# Patient Record
Sex: MALE | Race: BLACK OR AFRICAN AMERICAN | Employment: OTHER | URBAN - METROPOLITAN AREA
[De-identification: names, ages, dates, MRNs, and addresses within clinical notes are randomized per-mention and may not be internally consistent; named-entity substitution may affect disease eponyms.]

---

## 2018-08-02 ENCOUNTER — HOSPITAL ENCOUNTER (INPATIENT)
Age: 83
LOS: 12 days | Discharge: HOSPICE | DRG: 853 | End: 2018-08-14
Attending: EMERGENCY MEDICINE | Admitting: FAMILY MEDICINE
Payer: COMMERCIAL

## 2018-08-02 ENCOUNTER — APPOINTMENT (OUTPATIENT)
Dept: CT IMAGING | Age: 83
DRG: 853 | End: 2018-08-02
Payer: COMMERCIAL

## 2018-08-02 ENCOUNTER — APPOINTMENT (OUTPATIENT)
Dept: GENERAL RADIOLOGY | Age: 83
DRG: 853 | End: 2018-08-02
Payer: COMMERCIAL

## 2018-08-02 DIAGNOSIS — R77.8 ELEVATED TROPONIN: ICD-10-CM

## 2018-08-02 DIAGNOSIS — N17.9 ACUTE RENAL FAILURE, UNSPECIFIED ACUTE RENAL FAILURE TYPE (HCC): ICD-10-CM

## 2018-08-02 DIAGNOSIS — R41.82 ALTERED MENTAL STATUS, UNSPECIFIED ALTERED MENTAL STATUS TYPE: ICD-10-CM

## 2018-08-02 DIAGNOSIS — N39.0 COMPLICATED UTI (URINARY TRACT INFECTION): ICD-10-CM

## 2018-08-02 DIAGNOSIS — A41.9 SEPSIS, DUE TO UNSPECIFIED ORGANISM: Primary | ICD-10-CM

## 2018-08-02 PROBLEM — R82.81 PYURIA: Status: ACTIVE | Noted: 2018-08-02

## 2018-08-02 PROBLEM — E87.20 LACTIC ACIDOSIS: Status: ACTIVE | Noted: 2018-08-02

## 2018-08-02 PROBLEM — R65.21 SEPTIC SHOCK (HCC): Status: ACTIVE | Noted: 2018-08-02

## 2018-08-02 PROBLEM — H40.10X0 OPEN-ANGLE GLAUCOMA: Status: ACTIVE | Noted: 2018-08-02

## 2018-08-02 LAB
A/G RATIO: 0.7 (ref 1.1–2.2)
ALBUMIN SERPL-MCNC: 3.1 G/DL (ref 3.4–5)
ALP BLD-CCNC: 192 U/L (ref 40–129)
ALT SERPL-CCNC: 30 U/L (ref 10–40)
AMORPHOUS: ABNORMAL /HPF
ANION GAP SERPL CALCULATED.3IONS-SCNC: 15 MMOL/L (ref 3–16)
AST SERPL-CCNC: 49 U/L (ref 15–37)
BACTERIA: ABNORMAL /HPF
BANDED NEUTROPHILS RELATIVE PERCENT: 22 % (ref 0–7)
BASOPHILS ABSOLUTE: 0 K/UL (ref 0–0.2)
BASOPHILS RELATIVE PERCENT: 0 %
BILIRUB SERPL-MCNC: 0.8 MG/DL (ref 0–1)
BILIRUBIN URINE: NEGATIVE
BLOOD, URINE: ABNORMAL
BUN BLDV-MCNC: 22 MG/DL (ref 7–20)
CALCIUM SERPL-MCNC: 8.6 MG/DL (ref 8.3–10.6)
CHLORIDE BLD-SCNC: 98 MMOL/L (ref 99–110)
CLARITY: CLEAR
CO2: 23 MMOL/L (ref 21–32)
COLOR: YELLOW
CREAT SERPL-MCNC: 2.4 MG/DL (ref 0.8–1.3)
EKG ATRIAL RATE: 94 BPM
EKG DIAGNOSIS: NORMAL
EKG Q-T INTERVAL: 344 MS
EKG QRS DURATION: 84 MS
EKG QTC CALCULATION (BAZETT): 425 MS
EKG R AXIS: -53 DEGREES
EKG T AXIS: 132 DEGREES
EKG VENTRICULAR RATE: 92 BPM
EOSINOPHILS ABSOLUTE: 0 K/UL (ref 0–0.6)
EOSINOPHILS RELATIVE PERCENT: 0 %
EPITHELIAL CELLS, UA: ABNORMAL /HPF
GFR AFRICAN AMERICAN: 31
GFR NON-AFRICAN AMERICAN: 26
GLOBULIN: 4.3 G/DL
GLUCOSE BLD-MCNC: 80 MG/DL (ref 70–99)
GLUCOSE URINE: NEGATIVE MG/DL
HCT VFR BLD CALC: 42.7 % (ref 40.5–52.5)
HEMOGLOBIN: 14.1 G/DL (ref 13.5–17.5)
KETONES, URINE: NEGATIVE MG/DL
LACTIC ACID, SEPSIS: 3.3 MMOL/L (ref 0.4–1.9)
LACTIC ACID, SEPSIS: 5 MMOL/L (ref 0.4–1.9)
LEUKOCYTE ESTERASE, URINE: ABNORMAL
LYMPHOCYTES ABSOLUTE: 0.3 K/UL (ref 1–5.1)
LYMPHOCYTES RELATIVE PERCENT: 10 %
MCH RBC QN AUTO: 30.9 PG (ref 26–34)
MCHC RBC AUTO-ENTMCNC: 33.1 G/DL (ref 31–36)
MCV RBC AUTO: 93.3 FL (ref 80–100)
MICROSCOPIC EXAMINATION: YES
MONOCYTES ABSOLUTE: 0.1 K/UL (ref 0–1.3)
MONOCYTES RELATIVE PERCENT: 4 %
NEUTROPHILS ABSOLUTE: 2.8 K/UL (ref 1.7–7.7)
NEUTROPHILS RELATIVE PERCENT: 64 %
NITRITE, URINE: POSITIVE
PDW BLD-RTO: 13.7 % (ref 12.4–15.4)
PH UA: 6
PLATELET # BLD: 224 K/UL (ref 135–450)
PLATELET SLIDE REVIEW: ADEQUATE
PMV BLD AUTO: 7.7 FL (ref 5–10.5)
POTASSIUM SERPL-SCNC: 3.3 MMOL/L (ref 3.5–5.1)
PROTEIN UA: 100 MG/DL
RBC # BLD: 4.57 M/UL (ref 4.2–5.9)
RBC UA: ABNORMAL /HPF (ref 0–2)
SLIDE REVIEW: ABNORMAL
SODIUM BLD-SCNC: 136 MMOL/L (ref 136–145)
SPECIFIC GRAVITY UA: 1.01
TOTAL PROTEIN: 7.4 G/DL (ref 6.4–8.2)
TROPONIN: 0.08 NG/ML
URINE TYPE: ABNORMAL
UROBILINOGEN, URINE: 4 E.U./DL
WBC # BLD: 3.3 K/UL (ref 4–11)
WBC UA: >100 /HPF (ref 0–5)

## 2018-08-02 PROCEDURE — 80053 COMPREHEN METABOLIC PANEL: CPT

## 2018-08-02 PROCEDURE — 2500000003 HC RX 250 WO HCPCS: Performed by: EMERGENCY MEDICINE

## 2018-08-02 PROCEDURE — 87086 URINE CULTURE/COLONY COUNT: CPT

## 2018-08-02 PROCEDURE — 2580000003 HC RX 258: Performed by: EMERGENCY MEDICINE

## 2018-08-02 PROCEDURE — 96375 TX/PRO/DX INJ NEW DRUG ADDON: CPT

## 2018-08-02 PROCEDURE — 96366 THER/PROPH/DIAG IV INF ADDON: CPT

## 2018-08-02 PROCEDURE — 71045 X-RAY EXAM CHEST 1 VIEW: CPT

## 2018-08-02 PROCEDURE — 83605 ASSAY OF LACTIC ACID: CPT

## 2018-08-02 PROCEDURE — 84484 ASSAY OF TROPONIN QUANT: CPT

## 2018-08-02 PROCEDURE — 87077 CULTURE AEROBIC IDENTIFY: CPT

## 2018-08-02 PROCEDURE — 2580000003 HC RX 258: Performed by: FAMILY MEDICINE

## 2018-08-02 PROCEDURE — 96365 THER/PROPH/DIAG IV INF INIT: CPT

## 2018-08-02 PROCEDURE — 81001 URINALYSIS AUTO W/SCOPE: CPT

## 2018-08-02 PROCEDURE — 93005 ELECTROCARDIOGRAM TRACING: CPT | Performed by: EMERGENCY MEDICINE

## 2018-08-02 PROCEDURE — 87186 SC STD MICRODIL/AGAR DIL: CPT

## 2018-08-02 PROCEDURE — 2580000003 HC RX 258: Performed by: INTERNAL MEDICINE

## 2018-08-02 PROCEDURE — 6370000000 HC RX 637 (ALT 250 FOR IP): Performed by: FAMILY MEDICINE

## 2018-08-02 PROCEDURE — 85025 COMPLETE CBC W/AUTO DIFF WBC: CPT

## 2018-08-02 PROCEDURE — 2500000003 HC RX 250 WO HCPCS: Performed by: INTERNAL MEDICINE

## 2018-08-02 PROCEDURE — 6370000000 HC RX 637 (ALT 250 FOR IP): Performed by: EMERGENCY MEDICINE

## 2018-08-02 PROCEDURE — 4500000025 HC ED LEVEL 5 PROCEDURE

## 2018-08-02 PROCEDURE — 87184 SC STD DISK METHOD PER PLATE: CPT

## 2018-08-02 PROCEDURE — 87801 DETECT AGNT MULT DNA AMPLI: CPT

## 2018-08-02 PROCEDURE — 93010 ELECTROCARDIOGRAM REPORT: CPT | Performed by: INTERNAL MEDICINE

## 2018-08-02 PROCEDURE — 6360000002 HC RX W HCPCS: Performed by: EMERGENCY MEDICINE

## 2018-08-02 PROCEDURE — 99285 EMERGENCY DEPT VISIT HI MDM: CPT

## 2018-08-02 PROCEDURE — 87040 BLOOD CULTURE FOR BACTERIA: CPT

## 2018-08-02 PROCEDURE — 2000000000 HC ICU R&B

## 2018-08-02 PROCEDURE — 74176 CT ABD & PELVIS W/O CONTRAST: CPT

## 2018-08-02 PROCEDURE — 96361 HYDRATE IV INFUSION ADD-ON: CPT

## 2018-08-02 PROCEDURE — 99291 CRITICAL CARE FIRST HOUR: CPT | Performed by: INTERNAL MEDICINE

## 2018-08-02 PROCEDURE — 70450 CT HEAD/BRAIN W/O DYE: CPT

## 2018-08-02 PROCEDURE — 96367 TX/PROPH/DG ADDL SEQ IV INF: CPT

## 2018-08-02 PROCEDURE — 87449 NOS EACH ORGANISM AG IA: CPT

## 2018-08-02 RX ORDER — SODIUM CHLORIDE 9 MG/ML
INJECTION, SOLUTION INTRAVENOUS CONTINUOUS
Status: DISCONTINUED | OUTPATIENT
Start: 2018-08-02 | End: 2018-08-03

## 2018-08-02 RX ORDER — AMLODIPINE BESYLATE 5 MG/1
5 TABLET ORAL DAILY
Status: CANCELLED | OUTPATIENT
Start: 2018-08-02

## 2018-08-02 RX ORDER — DOCUSATE SODIUM 100 MG/1
100 CAPSULE, LIQUID FILLED ORAL 2 TIMES DAILY
Status: DISCONTINUED | OUTPATIENT
Start: 2018-08-02 | End: 2018-08-07

## 2018-08-02 RX ORDER — TAMSULOSIN HYDROCHLORIDE 0.4 MG/1
0.4 CAPSULE ORAL NIGHTLY
Status: DISCONTINUED | OUTPATIENT
Start: 2018-08-02 | End: 2018-08-02

## 2018-08-02 RX ORDER — 0.9 % SODIUM CHLORIDE 0.9 %
1151 INTRAVENOUS SOLUTION INTRAVENOUS ONCE
Status: COMPLETED | OUTPATIENT
Start: 2018-08-02 | End: 2018-08-02

## 2018-08-02 RX ORDER — ASCORBIC ACID 500 MG
500 TABLET ORAL DAILY
Status: DISCONTINUED | OUTPATIENT
Start: 2018-08-02 | End: 2018-08-07

## 2018-08-02 RX ORDER — SODIUM CHLORIDE 9 MG/ML
1000 INJECTION, SOLUTION INTRAVENOUS CONTINUOUS
Status: DISCONTINUED | OUTPATIENT
Start: 2018-08-02 | End: 2018-08-02 | Stop reason: SDUPTHER

## 2018-08-02 RX ORDER — DORZOLAMIDE HYDROCHLORIDE AND TIMOLOL MALEATE 20; 5 MG/ML; MG/ML
1 SOLUTION/ DROPS OPHTHALMIC 2 TIMES DAILY
Status: DISCONTINUED | OUTPATIENT
Start: 2018-08-02 | End: 2018-08-14 | Stop reason: HOSPADM

## 2018-08-02 RX ORDER — SODIUM CHLORIDE 0.9 % (FLUSH) 0.9 %
10 SYRINGE (ML) INJECTION PRN
Status: DISCONTINUED | OUTPATIENT
Start: 2018-08-02 | End: 2018-08-14 | Stop reason: HOSPADM

## 2018-08-02 RX ORDER — ONDANSETRON 2 MG/ML
4 INJECTION INTRAMUSCULAR; INTRAVENOUS EVERY 30 MIN PRN
Status: DISCONTINUED | OUTPATIENT
Start: 2018-08-02 | End: 2018-08-02 | Stop reason: SDUPTHER

## 2018-08-02 RX ORDER — BRIMONIDINE TARTRATE 2 MG/ML
1 SOLUTION/ DROPS OPHTHALMIC 2 TIMES DAILY
Status: DISCONTINUED | OUTPATIENT
Start: 2018-08-02 | End: 2018-08-14 | Stop reason: HOSPADM

## 2018-08-02 RX ORDER — 0.9 % SODIUM CHLORIDE 0.9 %
1000 INTRAVENOUS SOLUTION INTRAVENOUS ONCE
Status: COMPLETED | OUTPATIENT
Start: 2018-08-02 | End: 2018-08-02

## 2018-08-02 RX ORDER — HYDRALAZINE HYDROCHLORIDE 25 MG/1
25 TABLET, FILM COATED ORAL 3 TIMES DAILY
Status: CANCELLED | OUTPATIENT
Start: 2018-08-02

## 2018-08-02 RX ORDER — SODIUM CHLORIDE 0.9 % (FLUSH) 0.9 %
10 SYRINGE (ML) INJECTION EVERY 12 HOURS SCHEDULED
Status: DISCONTINUED | OUTPATIENT
Start: 2018-08-02 | End: 2018-08-14 | Stop reason: HOSPADM

## 2018-08-02 RX ORDER — ONDANSETRON 2 MG/ML
4 INJECTION INTRAMUSCULAR; INTRAVENOUS EVERY 6 HOURS PRN
Status: DISCONTINUED | OUTPATIENT
Start: 2018-08-02 | End: 2018-08-14 | Stop reason: HOSPADM

## 2018-08-02 RX ORDER — SENNA PLUS 8.6 MG/1
1 TABLET ORAL 2 TIMES DAILY
Status: DISCONTINUED | OUTPATIENT
Start: 2018-08-02 | End: 2018-08-07

## 2018-08-02 RX ORDER — SODIUM CHLORIDE 0.9 % (FLUSH) 0.9 %
10 SYRINGE (ML) INJECTION PRN
Status: DISCONTINUED | OUTPATIENT
Start: 2018-08-02 | End: 2018-08-02 | Stop reason: SDUPTHER

## 2018-08-02 RX ORDER — SELENIUM 50 MCG
1 TABLET ORAL 2 TIMES DAILY
Status: DISCONTINUED | OUTPATIENT
Start: 2018-08-02 | End: 2018-08-02 | Stop reason: CLARIF

## 2018-08-02 RX ORDER — PANTOPRAZOLE SODIUM 40 MG/1
40 TABLET, DELAYED RELEASE ORAL
Status: DISCONTINUED | OUTPATIENT
Start: 2018-08-03 | End: 2018-08-06

## 2018-08-02 RX ORDER — ACETAMINOPHEN 650 MG/1
650 SUPPOSITORY RECTAL ONCE
Status: COMPLETED | OUTPATIENT
Start: 2018-08-02 | End: 2018-08-02

## 2018-08-02 RX ORDER — LACTOBACILLUS RHAMNOSUS GG 10B CELL
1 CAPSULE ORAL 2 TIMES DAILY WITH MEALS
Status: DISCONTINUED | OUTPATIENT
Start: 2018-08-03 | End: 2018-08-14 | Stop reason: HOSPADM

## 2018-08-02 RX ORDER — LATANOPROST 50 UG/ML
1 SOLUTION/ DROPS OPHTHALMIC NIGHTLY
Status: DISCONTINUED | OUTPATIENT
Start: 2018-08-02 | End: 2018-08-14 | Stop reason: HOSPADM

## 2018-08-02 RX ORDER — FERROUS SULFATE 325(65) MG
325 TABLET ORAL
Status: DISCONTINUED | OUTPATIENT
Start: 2018-08-03 | End: 2018-08-06

## 2018-08-02 RX ADMIN — SODIUM CHLORIDE 1000 ML: 9 INJECTION, SOLUTION INTRAVENOUS at 15:06

## 2018-08-02 RX ADMIN — SODIUM CHLORIDE: 9 INJECTION, SOLUTION INTRAVENOUS at 18:29

## 2018-08-02 RX ADMIN — BRIMONIDINE TARTRATE 1 DROP: 2 SOLUTION OPHTHALMIC at 22:55

## 2018-08-02 RX ADMIN — CEFTRIAXONE SODIUM 1 G: 1 INJECTION, POWDER, FOR SOLUTION INTRAMUSCULAR; INTRAVENOUS at 13:35

## 2018-08-02 RX ADMIN — SODIUM CHLORIDE: 9 INJECTION, SOLUTION INTRAVENOUS at 20:02

## 2018-08-02 RX ADMIN — VASOPRESSIN 0.04 UNITS/MIN: 20 INJECTION INTRAVENOUS at 22:45

## 2018-08-02 RX ADMIN — SODIUM CHLORIDE, PRESERVATIVE FREE 10 ML: 5 INJECTION INTRAVENOUS at 20:14

## 2018-08-02 RX ADMIN — APIXABAN 5 MG: 5 TABLET, FILM COATED ORAL at 20:14

## 2018-08-02 RX ADMIN — AZITHROMYCIN MONOHYDRATE 500 MG: 500 INJECTION, POWDER, LYOPHILIZED, FOR SOLUTION INTRAVENOUS at 14:18

## 2018-08-02 RX ADMIN — DORZOLAMIDE HYDROCHLORIDE AND TIMOLOL MALEATE 1 DROP: 20; 5 SOLUTION/ DROPS OPHTHALMIC at 22:55

## 2018-08-02 RX ADMIN — DOCUSATE SODIUM 100 MG: 100 CAPSULE, LIQUID FILLED ORAL at 20:17

## 2018-08-02 RX ADMIN — NOREPINEPHRINE BITARTRATE 2 MCG/MIN: 1 INJECTION INTRAVENOUS at 17:41

## 2018-08-02 RX ADMIN — TAMSULOSIN HYDROCHLORIDE 0.4 MG: 0.4 CAPSULE ORAL at 20:14

## 2018-08-02 RX ADMIN — ACETAMINOPHEN 650 MG: 650 SUPPOSITORY RECTAL at 11:50

## 2018-08-02 RX ADMIN — SODIUM CHLORIDE 1151 ML: 9 INJECTION, SOLUTION INTRAVENOUS at 13:34

## 2018-08-02 RX ADMIN — LATANOPROST 1 DROP: 50 SOLUTION OPHTHALMIC at 23:51

## 2018-08-02 RX ADMIN — ONDANSETRON HYDROCHLORIDE 4 MG: 2 INJECTION, SOLUTION INTRAMUSCULAR; INTRAVENOUS at 11:49

## 2018-08-02 RX ADMIN — SODIUM CHLORIDE 1000 ML: 9 INJECTION, SOLUTION INTRAVENOUS at 11:49

## 2018-08-02 ASSESSMENT — PAIN SCALES - GENERAL: PAINLEVEL_OUTOF10: 0

## 2018-08-02 NOTE — H&P
Hospital Medicine History & Physical      PCP: Tatyana Mcintyre    Date of Admission: 8/2/2018    Date of Service: Pt seen/examined on 8/2/18 and Admitted to Inpatient with expected LOS greater than two midnights due to medical therapy. Chief Complaint:  Confused , lethargic       History Of Present Illness:      80 y.o. male with PMH of DVt, chr diastolic chf , chronic supra pubic catheter for urethral stricture presents from home with c/o confusion ,  Lethargy , nausea with non bloody emesis since this am , pt had his suprapubic catheter changed yesterday   Pt is confused, could not contribute much to history     Past Medical History:          Diagnosis Date    Urinary retention     UTI (urinary tract infection)        Past Surgical History:      History reviewed. No pertinent surgical history. Medications Prior to Admission:      Prior to Admission medications    Medication Sig Start Date End Date Taking?  Authorizing Provider   apixaban (ELIQUIS) 5 MG TABS tablet Take 2 tablets by mouth 2 times daily 6/18/15   Pratima Castillo MD   apixaban [facility]) 5 MG TABS tablet Take 1 tablet by mouth 2 times daily 6/24/15   Pratima Castillo MD   ferrous sulfate 325 (65 FE) MG EC tablet Take 1 tablet by mouth daily (with breakfast) 6/18/15   Pratima Castillo MD   latanoprost (XALATAN) 0.005 % ophthalmic solution   Place 1 drop into both eyes nightly     Historical Provider, MD   brimonidine (ALPHAGAN) 0.2 % ophthalmic solution Place 1 drop into both eyes 2 times daily    Historical Provider, MD   omeprazole (PRILOSEC) 20 MG capsule Take 20 mg by mouth daily    Historical Provider, MD   furosemide (LASIX) 20 MG tablet Take 20 mg by mouth daily    Historical Provider, MD   tamsulosin (FLOMAX) 0.4 MG capsule Take 0.4 mg by mouth nightly    Historical Provider, MD   potassium chloride SA (K-DUR;KLOR-CON M) 20 MEQ tablet Take 20 mEq by mouth 2 times daily    Historical Provider, MD   docusate sodium (COLACE) 100 MG 23.33 kg/m²     General appearance:  Lethargic   HEENT:  Normal cephalic, atraumatic without obvious deformity. Pupils equal, round, and reactive to light. Extra ocular muscles intact. Conjunctivae/corneas clear. Neck: Supple, with full range of motion. No jugular venous distention. Trachea midline. Respiratory:  Normal respiratory effort. Clear to auscultation, bilaterally without Rales/Wheezes/Rhonchi. Cardiovascular:  Regular rate and rhythm with normal S1/S2 without murmurs, rubs or gallops. Abdomen: Soft, non-tender, non-distended with normal bowel sounds. Musculoskeletal:  No clubbing, cyanosis or edema bilaterally. Full range of motion without deformity. Skin: Skin color, texture, turgor normal.  No rashes or lesions. Neurologic:   grossly non-focal.  Psychiatric:  Somnolent , opens eyes to voice   Capillary Refill: Brisk,< 3 seconds   Peripheral Pulses: +2 palpable, equal bilaterally       Labs:     Recent Labs      08/02/18   1137   WBC  3.3*   HGB  14.1   HCT  42.7   PLT  224     Recent Labs      08/02/18   1137   NA  136   K  3.3*   CL  98*   CO2  23   BUN  22*   CREATININE  2.4*   CALCIUM  8.6     Recent Labs      08/02/18   1137   AST  49*   ALT  30   BILITOT  0.8   ALKPHOS  192*     No results for input(s): INR in the last 72 hours. Recent Labs      08/02/18   1137   TROPONINI  0.08*       Urinalysis:      Lab Results   Component Value Date    NITRU POSITIVE 08/02/2018    WBCUA >100 08/02/2018    BACTERIA 1+ 08/02/2018    RBCUA  08/02/2018    BLOODU MODERATE 08/02/2018    SPECGRAV 1.010 08/02/2018    GLUCOSEU Negative 08/02/2018       Radiology:     CXR: I have reviewed the CXR with the following interpretation: ? infiltrate  EKG:  I have reviewed the EKG with the following interpretation:no acute changes     CT HEAD WO CONTRAST   Final Result   No acute intracranial abnormality.          XR CHEST PORTABLE   Preliminary Result   Curvilinear opacity within the medial right lung base, which could represent   atelectasis, pneumonia, or aspiration. ASSESSMENT:    There are no active hospital problems to display for this patient. PLAN:    Septic shock   likely due to UTI, complicated, related to chronic suprapubic catheter   Poss Pneumonia, poss gram pos, aspiration risk   ARF     Pt presents with fever, encephalopathy, hypotension , wbc 3.3, elevated lactic acid of 5 , pt given appropriate fluid bolus , still hypotensive  ,central line being placed in ED,   admit to ICU  Cont IVF, empiric abx rocephin , azithro , hold antihtn meds   Further w/u CT abd given recent suprapubic catheter exchange. SLP to eval for dysphagia     ARF  Creat up to 2.4 from a baseline of 1   Cont IVF, f/u with rpt BMP     H/o DVT   Cont eliquis     Chr diastolic CHF   Hold lasix     DVT Prophylaxis: eliquis   Diet:    Code Status:DNR CCA     PT/OT Eval Status:     Dispo - 3-4 days        Chace Salinas MD    Thank you [facility] for the opportunity to be involved in this patient's care. If you have any questions or concerns please feel free to contact me at [phone].

## 2018-08-02 NOTE — ED PROVIDER NOTES
Emergency Department Provider Note  Location: [address] [place] [address]  ED  8/2/2018     Patient Identification  Delta Brown is a 80 y.o. male    Chief Complaint  Altered Mental Status (Pt incontinent of stool. Pt has suprapubic catheter.  )      Mode of Arrival  EMS    HPI  (History provided by Wife)  This is a 80 y.o. male with a PMH significant for ureteral stricture s/p suprapubic catheter placement presented today for AMS. Wife stated that patient was fine yesterday getting a suprapubic catheter changed out. Today, patient started complaining of nausea and shortly after became altered. He vomited a couple times and then became more altered to the point that he was no longer responsive. Urine has appeared cloudy to wife but no antibiotics recently. Patient is altered and unable to provide any further history. ROS  Review of Systems   Unable to perform ROS: Mental status change        I have reviewed the following nursing documentation:  Allergies: No Known Allergies    Past medical history:  has no past medical history on file. Past surgical history:  has no past surgical history on file. Home medications:   Prior to Admission medications    Medication Sig Start Date End Date Taking?  Authorizing Provider   apixaban (ELIQUIS) 5 MG TABS tablet Take 2 tablets by mouth 2 times daily 6/18/15   Jocelyne Ham MD   apixaban [facility]) 5 MG TABS tablet Take 1 tablet by mouth 2 times daily 6/24/15   Jocelyne Ham MD   ferrous sulfate 325 (65 FE) MG EC tablet Take 1 tablet by mouth daily (with breakfast) 6/18/15   Jocelyne Ham MD   latanoprost (XALATAN) 0.005 % ophthalmic solution   Place 1 drop into both eyes nightly     Historical Provider, MD   brimonidine (ALPHAGAN) 0.2 % ophthalmic solution Place 1 drop into both eyes 2 times daily    Historical Provider, MD   omeprazole (PRILOSEC) 20 MG capsule Take 20 mg by mouth daily    Historical Provider, MD   furosemide (LASIX) 20 MG tablet Take 20 mg by mouth daily    Historical Provider, MD   tamsulosin (FLOMAX) 0.4 MG capsule Take 0.4 mg by mouth nightly    Historical Provider, MD   potassium chloride SA (K-DUR;KLOR-CON M) 20 MEQ tablet Take 20 mEq by mouth 2 times daily    Historical Provider, MD   docusate sodium (COLACE) 100 MG capsule Take 100 mg by mouth 2 times daily    Historical Provider, MD   senna (SENOKOT) 8.6 MG tablet Take 1 tablet by mouth 2 times daily    Historical Provider, MD   hydrALAZINE (APRESOLINE) 25 MG tablet Take 25 mg by mouth 3 times daily    Historical Provider, MD   dorzolamide-timolol (COSOPT) 22.3-6.8 MG/ML ophthalmic solution 1 drop 2 times daily    Historical Provider, MD   amLODIPine (NORVASC) 5 MG tablet Take 5 mg by mouth daily Hold if SBP <110    Historical Provider, MD   bisacodyl (BISAC-EVAC) 10 MG suppository Place 10 mg rectally daily as needed for Constipation    Historical Provider, MD   Lactobacillus (ACIDOPHILUS) CAPS capsule Take 1 capsule by mouth 2 times daily    Historical Provider, MD   Multiple Vitamins-Minerals (STRESS FORMULA 500/ZINC) TABS Take 1 tablet by mouth daily    Historical Provider, MD   menthol-zinc oxide (RISAMINE) 0.44-20.625 % OINT ointment Apply topically daily Max 30 ml per day. Historical Provider, MD   silver sulfADIAZINE (SILVADENE) 1 % cream Apply topically daily    Historical Provider, MD   ascorbic acid (VITAMIN C) 500 MG tablet Take 500 mg by mouth daily    Historical Provider, MD   acetaminophen (TYLENOL) 500 MG tablet Take 500 mg by mouth every 6 hours as needed for Pain    Historical Provider, MD       Social history:  reports that he has never smoked. He has never used smokeless tobacco. He reports that he does not drink alcohol or use drugs. Family history:  History reviewed. No pertinent family history.     Exam  ED Triage Vitals [08/02/18 1126]   BP Temp Temp Source Pulse Resp SpO2 Height Weight   (!) 83/45 103.7 °F (39.8 °C) Rectal 90 23 (!) 89 % 5' 9\" (1.753 m) 158 lb (71.7 kg)   Physical Exam   Constitutional: He appears well-developed and well-nourished. He appears lethargic. He appears ill. HENT:   Head: Normocephalic and atraumatic. Eyes: Conjunctivae are normal. Right pupil is reactive. Left pupil is reactive. Neck: Neck rigidity present. Decreased range of motion present. No tracheal deviation present. Cardiovascular: Normal rate and regular rhythm. Pulmonary/Chest: No stridor. Tachypnea noted. No respiratory distress. He has decreased breath sounds. Abdominal: Soft. He exhibits no distension and no mass. There is no guarding. Musculoskeletal: He exhibits no edema or deformity. Lymphadenopathy:     He has no cervical adenopathy. Neurological: He appears lethargic. He is disoriented. He displays no tremor. He exhibits abnormal muscle tone (Increased tone throughout). He displays no seizure activity. Skin: Skin is warm and dry. No rash noted. He is not diaphoretic. Nursing note and vitals reviewed. MDM/ED Course  Delay in obtaining urine; patient had no urine in his catheter/bag for 3 hours    EKG  The Ekg interpreted by me in the absence of a cardiologist shows. Very wavy baseline, likely junctional rhythm with a rate of 92  Axis is   Left axis deviation  QTc is  within an acceptable range  No specific ST-T wave changes appreciated. No evidence of acute ischemia. Compare to prior EKG dated 6/9/15, junctional rhythm today and left axis deviation new      Radiology  Ct Head Wo Contrast    Result Date: 8/2/2018  EXAMINATION: CT OF THE HEAD WITHOUT CONTRAST  8/2/2018 12:51 pm TECHNIQUE: CT of the head was performed without the administration of intravenous contrast. Dose modulation, iterative reconstruction, and/or weight based adjustment of the mA/kV was utilized to reduce the radiation dose to as low as reasonably achievable.  COMPARISON: CT 06/09/2015 HISTORY: ORDERING SYSTEM PROVIDED HISTORY: [facility] TECHNOLOGIST PROVIDED HISTORY: Has a \"code stroke\" or alternatives were discussed. A written consent was obtained. The skin over the right internal jugular vein was cleansed with ChloraPrep and draped in the usual sterile fashion. After infiltration of 5mL 1% lidocaine with epinephrine for anesthesia, an 18-gauge needle was inserted under ultrasound guidance while aspirating with a 10mL syringe. After a flash of dark venous blood returned, the right internal jugular vein was accessed with a sterile guidewire through the needle. The needle was then removed over the guidwire. A small skin incision was made with a #11 scalpel over the guidewire. An 8.5 Pakistani subcutaneous dilator was then inserted and withdrawn. Using Seldinger technique, the normal saline pre-flushed 7-Pakistani triple lumen catheter was inserted over the guidewire and each port accessed for free aspiration and flushed with saline to confirm placement within the venous lumen. The catheter was then secured to the skin using sutures to prevent dislodgement. The area was then recleansed with ChloraPrep and dressed with an antibiotic-infused disc and a sterile occlusive dressing. Patient tolerated procedure well without immediate complications. Post-procedure x-ray ordered and interpreted by myself showed no PTX; TLC appears to be in good position. I spoke with Dr. Nishant Hill. hospitalist. We thoroughly discussed the history, physical exam, laboratory and imaging studies, as well as, emergency department course. Based upon that discussion, we've decided to admit Bree Smith for further observation and evaluation of Marita Leung's mental status change. As I have deemed necessary from their history, physical and studies, I have considered and evaluated Bree Smith for the following diagnoses:  DIABETES, INTRACRANIAL HEMORRHAGE, MENINGITIS, SEPSIS SUBARACHNOID HEMORRHAGE, SUBDURAL HEMATOMA, & STROKE. FINAL IMPRESSION  1. Sepsis, due to unspecified organism ([facility] [address]    2.  Altered mental status, unspecified altered mental status type    3. Acute renal failure, unspecified acute renal failure type (HCC)    4. Elevated troponin    5. Complicated UTI (urinary tract infection)          ED Medication Orders     Start Ordered     Status Ordering Provider    08/02/18 1600 08/02/18 1552  norepinephrine (LEVOPHED) 16 mg in dextrose 5 % 250 mL infusion  CONTINUOUS      Last MAR action:  New Bag - by Lisy Black on 08/02/18 at [facility] [address], [facility]    08/02/18 1515 08/02/18 1503  0.9 % sodium chloride infusion  CONTINUOUS      Last MAR action:  New Bag - by Olayinka Aragon on 08/02/18 at [place] [address], [facility]    08/02/18 1300 08/02/18 1258  cefTRIAXone (ROCEPHIN) 1 g IVPB in 50 mL D5W minibag  ONCE      Last MAR action:  Stopped - by Olayinka Aragon on 08/02/18 at [address] [facility], [facility]    08/02/18 1300 08/02/18 1258  azithromycin (ZITHROMAX) 500 mg in D5W 250ml addavial  ONCE      Last MAR action:  Stopped - by Olayinka Aragon on 08/02/18 at [address] [facility], [facility]    08/02/18 1230 08/02/18 1230  0.9 % sodium chloride bolus  ONCE      Last MAR action:  Stopped - by Olayinka Aragon on 08/02/18 at [place] [address], [facility]    08/02/18 1130 08/02/18 1117  acetaminophen (TYLENOL) suppository 650 mg  ONCE      Last MAR action:  Given - by Olayinka Aragon on 08/02/18 at [address] [facility]    08/02/18 1115 08/02/18 1112  0.9 % sodium chloride bolus  ONCE      Last MAR action:  Stopped - by Olayinka Aragon on 08/02/18 at [facility] [address], [facility]    08/02/18 1114 08/02/18 1114  sodium chloride flush 0.9 % injection 10 mL  PRN      Acknowledged Ney Conley, [facility]    08/02/18 1112 08/02/18 1112  ondansetron (ZOFRAN) injection 4 mg  EVERY 30 MIN PRN      Last MAR action:  Given - by Olayinka Aragon on 08/02/18 at [facility]          Disposition:  Admit to CCU/ICU in critical condition. The total Critical Care time is 50 minutes which excludes separately billable procedures.       This chart was generated in part by using [employer] and may contain errors related to that system including errors in grammar, punctuation, and spelling, as well as words and phrases that may be inappropriate. If there are any questions or concerns please feel free to contact the dictating provider for clarification.      Alejandro Avila MD  [address] [place] [address] Stewart Caal MD  08/02/18 9470

## 2018-08-02 NOTE — ED NOTES
Bedside report given to [facility] from A-2. [facility] confirmed tele box [phone].       Wesly Huerta RN  08/02/18 3275

## 2018-08-02 NOTE — ED NOTES
Portable chest xray in progress to check central line placement.        Sandra Yan RN  08/02/18 2439

## 2018-08-02 NOTE — ED NOTES
Pt incontinent of stool. Perineal care provided. Pt cristian well. Suprapubic catheter bag changed.        Matteo Palmer RN  08/02/18 6743

## 2018-08-02 NOTE — ED NOTES
Bedside report given to [facility] RN from ICU.        Ananth Carrillo RN  08/02/18 0479

## 2018-08-03 ENCOUNTER — ANESTHESIA EVENT (OUTPATIENT)
Dept: OPERATING ROOM | Age: 83
DRG: 853 | End: 2018-08-03
Payer: COMMERCIAL

## 2018-08-03 ENCOUNTER — APPOINTMENT (OUTPATIENT)
Dept: GENERAL RADIOLOGY | Age: 83
DRG: 853 | End: 2018-08-03
Payer: COMMERCIAL

## 2018-08-03 ENCOUNTER — ANESTHESIA (OUTPATIENT)
Dept: OPERATING ROOM | Age: 83
DRG: 853 | End: 2018-08-03
Payer: COMMERCIAL

## 2018-08-03 VITALS
TEMPERATURE: 97.5 F | OXYGEN SATURATION: 100 % | SYSTOLIC BLOOD PRESSURE: 119 MMHG | DIASTOLIC BLOOD PRESSURE: 69 MMHG | RESPIRATION RATE: 15 BRPM

## 2018-08-03 PROBLEM — K56.41 FECAL IMPACTION (HCC): Status: ACTIVE | Noted: 2018-08-03

## 2018-08-03 PROBLEM — R91.8 PULMONARY INFILTRATES: Status: ACTIVE | Noted: 2018-08-03

## 2018-08-03 PROBLEM — K86.2 PANCREATIC CYST: Status: ACTIVE | Noted: 2018-08-03

## 2018-08-03 PROBLEM — R78.81 BACTEREMIA DUE TO PSEUDOMONAS: Status: ACTIVE | Noted: 2018-08-03

## 2018-08-03 PROBLEM — J95.89 ACUTE RESPIRATORY INSUFFICIENCY, POSTOPERATIVE: Status: ACTIVE | Noted: 2018-08-03

## 2018-08-03 PROBLEM — B96.5 BACTEREMIA DUE TO PSEUDOMONAS: Status: ACTIVE | Noted: 2018-08-03

## 2018-08-03 PROBLEM — N20.0 NEPHROLITHIASIS: Status: ACTIVE | Noted: 2018-08-03

## 2018-08-03 PROBLEM — N13.2 HYDRONEPHROSIS WITH URINARY OBSTRUCTION DUE TO RENAL CALCULUS: Status: ACTIVE | Noted: 2018-08-03

## 2018-08-03 LAB
ALBUMIN SERPL-MCNC: 2.1 G/DL (ref 3.4–5)
ALBUMIN SERPL-MCNC: 2.1 G/DL (ref 3.4–5)
ANION GAP SERPL CALCULATED.3IONS-SCNC: 13 MMOL/L (ref 3–16)
ANION GAP SERPL CALCULATED.3IONS-SCNC: 13 MMOL/L (ref 3–16)
ANION GAP SERPL CALCULATED.3IONS-SCNC: 15 MMOL/L (ref 3–16)
BANDED NEUTROPHILS RELATIVE PERCENT: 56 % (ref 0–7)
BASE EXCESS ARTERIAL: -7 MMOL/L (ref -3–3)
BASOPHILS ABSOLUTE: 0 K/UL (ref 0–0.2)
BASOPHILS RELATIVE PERCENT: 0 %
BUN BLDV-MCNC: 24 MG/DL (ref 7–20)
BUN BLDV-MCNC: 25 MG/DL (ref 7–20)
BUN BLDV-MCNC: 28 MG/DL (ref 7–20)
CALCIUM SERPL-MCNC: 6.9 MG/DL (ref 8.3–10.6)
CALCIUM SERPL-MCNC: 7.2 MG/DL (ref 8.3–10.6)
CALCIUM SERPL-MCNC: 7.4 MG/DL (ref 8.3–10.6)
CARBOXYHEMOGLOBIN ARTERIAL: 0.3 % (ref 0–1.5)
CHLORIDE BLD-SCNC: 100 MMOL/L (ref 99–110)
CHLORIDE BLD-SCNC: 101 MMOL/L (ref 99–110)
CHLORIDE BLD-SCNC: 105 MMOL/L (ref 99–110)
CO2: 17 MMOL/L (ref 21–32)
CO2: 20 MMOL/L (ref 21–32)
CO2: 20 MMOL/L (ref 21–32)
CREAT SERPL-MCNC: 2.3 MG/DL (ref 0.8–1.3)
CREAT SERPL-MCNC: 2.7 MG/DL (ref 0.8–1.3)
CREAT SERPL-MCNC: 2.8 MG/DL (ref 0.8–1.3)
EOSINOPHILS ABSOLUTE: 0 K/UL (ref 0–0.6)
EOSINOPHILS RELATIVE PERCENT: 0 %
GFR AFRICAN AMERICAN: 26
GFR AFRICAN AMERICAN: 27
GFR AFRICAN AMERICAN: 32
GFR NON-AFRICAN AMERICAN: 21
GFR NON-AFRICAN AMERICAN: 22
GFR NON-AFRICAN AMERICAN: 27
GLUCOSE BLD-MCNC: 102 MG/DL (ref 70–99)
GLUCOSE BLD-MCNC: 117 MG/DL (ref 70–99)
GLUCOSE BLD-MCNC: 177 MG/DL (ref 70–99)
HCO3 ARTERIAL: 17.1 MMOL/L (ref 21–29)
HCT VFR BLD CALC: 30.9 % (ref 40.5–52.5)
HCT VFR BLD CALC: 32.9 % (ref 40.5–52.5)
HEMOGLOBIN, ART, EXTENDED: 10.1 G/DL (ref 13.5–17.5)
HEMOGLOBIN: 10.5 G/DL (ref 13.5–17.5)
HEMOGLOBIN: 10.7 G/DL (ref 13.5–17.5)
L. PNEUMOPHILA SEROGP 1 UR AG: NORMAL
LACTIC ACID: 4.7 MMOL/L (ref 0.4–2)
LACTIC ACID: 4.8 MMOL/L (ref 0.4–2)
LACTIC ACID: 5.1 MMOL/L (ref 0.4–2)
LYMPHOCYTES ABSOLUTE: 0.2 K/UL (ref 1–5.1)
LYMPHOCYTES RELATIVE PERCENT: 1 %
MAGNESIUM: 1.7 MG/DL (ref 1.8–2.4)
MCH RBC QN AUTO: 30.3 PG (ref 26–34)
MCH RBC QN AUTO: 31 PG (ref 26–34)
MCHC RBC AUTO-ENTMCNC: 32.4 G/DL (ref 31–36)
MCHC RBC AUTO-ENTMCNC: 33.9 G/DL (ref 31–36)
MCV RBC AUTO: 91.7 FL (ref 80–100)
MCV RBC AUTO: 93.4 FL (ref 80–100)
METAMYELOCYTES RELATIVE PERCENT: 2 %
METHEMOGLOBIN ARTERIAL: 0.6 %
MONOCYTES ABSOLUTE: 0.5 K/UL (ref 0–1.3)
MONOCYTES RELATIVE PERCENT: 2 %
NEUTROPHILS ABSOLUTE: 23.5 K/UL (ref 1.7–7.7)
NEUTROPHILS RELATIVE PERCENT: 39 %
O2 CONTENT ARTERIAL: 14 ML/DL
O2 SAT, ARTERIAL: 98.5 %
O2 THERAPY: ABNORMAL
PCO2 ARTERIAL: 29.5 MMHG (ref 35–45)
PDW BLD-RTO: 13.8 % (ref 12.4–15.4)
PDW BLD-RTO: 13.8 % (ref 12.4–15.4)
PH ARTERIAL: 7.38 (ref 7.35–7.45)
PHOSPHORUS: 3.7 MG/DL (ref 2.5–4.9)
PHOSPHORUS: 3.7 MG/DL (ref 2.5–4.9)
PLATELET # BLD: 130 K/UL (ref 135–450)
PLATELET # BLD: 155 K/UL (ref 135–450)
PMV BLD AUTO: 8.6 FL (ref 5–10.5)
PMV BLD AUTO: 9.1 FL (ref 5–10.5)
PO2 ARTERIAL: 147.5 MMHG (ref 75–108)
POTASSIUM REFLEX MAGNESIUM: 3.5 MMOL/L (ref 3.5–5.1)
POTASSIUM SERPL-SCNC: 3.4 MMOL/L (ref 3.5–5.1)
POTASSIUM SERPL-SCNC: 3.5 MMOL/L (ref 3.5–5.1)
RBC # BLD: 3.37 M/UL (ref 4.2–5.9)
RBC # BLD: 3.52 M/UL (ref 4.2–5.9)
REPORT: NORMAL
SODIUM BLD-SCNC: 133 MMOL/L (ref 136–145)
SODIUM BLD-SCNC: 135 MMOL/L (ref 136–145)
SODIUM BLD-SCNC: 136 MMOL/L (ref 136–145)
STREP PNEUMONIAE ANTIGEN, URINE: NORMAL
TCO2 ARTERIAL: 18 MMOL/L
TOXIC GRANULATION: PRESENT
VACUOLATED NEUTROPHILS: PRESENT
WBC # BLD: 24.2 K/UL (ref 4–11)
WBC # BLD: 31.8 K/UL (ref 4–11)

## 2018-08-03 PROCEDURE — 6370000000 HC RX 637 (ALT 250 FOR IP): Performed by: FAMILY MEDICINE

## 2018-08-03 PROCEDURE — 2580000003 HC RX 258: Performed by: NURSE ANESTHETIST, CERTIFIED REGISTERED

## 2018-08-03 PROCEDURE — 3600000005 HC SURGERY LEVEL 5 BASE: Performed by: UROLOGY

## 2018-08-03 PROCEDURE — 3700000001 HC ADD 15 MINUTES (ANESTHESIA): Performed by: UROLOGY

## 2018-08-03 PROCEDURE — 3209999900 FLUORO FOR SURGICAL PROCEDURES

## 2018-08-03 PROCEDURE — 85025 COMPLETE CBC W/AUTO DIFF WBC: CPT

## 2018-08-03 PROCEDURE — 94002 VENT MGMT INPAT INIT DAY: CPT

## 2018-08-03 PROCEDURE — 2580000003 HC RX 258: Performed by: INTERNAL MEDICINE

## 2018-08-03 PROCEDURE — 85027 COMPLETE CBC AUTOMATED: CPT

## 2018-08-03 PROCEDURE — 3600000015 HC SURGERY LEVEL 5 ADDTL 15MIN: Performed by: UROLOGY

## 2018-08-03 PROCEDURE — 6360000002 HC RX W HCPCS: Performed by: INTERNAL MEDICINE

## 2018-08-03 PROCEDURE — 80069 RENAL FUNCTION PANEL: CPT

## 2018-08-03 PROCEDURE — G8997 SWALLOW GOAL STATUS: HCPCS

## 2018-08-03 PROCEDURE — 2580000003 HC RX 258: Performed by: FAMILY MEDICINE

## 2018-08-03 PROCEDURE — 99223 1ST HOSP IP/OBS HIGH 75: CPT | Performed by: INTERNAL MEDICINE

## 2018-08-03 PROCEDURE — 2500000003 HC RX 250 WO HCPCS: Performed by: INTERNAL MEDICINE

## 2018-08-03 PROCEDURE — 83605 ASSAY OF LACTIC ACID: CPT

## 2018-08-03 PROCEDURE — 6360000002 HC RX W HCPCS

## 2018-08-03 PROCEDURE — 36592 COLLECT BLOOD FROM PICC: CPT

## 2018-08-03 PROCEDURE — C2617 STENT, NON-COR, TEM W/O DEL: HCPCS | Performed by: UROLOGY

## 2018-08-03 PROCEDURE — 2000000000 HC ICU R&B

## 2018-08-03 PROCEDURE — 3700000000 HC ANESTHESIA ATTENDED CARE: Performed by: UROLOGY

## 2018-08-03 PROCEDURE — 94770 HC ETCO2 MONITOR DAILY: CPT

## 2018-08-03 PROCEDURE — 94761 N-INVAS EAR/PLS OXIMETRY MLT: CPT

## 2018-08-03 PROCEDURE — 74018 RADEX ABDOMEN 1 VIEW: CPT

## 2018-08-03 PROCEDURE — 92610 EVALUATE SWALLOWING FUNCTION: CPT

## 2018-08-03 PROCEDURE — 2500000003 HC RX 250 WO HCPCS: Performed by: NURSE ANESTHETIST, CERTIFIED REGISTERED

## 2018-08-03 PROCEDURE — 99291 CRITICAL CARE FIRST HOUR: CPT | Performed by: INTERNAL MEDICINE

## 2018-08-03 PROCEDURE — 2580000003 HC RX 258: Performed by: UROLOGY

## 2018-08-03 PROCEDURE — 94750 HC PULMONARY COMPLIANCE STUDY: CPT

## 2018-08-03 PROCEDURE — 99292 CRITICAL CARE ADDL 30 MIN: CPT | Performed by: INTERNAL MEDICINE

## 2018-08-03 PROCEDURE — 6360000002 HC RX W HCPCS: Performed by: ANESTHESIOLOGY

## 2018-08-03 PROCEDURE — 2580000003 HC RX 258: Performed by: EMERGENCY MEDICINE

## 2018-08-03 PROCEDURE — 2500000003 HC RX 250 WO HCPCS: Performed by: EMERGENCY MEDICINE

## 2018-08-03 PROCEDURE — C1769 GUIDE WIRE: HCPCS | Performed by: UROLOGY

## 2018-08-03 PROCEDURE — 82803 BLOOD GASES ANY COMBINATION: CPT

## 2018-08-03 PROCEDURE — G8996 SWALLOW CURRENT STATUS: HCPCS

## 2018-08-03 PROCEDURE — 2700000000 HC OXYGEN THERAPY PER DAY

## 2018-08-03 PROCEDURE — 2709999900 HC NON-CHARGEABLE SUPPLY: Performed by: UROLOGY

## 2018-08-03 PROCEDURE — 6360000002 HC RX W HCPCS: Performed by: NURSE ANESTHETIST, CERTIFIED REGISTERED

## 2018-08-03 PROCEDURE — 83735 ASSAY OF MAGNESIUM: CPT

## 2018-08-03 PROCEDURE — 36591 DRAW BLOOD OFF VENOUS DEVICE: CPT

## 2018-08-03 DEVICE — URETERAL STENT
Type: IMPLANTABLE DEVICE | Status: FUNCTIONAL
Brand: CONTOUR™

## 2018-08-03 RX ORDER — POTASSIUM CHLORIDE 29.8 MG/ML
20 INJECTION INTRAVENOUS ONCE
Status: COMPLETED | OUTPATIENT
Start: 2018-08-03 | End: 2018-08-03

## 2018-08-03 RX ORDER — SODIUM CHLORIDE 9 MG/ML
INJECTION, SOLUTION INTRAVENOUS
Status: DISPENSED
Start: 2018-08-03 | End: 2018-08-04

## 2018-08-03 RX ORDER — SODIUM BICARBONATE 42 MG/ML
INJECTION, SOLUTION INTRAVENOUS PRN
Status: DISCONTINUED | OUTPATIENT
Start: 2018-08-03 | End: 2018-08-03 | Stop reason: SDUPTHER

## 2018-08-03 RX ORDER — CIPROFLOXACIN 2 MG/ML
400 INJECTION, SOLUTION INTRAVENOUS EVERY 24 HOURS
Status: DISCONTINUED | OUTPATIENT
Start: 2018-08-03 | End: 2018-08-03

## 2018-08-03 RX ORDER — ATROPINE SULFATE 0.1 MG/ML
INJECTION INTRAVENOUS PRN
Status: DISCONTINUED | OUTPATIENT
Start: 2018-08-03 | End: 2018-08-03 | Stop reason: SDUPTHER

## 2018-08-03 RX ORDER — SODIUM CHLORIDE 9 MG/ML
INJECTION, SOLUTION INTRAVENOUS CONTINUOUS PRN
Status: DISCONTINUED | OUTPATIENT
Start: 2018-08-03 | End: 2018-08-03 | Stop reason: SDUPTHER

## 2018-08-03 RX ORDER — 0.9 % SODIUM CHLORIDE 0.9 %
1000 INTRAVENOUS SOLUTION INTRAVENOUS ONCE
Status: COMPLETED | OUTPATIENT
Start: 2018-08-03 | End: 2018-08-03

## 2018-08-03 RX ORDER — ATROPINE SULFATE 0.1 MG/ML
INJECTION INTRAVENOUS
Status: DISPENSED
Start: 2018-08-03 | End: 2018-08-04

## 2018-08-03 RX ORDER — MIDAZOLAM HYDROCHLORIDE 5 MG/ML
INJECTION INTRAMUSCULAR; INTRAVENOUS PRN
Status: DISCONTINUED | OUTPATIENT
Start: 2018-08-03 | End: 2018-08-03 | Stop reason: SDUPTHER

## 2018-08-03 RX ORDER — MAGNESIUM HYDROXIDE 1200 MG/15ML
LIQUID ORAL CONTINUOUS PRN
Status: COMPLETED | OUTPATIENT
Start: 2018-08-03 | End: 2018-08-03

## 2018-08-03 RX ORDER — DOPAMINE HYDROCHLORIDE 160 MG/100ML
5 INJECTION, SOLUTION INTRAVENOUS CONTINUOUS
Status: DISCONTINUED | OUTPATIENT
Start: 2018-08-03 | End: 2018-08-03

## 2018-08-03 RX ORDER — SODIUM CHLORIDE, SODIUM GLUCONATE, SODIUM ACETATE, POTASSIUM CHLORIDE AND MAGNESIUM CHLORIDE 526; 502; 368; 37; 30 MG/100ML; MG/100ML; MG/100ML; MG/100ML; MG/100ML
1000 INJECTION, SOLUTION INTRAVENOUS CONTINUOUS
Status: DISCONTINUED | OUTPATIENT
Start: 2018-08-03 | End: 2018-08-05

## 2018-08-03 RX ORDER — PROPOFOL 10 MG/ML
10 INJECTION, EMULSION INTRAVENOUS
Status: DISCONTINUED | OUTPATIENT
Start: 2018-08-03 | End: 2018-08-07

## 2018-08-03 RX ORDER — ROCURONIUM BROMIDE 10 MG/ML
INJECTION, SOLUTION INTRAVENOUS PRN
Status: DISCONTINUED | OUTPATIENT
Start: 2018-08-03 | End: 2018-08-03 | Stop reason: SDUPTHER

## 2018-08-03 RX ORDER — ETOMIDATE 2 MG/ML
INJECTION INTRAVENOUS PRN
Status: DISCONTINUED | OUTPATIENT
Start: 2018-08-03 | End: 2018-08-03 | Stop reason: SDUPTHER

## 2018-08-03 RX ORDER — CALCIUM CHLORIDE 100 MG/ML
INJECTION INTRAVENOUS; INTRAVENTRICULAR PRN
Status: DISCONTINUED | OUTPATIENT
Start: 2018-08-03 | End: 2018-08-03 | Stop reason: SDUPTHER

## 2018-08-03 RX ORDER — PROPOFOL 10 MG/ML
INJECTION, EMULSION INTRAVENOUS CONTINUOUS PRN
Status: DISCONTINUED | OUTPATIENT
Start: 2018-08-03 | End: 2018-08-03 | Stop reason: SDUPTHER

## 2018-08-03 RX ADMIN — SODIUM CHLORIDE: 9 INJECTION, SOLUTION INTRAVENOUS at 12:46

## 2018-08-03 RX ADMIN — NOREPINEPHRINE BITARTRATE 30 MCG/MIN: 1 INJECTION INTRAVENOUS at 15:23

## 2018-08-03 RX ADMIN — VASOPRESSIN 0.04 UNITS/MIN: 20 INJECTION INTRAVENOUS at 13:53

## 2018-08-03 RX ADMIN — MEROPENEM 500 MG: 500 INJECTION, POWDER, FOR SOLUTION INTRAVENOUS at 18:49

## 2018-08-03 RX ADMIN — DORZOLAMIDE HYDROCHLORIDE AND TIMOLOL MALEATE 1 DROP: 20; 5 SOLUTION/ DROPS OPHTHALMIC at 20:11

## 2018-08-03 RX ADMIN — BRIMONIDINE TARTRATE 1 DROP: 2 SOLUTION OPHTHALMIC at 09:49

## 2018-08-03 RX ADMIN — PROPOFOL 25 MCG/KG/MIN: 10 INJECTION, EMULSION INTRAVENOUS at 20:14

## 2018-08-03 RX ADMIN — SODIUM CHLORIDE, SODIUM GLUCONATE, SODIUM ACETATE, POTASSIUM CHLORIDE AND MAGNESIUM CHLORIDE 1000 ML: 526; 502; 368; 37; 30 INJECTION, SOLUTION INTRAVENOUS at 17:57

## 2018-08-03 RX ADMIN — POTASSIUM CHLORIDE 20 MEQ: 29.8 INJECTION, SOLUTION INTRAVENOUS at 14:30

## 2018-08-03 RX ADMIN — MIDAZOLAM HYDROCHLORIDE 2 MG: 5 INJECTION INTRAMUSCULAR; INTRAVENOUS at 12:47

## 2018-08-03 RX ADMIN — SODIUM BICARBONATE: 84 INJECTION, SOLUTION INTRAVENOUS at 10:40

## 2018-08-03 RX ADMIN — SODIUM CHLORIDE, PRESERVATIVE FREE 10 ML: 5 INJECTION INTRAVENOUS at 09:49

## 2018-08-03 RX ADMIN — SODIUM BICARBONATE 100 MEQ: 84 INJECTION, SOLUTION INTRAVENOUS at 20:13

## 2018-08-03 RX ADMIN — SODIUM CHLORIDE, SODIUM GLUCONATE, SODIUM ACETATE, POTASSIUM CHLORIDE AND MAGNESIUM CHLORIDE 1000 ML: 526; 502; 368; 37; 30 INJECTION, SOLUTION INTRAVENOUS at 09:42

## 2018-08-03 RX ADMIN — BRIMONIDINE TARTRATE 1 DROP: 2 SOLUTION OPHTHALMIC at 20:11

## 2018-08-03 RX ADMIN — NOREPINEPHRINE BITARTRATE 30 MCG/MIN: 1 INJECTION INTRAVENOUS at 04:45

## 2018-08-03 RX ADMIN — NOREPINEPHRINE BITARTRATE 28 MCG/MIN: 1 INJECTION INTRAVENOUS at 22:38

## 2018-08-03 RX ADMIN — SODIUM CHLORIDE 1000 ML: 9 INJECTION, SOLUTION INTRAVENOUS at 01:50

## 2018-08-03 RX ADMIN — PROPOFOL 20 MCG/KG/MIN: 10 INJECTION, EMULSION INTRAVENOUS at 13:41

## 2018-08-03 RX ADMIN — ETOMIDATE 20 MG: 2 INJECTION INTRAVENOUS at 13:02

## 2018-08-03 RX ADMIN — SODIUM BICARBONATE 100 MEQ: 84 INJECTION INTRAVENOUS at 09:42

## 2018-08-03 RX ADMIN — VASOPRESSIN 0.04 UNITS/MIN: 20 INJECTION INTRAVENOUS at 04:52

## 2018-08-03 RX ADMIN — EPINEPHRINE 4 MCG/MIN: 1 INJECTION PARENTERAL at 01:52

## 2018-08-03 RX ADMIN — LATANOPROST 1 DROP: 50 SOLUTION OPHTHALMIC at 20:11

## 2018-08-03 RX ADMIN — EPINEPHRINE 30 MCG/MIN: 1 INJECTION PARENTERAL at 17:57

## 2018-08-03 RX ADMIN — ATROPINE SULFATE 0.3 MG: 0.1 INJECTION, SOLUTION INTRAVENOUS at 12:54

## 2018-08-03 RX ADMIN — DORZOLAMIDE HYDROCHLORIDE AND TIMOLOL MALEATE 1 DROP: 20; 5 SOLUTION/ DROPS OPHTHALMIC at 09:49

## 2018-08-03 RX ADMIN — ROCURONIUM BROMIDE 20 MG: 10 SOLUTION INTRAVENOUS at 13:02

## 2018-08-03 RX ADMIN — CIPROFLOXACIN 400 MG: 2 INJECTION, SOLUTION INTRAVENOUS at 02:08

## 2018-08-03 RX ADMIN — MIDAZOLAM HYDROCHLORIDE 2 MG: 5 INJECTION INTRAMUSCULAR; INTRAVENOUS at 13:16

## 2018-08-03 RX ADMIN — SODIUM CHLORIDE, PRESERVATIVE FREE 10 ML: 5 INJECTION INTRAVENOUS at 20:12

## 2018-08-03 RX ADMIN — SODIUM BICARBONATE: 84 INJECTION, SOLUTION INTRAVENOUS at 14:58

## 2018-08-03 RX ADMIN — Medication 50 MEQ: at 13:20

## 2018-08-03 RX ADMIN — PROPOFOL 20 MCG/KG/MIN: 10 INJECTION, EMULSION INTRAVENOUS at 13:30

## 2018-08-03 RX ADMIN — CEFEPIME HYDROCHLORIDE 2 G: 2 INJECTION, POWDER, FOR SOLUTION INTRAVENOUS at 03:17

## 2018-08-03 RX ADMIN — CALCIUM CHLORIDE 1 G: 100 INJECTION, SOLUTION INTRAVENOUS at 13:10

## 2018-08-03 RX ADMIN — VASOPRESSIN 0.04 UNITS/MIN: 20 INJECTION INTRAVENOUS at 22:37

## 2018-08-03 ASSESSMENT — PULMONARY FUNCTION TESTS
PIF_VALUE: 21
PIF_VALUE: 25
PIF_VALUE: 22
PIF_VALUE: 24
PIF_VALUE: 21
PIF_VALUE: 22
PIF_VALUE: 21
PIF_VALUE: 23
PIF_VALUE: 20
PIF_VALUE: 22
PIF_VALUE: 23
PIF_VALUE: 22
PIF_VALUE: 0
PIF_VALUE: 23
PIF_VALUE: 0
PIF_VALUE: 0
PIF_VALUE: 22
PIF_VALUE: 23
PIF_VALUE: 24
PIF_VALUE: 23
PIF_VALUE: 0
PIF_VALUE: 0
PIF_VALUE: 22
PIF_VALUE: 21
PIF_VALUE: 1
PIF_VALUE: 22
PIF_VALUE: 21
PIF_VALUE: 24
PIF_VALUE: 1
PIF_VALUE: 25
PIF_VALUE: 0
PIF_VALUE: 0
PIF_VALUE: 1
PIF_VALUE: 23
PIF_VALUE: 24
PIF_VALUE: 0
PIF_VALUE: 21
PIF_VALUE: 21
PIF_VALUE: 0
PIF_VALUE: 20
PIF_VALUE: 27
PIF_VALUE: 24
PIF_VALUE: 22
PIF_VALUE: 22
PIF_VALUE: 0
PIF_VALUE: 1
PIF_VALUE: 21
PIF_VALUE: 0
PIF_VALUE: 0

## 2018-08-03 NOTE — PROGRESS NOTES
Speech Language Pathology  Attempt Note    SLP acknowledged new order for evaluation. SLP reviewed pt's chart and attempted to speak with RN, however she was currently entering pt's room for pt care at this time. SLP will continue to follow and will re-attempt later this date as schedule allows and pt is appropriate. Thank you. Anjelica WILKS CCC-SLP  Speech-Language Pathologist  [phone]

## 2018-08-03 NOTE — ANESTHESIA PRE PROCEDURE
Syncope and collapse A36    Complicated UTI (urinary tract infection) N39.0    E coli bacteremia R78.81    Infection due to ESBL-producing Escherichia coli A49.8, Z16.12    Septic shock ([facility]) A41.9, R65.21    Pyuria N39.0    GONSALO (acute kidney injury) ([facility] [address] N17.9    Lactic acidosis E87.2    Elevated troponin R74.8    Open-angle glaucoma H40.10X0    Altered mental status R41.82    Bacteremia due to Pseudomonas R78.81    Pulmonary infiltrates R91.8    Nephrolithiasis N20.0    Hydronephrosis with urinary obstruction due to renal calculus N13.2    Fecal impaction ([facility]) K56.41    Pancreatic cyst K86.2       Past Medical History:        Diagnosis Date    Altered mental status     Anarthria     Anemia     Arthritis     Atrial fibrillation ([facility])     BPH (benign prostatic hyperplasia)     CHF (congestive heart failure) ([facility])     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 No pertinent surgical history.     Social History:    Social History   Substance Use Topics    Smoking status: Never Smoker    Smokeless tobacco: Never Used    Alcohol use No                                Counseling given: Not Answered      Vital Signs (Current):   Vitals:    08/03/18 0945 08/03/18 1000 08/03/18 1032 08/03/18 1102   BP: (!) 139/92 92/61 (!) 91/58 89/64   Pulse: 82 82 77 82   Resp: 21 13 23 30   Temp:       TempSrc:       SpO2: 97% 96% 98% 100%   Weight:       Height:                                                  BP Readings from Last 3 Encounters:   08/03/18 89/64   06/23/15 (!) 149/91       NPO Status: procedure and post op period. D/w patient and family high likelihood of post op mechanical ventilation and continued ICU stay. Patient and surgeon agreeable to anesthetic plan. )  Induction: intravenous. Anesthetic plan and risks discussed with patient and spouse. Plan discussed with CRNA.                 Unique Das MD   8/3/2018

## 2018-08-03 NOTE — PROGRESS NOTES
Speech Language Pathology  Facility/Department: [address] [place] [address]   BEDSIDE SWALLOW EVALUATION    NAME: Mark Morillo  : 1928  MRN: [id number]    ADMISSION DATE: 2018  ADMITTING DIAGNOSIS: has Sepsis ([facility] [address] Lower urinary tract infectious disease; Debility; Leukocytosis; Syncope and collapse; Complicated UTI (urinary tract infection); E coli bacteremia; Infection due to ESBL-producing Escherichia coli; Septic shock ([facility] [address] Pyuria; GONSALO (acute kidney injury) ([facility] [address] Lactic acidosis; Elevated troponin; Open-angle glaucoma; and Altered mental status on his problem list.  ONSET DATE: 18    Recent Chest Xray/CT of Chest: (18)  Impression   Curvilinear opacity within the medial right lung base, which could represent   atelectasis, pneumonia, or aspiration. Date of Eval: 8/3/2018  Evaluating Therapist: Radha Patten    Current Diet level:  Current Diet : Regular  Current Liquid Diet : Thin      Primary Complaint  Patient Complaint: No complaints from pt at this time. Pain:  Pain Assessment  Patient Currently in Pain: Denies  Pain Level: 0    Reason for Referral  Mark Morillo was referred for a bedside swallow evaluation to assess the efficiency of his swallow function, identify signs and symptoms of aspiration and make recommendations regarding safe dietary consistencies, effective compensatory strategies, and safe eating environment. Impression  Dysphagia Diagnosis: Mild to moderate oral stage dysphagia;Mild pharyngeal stage dysphagia  Dysphagia Outcome Severity Scale: Level 4: Mild moderate dysphagia- Intermittent supervision/cueing. One - two diet consistencies restricted    Pt alert and oriented to self, cooperative and agreeable to participate in evaluation. RN reported that pt's mentation may be his baseline but unsure, no family members present at bedside. Pt presented with overall reduced lingual strength and coordination. Pt with several missing teeth.   Pt intermittently able to follow simple one and two step commands. Pt observed with ice chips tolerating without difficulty. Pt observed with thin liquids via teaspoon suspecting premature bolus loss to pharynx, wet vocal quality, and O2 sats dropped from 96% to 92% and quickly recovered. Pt observed with nectar thick liquids via teaspoon and cup tolerating without any overt s/s of aspiration. No coughing, wet vocal quality, or throat clearing observed. Pt observed with 1/2 teaspoon and whole teaspoon of puree applesauce demonstrating with adequate mastication, decreased A-P transit, no s/s of aspiration. Pt observed with soft solid (crushed kari cracker with applesauce) demonstrating with prolonged mastication, decreased A-P transit, but no s/s of aspiration. Due to pt's current DERECK and cognitive status, SLP is recommending a Dysphagia I Pureed diet with nectar thick liquids; meds crushed in puree; total assistance with all meals. If cognitive status worsens or respiratory status is compromised, consider downgrading to NPO. RN informed. Treatment Plan  Requires SLP Intervention: Yes  Duration/Frequency of Treatment: 3-5x/wk during acute LOS  D/C Recommendations: To be determined       Recommended Diet and Intervention  Diet Solids Recommendation: Dysphagia I Pureed  Liquid Consistency Recommendation: Nectar  Recommended Form of Meds: Crushed in puree as able  Therapeutic Interventions: Diet tolerance monitoring;Patient/Family education; Therapeutic PO trials with SLP    Compensatory Swallowing Strategies  · Alternate solids and liquids  · Eat/Feed slowly  · No straws  · Upright as possible for all oral intake  · Remain upright for 30-45 minutes after meals  · Total feed    Treatment/Goals  Short-term Goals  Timeframe for Short-term Goals: 1 week  Long-term Goals  Timeframe for Long-term Goals: 1-2 weeks  Goal 1: Pt will tolerate safest and least restrictive diet without any clinical s/s of aspiration. Dysphagia Goals:   1. The patient will tolerate recommended diet without observed clinical signs of aspiration. 2. The patient/caregiver will demonstrate understanding of compensatory strategies for improved swallowing safety. 3. The patient will tolerate mechanical soft foods 10/10.  4. The patient will tolerate thin liquids without signs and symptoms of aspiration 10/10 via cup. General  Chart Reviewed: Yes  Subjective: Pt alert and oriented to self, cooperative and agreeable to participate in evaluation. RN ok'd SLP entry into room. Behavior/Cognition: Alert; Cooperative;Pleasant mood;Confused  Respiratory Status: O2 via nasual cannula  O2 Device: Nasal cannula  Communication Observation:  (primarily non-verbal during evaluation, able to answer yes/no )  Follows Directions: Simple  Dentition: Some missing teeth  Patient Positioning: Upright in bed  Baseline Vocal Quality: Weak  Volitional Cough: Weak  Prior Dysphagia History: Pt was evaluated and treated by speech therapy at [facility] for dysphagia, recommending a regular texture diet with thin liquids. Consistencies Administered: Soft solid;Puree; Ice Chips; Thin - teaspoon;Nectar - teaspoon;Nectar - cup    Vision/Hearing  Vision: Impaired  Hearing: Exceptions to [facility]  Hearing Exceptions: Hard of hearing/hearing concerns    Oral Motor Deficits  Oral Motor: Exceptions to [facility]  Lingual Strength: Reduced  Lingual Coordination: Reduced    Oral Phase Dysfunction  Oral Phase: Exceptions  Oral Phase Dysfunction  Decreased Anterior to Posterior Transit: All  Suspected Premature Bolus Loss: Thin - teaspoon     Indicators of Pharyngeal Phase Dysfunction  Pharyngeal Phase: Exceptions  Indicators of Pharyngeal Phase Dysfunction  Delayed Swallow: All  Decreased Laryngeal Elevation: All  Wet Vocal Quality: Thin - teaspoon  Change in Vital Signs:  Thin - teaspoon    Prognosis  Prognosis for safe diet advancement: fair  Barriers to reach goals: cognitive

## 2018-08-03 NOTE — CONSULTS
4 mm obstructing nephrolithiasis within the right proximal ureter causing   moderate hydronephrosis. 2. Bilateral lower lobe pneumonia.  Recommend chest radiograph in 8 weeks to   confirm resolution. 3. Mildly dilated appendix without periappendiceal inflammation can be seen   in the setting of early acute appendicitis. 4. Unchanged 2.9 cm pancreatic cystic lesion. 5. Fecal impaction. Impression/Plan: UTI, right ureteral obstruction due to stone, sepsis  1. Pt has sepsis likely due to obstucting stone and UTI. He has leukocytosis and will unlikely get better unless a ureteral stent is placed. I had a discussion with family and they would like to proceed with cystoscopy and right stent placement. They understand the gravity of the situation and the anesthesia risk. He is very ill. They understand that I will not be removing the stone, just placing a stent. They understand the stent will have to be removed/replaced in the future. 2. To OR for right stent  3.  On IV abx    Corinna Mckeon MD

## 2018-08-03 NOTE — PROGRESS NOTES
08/03/18 1519   Vent Information   Vent Type 840   Vent Mode AC/VC   Vt Ordered 500 mL   Rate Set 14 bmp   Peak Flow 55 L/min   Pressure Support 0 cmH20   FiO2  50 %   Sensitivity 3   PEEP/CPAP 5   Vent Patient Data   Vt Exhaled 450 mL   Rate Measured 14 br/min   Minute Volume 6.29 Liters   Peak Inspiratory Pressure 21 cmH2O   I:E Ratio 1:3.30   Mean Airway Pressure 10 cmH20   Spontaneous Breathing Trial (SBT) RT Doc   Pulse 79   Breath Sounds   Right Upper Lobe Diminished   Right Middle Lobe Diminished   Right Lower Lobe Diminished   Left Upper Lobe Diminished   Left Lower Lobe Diminished   Additional Respiratory  Assessments   Resp 15   End Tidal CO2 26 (%)   Oral Care Mouth suctioned   Alarm Settings   High Pressure Alarm 45 cmH2O   Low Minute Volume Alarm 2 L/min   High Respiratory Rate 40 br/min   Low Exhaled Vt  200 mL   Patient Observation   Observations AMBU BAG AND MASK AT BEDSIDE, ALARMS ON AND AUDIBLE, APNEA PARAMETERS ON   ETT (adult)   Placement Date/Time: 08/03/18 1305   Preoxygenation: Yes  Mask Ventilation: Mask ventilation not attempted (0)  Technique: Direct laryngoscopy;Video laryngoscopy  Type: Cuffed  Tube Size: 7 mm  Laryngoscope: Mac  Blade Size: 4  Location: Oral  Grade V...    Secured at 23 cm   Measured From Lips   ET Placement Left   Secured By Commercial tube boyd   Site Condition Dry

## 2018-08-03 NOTE — PROGRESS NOTES
Spoke to Dr. Sherri Mckeon regarding plan of care. Aware patient went to OR. Aware of HR issues before going to OR and RFP and lactic acid levels and results. Aware patient having frequent runs of PVCs and missed beats. Orders received for one time dose of potassium, states \"Patient is too unstable to start CRRT at this time. \"  Will continue to monitor and assess.

## 2018-08-03 NOTE — CONSULTS
CLARITYU  Clear   SPECGRAV  1.010   LEUKOCYTESUR  LARGE*   UROBILINOGEN  4.0*   BILIRUBINUR  Negative   BLOODU  MODERATE*   GLUCOSEU  Negative   AMORPHOUS  Rare*       Imaging:  I have reviewed radiology images personally. XR CHEST PORTABLE   Final Result   Curvilinear opacity within the medial right lung base, which could represent   atelectasis, pneumonia, or aspiration. XR CHEST PORTABLE   Final Result   1. Right internal jugular catheter terminating in the mid SVC. CT HEAD WO CONTRAST   Final Result   No acute intracranial abnormality. CT ABDOMEN PELVIS WO CONTRAST Additional Contrast? None    (Results Pending)     Ct Head Wo Contrast    Result Date: 8/2/2018  EXAMINATION: CT OF THE HEAD WITHOUT CONTRAST  8/2/2018 12:51 pm TECHNIQUE: CT of the head was performed without the administration of intravenous contrast. Dose modulation, iterative reconstruction, and/or weight based adjustment of the mA/kV was utilized to reduce the radiation dose to as low as reasonably achievable. COMPARISON: CT 06/09/2015 HISTORY: ORDERING SYSTEM PROVIDED HISTORY: AMS TECHNOLOGIST PROVIDED HISTORY: Has a \"code stroke\" or \"stroke alert\" been called? ->No Ordering Physician Provided Reason for Exam: AMS this AM after having an episode of vomiting,  pt is mostly back to normal as of now per family member. pt was unresopnsive when squad was called. Acuity: Acute Type of Exam: Initial Relevant Medical/Surgical History: HX of CVA FINDINGS: BRAIN/VENTRICLES: There is no acute intracranial hemorrhage, mass effect or midline shift. No abnormal extra-axial fluid collection. The gray-white differentiation is maintained without evidence of an acute infarct. There is no evidence of hydrocephalus. Mild atrophic changes. Mild periventricular white matter hypoattenuation, likely due to small vessel ischemic disease. ORBITS: The visualized portion of the orbits demonstrate no acute abnormality.  SINUSES: The visualized paranasal sinuses and mastoid air cells demonstrate no acute abnormality. SOFT TISSUES/SKULL:  No acute abnormality of the visualized skull or soft tissues. No acute intracranial abnormality. Xr Chest Portable    Result Date: 8/2/2018  EXAMINATION: SINGLE XRAY VIEW OF THE CHEST 8/2/2018 11:20 am COMPARISON: 06/09/2015 HISTORY: ORDERING SYSTEM PROVIDED HISTORY: vomit, altered mental status; r/o aspiration TECHNOLOGIST PROVIDED HISTORY: Reason for exam:->vomit, altered mental status; r/o aspiration Ordering Physician Provided Reason for Exam: vomit, altered mental status; r/o aspiration Acuity: Acute Type of Exam: Initial FINDINGS: A single frontal view of the chest demonstrates no acute osseous abnormality. The heart size and mediastinal contours are stable and within normal limits. Curvilinear opacity within mid right lung is felt to represent a vascular shadow. There is mild curvilinear opacity within the medial right lung base, which could represent atelectasis, pneumonia, or aspiration. The left lung is clear. Curvilinear opacity within the medial right lung base, which could represent atelectasis, pneumonia, or aspiration. Xr Chest Portable    Result Date: 8/2/2018  EXAMINATION: SINGLE XRAY VIEW OF THE CHEST 8/2/2018 4:59 pm COMPARISON: 08/02/2018 HISTORY: ORDERING SYSTEM PROVIDED HISTORY: line placement TECHNOLOGIST PROVIDED HISTORY: Reason for exam:->line placement Ordering Physician Provided Reason for Exam: line placement Acuity: Acute Type of Exam: Initial FINDINGS: The right internal jugular catheter terminates in the mid SVC. There is bibasilar scarring and/or atelectasis. The cardiac silhouette is within normal limits. There is no pneumothorax or pleural effusion. Degenerative changes involve the right shoulder. 1. Right internal jugular catheter terminating in the mid SVC.            Echocardiogram: In 2015-Summary   Ejection fraction is visually estimated to be 55-60 %.   The

## 2018-08-03 NOTE — PROGRESS NOTES
Hospitalist Progress Note      PCP: Damir Dennis    Date of Admission: 8/2/2018    Chief Complaint: confused and lethargic    Hospital Course: this is 80-year-old male admitted with a septic shock to ICU, acute encephalopathy now on 3 Pressor cultures growing pseudomonas, UTI, CT of the abdomen and pelvis shows right hydronephrosis urology consulted, acute kidney injury nephrology and poverty critical care consulted and following, prognosis is poor palliative care consulted, waiting for family decision. Subjective: patient is lying in the bed comfortable. Medications:  Reviewed    Infusion Medications    EPINEPHrine infusion 6 mcg/min (08/03/18 0743)    sodium chloride 100 mL/hr at 08/02/18 2002    norepinephrine 30 mcg/min (08/03/18 0445)    vasopressin infusion 0.04 Units/min (08/03/18 0452)     Scheduled Medications    ciprofloxacin  400 mg Intravenous Q24H    cefepime  2 g Intravenous Q24H    apixaban  5 mg Oral BID    ascorbic acid  500 mg Oral Daily    brimonidine  1 drop Both Eyes BID    docusate sodium  100 mg Oral BID    dorzolamide-timolol  1 drop Both Eyes BID    ferrous sulfate  325 mg Oral Daily with breakfast    latanoprost  1 drop Both Eyes Nightly    pantoprazole  40 mg Oral QAM AC    senna  1 tablet Oral BID    sodium chloride flush  10 mL Intravenous 2 times per day    lactobacillus  1 capsule Oral BID WC     PRN Meds: sodium chloride flush, magnesium hydroxide, ondansetron      Intake/Output Summary (Last 24 hours) at 08/03/18 0801  Last data filed at 08/03/18 0459   Gross per 24 hour   Intake             2559 ml   Output              145 ml   Net             2414 ml       Physical Exam Performed:    BP (!) 72/47   Pulse 83   Temp 98.9 °F (37.2 °C) (Axillary)   Resp 28   Ht 5' 9\" (1.753 m)   Wt 158 lb (71.7 kg)   SpO2 97%   BMI 23.33 kg/m²     General appearance: No apparent distress, appears stated age and cooperative.   HEENT: Pupils equal, round, and dilated appendix without periappendiceal inflammation can be seen   in the setting of early acute appendicitis. 4. Unchanged 2.9 cm pancreatic cystic lesion. 5. Fecal impaction. XR CHEST PORTABLE   Final Result   Curvilinear opacity within the medial right lung base, which could represent   atelectasis, pneumonia, or aspiration. XR CHEST PORTABLE   Final Result   1. Right internal jugular catheter terminating in the mid SVC. CT HEAD WO CONTRAST   Final Result   No acute intracranial abnormality. Assessment/Plan:    Active Hospital Problems    Diagnosis Date Noted    Septic shock ([facility] [address] [A41.9, R65.21] 08/02/2018    Pyuria [N39.0] 08/02/2018    GONSALO (acute kidney injury) ([address] [N17.9] 08/02/2018    Lactic acidosis [E87.2] 08/02/2018    Elevated troponin [R74.8] 08/02/2018    Open-angle glaucoma [H40.10X0] 08/02/2018    Altered mental status [R41.82]     Sepsis ([facility] [address] [A41.9] 06/13/2015     1. this is 51-year-old male admitted with a UTI, sepsis, right ureteral obstruction due to stone on IV antibiotic, nephrology, critical care, neurology consulted and following, urology consulted patient is going to OR for right stent, prognosis grim palliative care consulted. Hypotension on 3 pressors. 2.  Acute renal failure continue with IV fluid per nephrology check daily BMP. 3.  History for DVT, eliquis discontinued. 4.  Lactic acidosis due to sepsis. 5.  Hypotension on pressors.     DVT Prophylaxis: SCDs  Diet: DIET GENERAL;  Code Status: DNR-CCA    PT/OT Eval Status:     Ivelisse Gross MD

## 2018-08-03 NOTE — CONSULTS
Thank you to requesting provider: Dr. Negrito Keys, for asking us to see Michelle Gomez  Reason for consultation:   Acute Kidney Injury  Chief Complaint:  AMS    History of Presenting Illness      79 y/o male admitted to the ICU with septic shock. He presented with AMS, he remains non responsive. At this point he is on 3 pressors and his blood pressure is in the 70s. We have been asked to see him for acute kidney injury. Scr was 1.4 last year. Now Scr is 2.7 and he is oliguric. Lactic acid is worsening. CT showed there is a 4 mm stone in the right kidney which is obstructing and causing moderate hydronephrosis. WBC is worsening. Past Medical/Surgical History      Active Ambulatory Problems     Diagnosis Date Noted    Sepsis ([address] 06/13/2015    Lower urinary tract infectious disease 06/13/2015    Debility 06/13/2015    Leukocytosis 06/15/2015    Syncope and collapse     Complicated UTI (urinary tract infection)     E coli bacteremia 06/17/2015    Infection due to ESBL-producing Escherichia coli 06/19/2015     Resolved Ambulatory Problems     Diagnosis Date Noted    No Resolved Ambulatory Problems     Past Medical History:   Diagnosis Date    Altered mental status     Anarthria     Anemia     Arthritis     Atrial fibrillation (HCC)     BPH (benign prostatic hyperplasia)     CHF (congestive heart failure) (HCC)     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 Discussed with daughter       Medications      Reviewed in EMR     Allergies     Patient has no known allergies.       Family History       Negative for Kidney Disease    Social History      Social History     Social History    Marital status:      Spouse name: N/A    Number of children: N/A    Years of education: N/A     Social History Main Topics    Smoking status: Never Smoker    Smokeless tobacco: Never Used    Alcohol use No    Drug use: No    Sexual activity: Not Asked     Other Topics Concern    None     Social History Narrative    None       Physical Exam     Blood pressure (!) 72/47, pulse 83, temperature 98.9 °F (37.2 °C), temperature source Axillary, resp. rate 28, height 5' 9\" (1.753 m), weight 158 lb (71.7 kg), SpO2 97 %. General:  Critically ill, not responsive   HEENT:  PERRL, EOMI  Neck:  Supple, normal range of movement  Chest:  CTAB, good respiratory effort, good air movement  CV:  Regular, no rub   Abdomen:  NTND, soft, +BS, no hepatosplenomegaly  Extremities:  No peripheral edema  Neurological:  Moving all four extremities, CN II-XII grossly intact  Lymphatics:  No palpable lymph nodes  Skin:  No rash, no jaundice  Psychiatric:  Not responding    Data     Recent Labs      08/02/18   1137  08/03/18   0443   WBC  3.3*  24.2*   HGB  14.1  10.7*   HCT  42.7  32.9*   MCV  93.3  93.4   PLT  224  155     Recent Labs      08/02/18   1137  08/03/18   0443   NA  136  135*   K  3.3*  3.5   CL  98*  105   CO2  23  17*   GLUCOSE  80  102*   MG   --   1.70*   BUN  22*  24*   CREATININE  2.4*  2.7*   LABGLOM  26*  22*   GFRAA  31*  27*       Assessment:    Acute Kidney Injury:  KDIGO stage 3  - Etiology:  ATN / septic shock. Some contribution of the obstructed right kidney but clearly systemic intrinsic renal injury    Metabolic Acidosis:  Worsening. Due to chloride loading. Anion gap is 13 but lactic acid is climbing and he has a very bad trajectory     Septic Shock:  On 3 pressors and IV fluids, still hypotensive. We do not have source control with right obstructive nephrolithiasis and hydronephrosis. Source is likely to be pyelonephritis.

## 2018-08-03 NOTE — CONSULTS
Infectious Diseases   Consult Note      Reason for Consult: sepsis, bacteremia, obstructing stone   Requesting Physician: Tunde Velasco MD      Date of Admission: 8/2/2018  Subjective:   CHIEF COMPLAINT:  None given       HPI:    Alexander Carbajal is a 80yoM with history of urethral stricture, suprapubic tube in place. SPT last exchanged 8/1/18. He was brought into ER 8/2 with report of altered mental status, emesis. High grade fever was present. CCT and CXR were negative. UA from the SPT with large LE and positive nitrite and significant pyuria. The WBC was 3.3. Renal indices were elevated. Initial lactate was 5. He was admitted to the ICU for management of sepsis and has required 3 vasopressor agents. CT abd was obtained overnight revealing obstructing stone. He was seen by Urology today and taken to OR for cysto and ureteral stent placement             Today he is afebrile   WBC is up to 24  Renal function worsening with minimal UOP. Blood cultures are positive for E coli and Pseudomonas, UC with GNR     Prior history of UTI with bacteremia, E coli sensitive to cefepime        Current abx:  Cefepime 2g q24  cipro 400 IV q24       Past Surgical History:       Diagnosis Date    Altered mental status     Anarthria     Anemia     Arthritis     Atrial fibrillation (HCC)     BPH (benign prostatic hyperplasia)     CHF (congestive heart failure) (HCC)     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     Dementia     Dysarthria     Dysphagia     GERD (gastroesophageal reflux disease)     Glaucoma     Hyperlipidemia     Hypertension     Obstructive and reflux uropathy     Stricture of ureter     Suprapubic catheter ([address]     Urinary retention     UTI (urinary tract infection)        Social History:    TOBACCO:   reports that he has never smoked. He has never used smokeless tobacco.  ETOH:   reports that he does not drink alcohol.   There is no history of illicit drug use or other significant epidemiologic exposures. Family History:   History reviewed. No pertinent family history. There is no family history of autoimmune diseases or significant infectious diseases.       Current Medications:    Current Facility-Administered Medications: ciprofloxacin (CIPRO) IVPB 400 mg, 400 mg, Intravenous, Q24H  cefepime (MAXIPIME) 2 g IVPB minibag, 2 g, Intravenous, Q24H  EPINEPHrine (EPINEPHrine HCL) 5 mg in dextrose 5 % 250 mL infusion, 1 mcg/min, Intravenous, Continuous  sodium bicarbonate 150 mEq in dextrose 5 % 1,000 mL infusion, , Intravenous, Continuous  electrolyte-A (PLASMALYTE-A) solution 1,000 mL, 1,000 mL, Intravenous, Continuous  propofol 1000 MG/100ML injection, 10 mcg/kg/min, Intravenous, Titrated  apixaban (ELIQUIS) tablet 5 mg, 5 mg, Oral, BID  vitamin C (ASCORBIC ACID) tablet 500 mg, 500 mg, Oral, Daily  brimonidine (ALPHAGAN) 0.2 % ophthalmic solution 1 drop, 1 drop, Both Eyes, BID  docusate sodium (COLACE) capsule 100 mg, 100 mg, Oral, BID  dorzolamide-timolol (COSOPT) 22.3-6.8 MG/ML ophthalmic solution 1 drop, 1 drop, Both Eyes, BID  ferrous sulfate tablet 325 mg, 325 mg, Oral, Daily with breakfast  latanoprost (XALATAN) 0.005 % ophthalmic solution 1 drop, 1 drop, Both Eyes, Nightly  pantoprazole (PROTONIX) tablet 40 mg, 40 mg, Oral, QAM AC  senna (SENOKOT) tablet 8.6 mg, 1 tablet, Oral, BID  sodium chloride flush 0.9 % injection 10 mL, 10 mL, Intravenous, 2 times per day  sodium chloride flush 0.9 % injection 10 mL, 10 mL, Intravenous, PRN  magnesium hydroxide (MILK OF MAGNESIA) 400 MG/5ML suspension 30 mL, 30 mL, Oral, Daily PRN  ondansetron (ZOFRAN) injection 4 mg, 4 mg, Intravenous, Q6H PRN  norepinephrine (LEVOPHED) 16 mg in dextrose 5 % 250 mL infusion, 5 mcg/min, Intravenous, Continuous  lactobacillus (CULTURELLE) capsule 1 capsule, 1 capsule, Oral, BID WC  vasopressin 20 Units in dextrose 5 % 100 mL infusion, 0.04 Units/min, Intravenous, Continuous      No Known the likelihood of carbepenem resistant organisms is acceptably low. Dose reduced for GONSALO   Can de-escalate once susceptibilities are available   Repeat BC to document clearance   Will ask Surgery to comment on CT findings of appendicitis     Prognosis poor    Case d/w Dr. Danna Franco   We will follow        Vadim Osei M.D. Thank you for the opportunity to participate in the care of your patient.     Please do not hesitate to contact me:   [phone] office  [phone] mobile

## 2018-08-03 NOTE — BRIEF OP NOTE
Brief Postoperative Note  ______________________________________________________________    Patient: Steffi Pastrana  YOB: 1928  MRN: [id number]  Date of Procedure: 8/3/2018    Pre-Op Diagnosis: URETERAL STONE, sepsis    Post-Op Diagnosis: Same       Procedure(s):  CYSTOSCOPY URETERAL STENT INSERTION    Anesthesia: General    Surgeon(s):  Hellen Araiza MD    Staff:  Surgical Assistant: Lena Banks  Scrub Person First: Gume Ramires  Scrub Person Second:  Sonam Arnett     Estimated Blood Loss: * No values recorded between 8/3/2018 12:47 PM and 8/3/2018  1:33 PM * None    Complications: None    Specimens:   * No specimens in log *    Implants:  * No implants in log *      Drains:   Suprapubic Catheter (Active)   Site Assessment No urethral drainage 8/2/2018  6:00 PM   Urine Color Lluvia 8/3/2018 10:56 AM   Urine Appearance Cloudy 8/3/2018 10:56 AM   Output (mL) 100 mL 8/3/2018 10:56 AM       Findings: bladder stone, 18 Fr SP tube exchanged, 6Fr x 24cm JJ stent    Plan- supportive care, will need stent exchange/removal in 4 months    Hellen Araiza MD  Date: 8/3/2018  Time: 1:33 PM

## 2018-08-03 NOTE — PROGRESS NOTES
INPATIENT PULMONARY CRITICAL CARE PROGRESS NOTE      Reason for visit    Septic shock    SUBJECTIVE:  Patient decompensated further last night and the blood pressure was not holding inspite of IV fluids and Levophed and sequentially vasopressin and  Epinephrine infusions had to be added overnight to hold the blood pressure;patient when seen this morning continues to be on 3 pressors to hold the blood pressure which is still precarious ; Patient's Tmax was 101 F;patient still not very responsive;patient is on 3 lts/min of nasal cannula oxygen with Sao2 of 100%;patient has not had any significant urine o/p overnight;no other pertinent ROS could be obtained       Physical Exam:  Blood pressure 89/64, pulse 82, temperature 97.8 °F (36.6 °C), temperature source Oral, resp. rate 30, height 5' 9\" (1.753 m), weight 158 lb (71.7 kg), SpO2 100 %.'   Constitutional:  No acute distress. HENT:  Oropharynx is clear and moist. No thyromegaly. Eyes:  Conjunctivae are normal. Pupils equal, round, and reactive to light. No scleral icterus. Neck: . No tracheal deviation present. No obvious thyroid mass. Cardiovascular: Normal rate, regular rhythm, normal heart sounds. No right ventricular heave. No lower extremity edema. Pulmonary/Chest: No wheezes. No rales. Chest wall is not dull to percussion. No accessory muscle usage or stridor. Abdominal: Soft. Bowel sounds present. No distension or hernia. No tenderness. Musculoskeletal: No cyanosis. No clubbing. No obvious joint deformity. Lymphadenopathy: No cervical or supraclavicular adenopathy.    Skin: Flaky skin   Neurologic: Not very responsive when seen         Results:  CBC:   Recent Labs      08/02/18   1137  08/03/18   0443   WBC  3.3*  24.2*   HGB  14.1  10.7*   HCT  42.7  32.9*   MCV  93.3  93.4   PLT  224  155     BMP:   Recent Labs      08/02/18   1137  08/03/18   0443   NA  136  135*   K  3.3*  3.5   CL  98*  105   CO2  23  17*   BUN  22*  24*   CREATININE 2. 4*  2.7*     LIVER PROFILE:   Recent Labs      08/02/18   1137   AST  49*   ALT  30   BILITOT  0.8   ALKPHOS  192*     UA:  Recent Labs      08/02/18   1415   COLORU  Yellow   PHUR  6.0   WBCUA  >100*   RBCUA  *   BACTERIA  1+*   CLARITYU  Clear   SPECGRAV  1.010   LEUKOCYTESUR  LARGE*   UROBILINOGEN  4.0*   BILIRUBINUR  Negative   BLOODU  MODERATE*   GLUCOSEU  Negative   AMORPHOUS  Rare*       Imaging:  I have reviewed radiology images personally. CT ABDOMEN PELVIS WO CONTRAST Additional Contrast? None   Final Result   1. 4 mm obstructing nephrolithiasis within the right proximal ureter causing   moderate hydronephrosis. 2. Bilateral lower lobe pneumonia. Recommend chest radiograph in 8 weeks to   confirm resolution. 3. Mildly dilated appendix without periappendiceal inflammation can be seen   in the setting of early acute appendicitis. 4. Unchanged 2.9 cm pancreatic cystic lesion. 5. Fecal impaction. XR CHEST PORTABLE   Final Result   Curvilinear opacity within the medial right lung base, which could represent   atelectasis, pneumonia, or aspiration. XR CHEST PORTABLE   Final Result   1. Right internal jugular catheter terminating in the mid SVC. CT HEAD WO CONTRAST   Final Result   No acute intracranial abnormality. XR ABDOMEN (KUB) (SINGLE AP VIEW)    (Results Pending)     Ct Abdomen Pelvis Wo Contrast Additional Contrast? None    Result Date: 8/2/2018  EXAMINATION: CT OF THE ABDOMEN AND PELVIS WITHOUT CONTRAST 8/2/2018 9:13 pm TECHNIQUE: CT of the abdomen and pelvis was performed without the administration of intravenous contrast. Multiplanar reformatted images are provided for review. Dose modulation, iterative reconstruction, and/or weight based adjustment of the mA/kV was utilized to reduce the radiation dose to as low as reasonably achievable.  COMPARISON: 06/13/2015 HISTORY: ORDERING SYSTEM PROVIDED HISTORY: ABD PAIN, FEVER, NO RECENT SURGERY TECHNOLOGIST aspiration TECHNOLOGIST PROVIDED HISTORY: Reason for exam:->vomit, altered mental status; r/o aspiration Ordering Physician Provided Reason for Exam: vomit, altered mental status; r/o aspiration Acuity: Acute Type of Exam: Initial FINDINGS: A single frontal view of the chest demonstrates no acute osseous abnormality. The heart size and mediastinal contours are stable and within normal limits. Curvilinear opacity within mid right lung is felt to represent a vascular shadow. There is mild curvilinear opacity within the medial right lung base, which could represent atelectasis, pneumonia, or aspiration. The left lung is clear. Curvilinear opacity within the medial right lung base, which could represent atelectasis, pneumonia, or aspiration. Xr Chest Portable    Result Date: 8/2/2018  EXAMINATION: SINGLE XRAY VIEW OF THE CHEST 8/2/2018 4:59 pm COMPARISON: 08/02/2018 HISTORY: ORDERING SYSTEM PROVIDED HISTORY: line placement TECHNOLOGIST PROVIDED HISTORY: Reason for exam:->line placement Ordering Physician Provided Reason for Exam: line placement Acuity: Acute Type of Exam: Initial FINDINGS: The right internal jugular catheter terminates in the mid SVC. There is bibasilar scarring and/or atelectasis. The cardiac silhouette is within normal limits. There is no pneumothorax or pleural effusion. Degenerative changes involve the right shoulder. 1. Right internal jugular catheter terminating in the mid SVC. Results for Donnie Lambert (MRN [id number]) as of 8/3/2018 11:47   Ref.  Range 8/3/2018 04:43   Sodium Latest Ref Range: 136 - 145 mmol/L 135 (L)   Potassium Latest Ref Range: 3.5 - 5.1 mmol/L 3.5   Chloride Latest Ref Range: 99 - 110 mmol/L 105   CO2 Latest Ref Range: 21 - 32 mmol/L 17 (L)   BUN Latest Ref Range: 7 - 20 mg/dL 24 (H)   Creatinine Latest Ref Range: 0.8 - 1.3 mg/dL 2.7 (H)   Anion Gap Latest Ref Range: 3 - 16  13   GFR Non- Latest Ref Range: >60  22 (A)   GFR  23. 5 (H)     Results for Briseida Godwin (MRN [id number]) as of 8/3/2018 11:47   Ref. Range 8/2/2018 11:37 8/2/2018 11:40 8/2/2018 11:40 8/2/2018 11:40 8/2/2018 14:15   URINE CULTURE Unknown     Rpt (A)   Culture, Blood 2 Unknown  See additional re. .. See additional re. .. Gram stain Aerobi. .. (A)    Organism Unknown  Pseudomonas aerug. .. (A) Escherichia coli . .. (A)  Gram negative humphrey (A)   Blood Culture, Routine Unknown Gram stain Aerobi. .. (A)       LEGIONELLA ANTIGEN, URINE Unknown     Rpt     CT abdomen/pelvis-  1. 4 mm obstructing nephrolithiasis within the right proximal ureter causing   moderate hydronephrosis. 2. Bilateral lower lobe pneumonia.  Recommend chest radiograph in 8 weeks to   confirm resolution. 3. Mildly dilated appendix without periappendiceal inflammation can be seen   in the setting of early acute appendicitis. 4. Unchanged 2.9 cm pancreatic cystic lesion. 5. Fecal impaction. Assessment:  Active Problems:    Sepsis ([address]    E coli bacteremia    Septic shock ([facility])    Pyuria    GONSALO (acute kidney injury) ([facility] [address]    Lactic acidosis    Elevated troponin    Open-angle glaucoma    Altered mental status    Bacteremia due to Pseudomonas    Pulmonary infiltrates    Nephrolithiasis    Hydronephrosis with urinary obstruction due to renal calculus    Fecal impaction [facility])    Pancreatic cyst  Resolved Problems:    * No resolved hospital problems.  *          Plan:   · Oxygen supplementation to keep saturation more than 90%  · Pulmonary toilet  · Monitor for any hypoventilation  · Patient was given bicarb push and bicarb drip was started  · NS was changed to Plasmalyte  · Nephrology consult requested-may not be a candidate for CRRT as he is on 3 pressors  · IV pressors(On Levophed/Epinephrine and Vasopressin) to keep MAP >65 mm Hg   · Patient was started on Cefepime and Cipro last night which needs to be continued-to be titrated further depending on final c/s and clinical status   ·

## 2018-08-04 ENCOUNTER — APPOINTMENT (OUTPATIENT)
Dept: GENERAL RADIOLOGY | Age: 83
DRG: 853 | End: 2018-08-04
Payer: COMMERCIAL

## 2018-08-04 LAB
A/G RATIO: 0.5 (ref 1.1–2.2)
ALBUMIN SERPL-MCNC: 1.8 G/DL (ref 3.4–5)
ALBUMIN SERPL-MCNC: 1.9 G/DL (ref 3.4–5)
ALP BLD-CCNC: 110 U/L (ref 40–129)
ALT SERPL-CCNC: 335 U/L (ref 10–40)
ANION GAP SERPL CALCULATED.3IONS-SCNC: 10 MMOL/L (ref 3–16)
ANION GAP SERPL CALCULATED.3IONS-SCNC: 11 MMOL/L (ref 3–16)
ANION GAP SERPL CALCULATED.3IONS-SCNC: 13 MMOL/L (ref 3–16)
ANION GAP SERPL CALCULATED.3IONS-SCNC: 15 MMOL/L (ref 3–16)
AST SERPL-CCNC: 290 U/L (ref 15–37)
BANDED NEUTROPHILS RELATIVE PERCENT: 42 % (ref 0–7)
BASE EXCESS ARTERIAL: 2 MMOL/L (ref -3–3)
BASOPHILS ABSOLUTE: 0 K/UL (ref 0–0.2)
BASOPHILS RELATIVE PERCENT: 0 %
BILIRUB SERPL-MCNC: 0.4 MG/DL (ref 0–1)
BUN BLDV-MCNC: 17 MG/DL (ref 7–20)
BUN BLDV-MCNC: 19 MG/DL (ref 7–20)
BUN BLDV-MCNC: 21 MG/DL (ref 7–20)
BUN BLDV-MCNC: 24 MG/DL (ref 7–20)
CALCIUM SERPL-MCNC: 6.6 MG/DL (ref 8.3–10.6)
CALCIUM SERPL-MCNC: 6.7 MG/DL (ref 8.3–10.6)
CALCIUM SERPL-MCNC: 6.8 MG/DL (ref 8.3–10.6)
CALCIUM SERPL-MCNC: 7.2 MG/DL (ref 8.3–10.6)
CARBOXYHEMOGLOBIN ARTERIAL: 0.3 % (ref 0–1.5)
CHLORIDE BLD-SCNC: 94 MMOL/L (ref 99–110)
CHLORIDE BLD-SCNC: 95 MMOL/L (ref 99–110)
CHLORIDE BLD-SCNC: 95 MMOL/L (ref 99–110)
CHLORIDE BLD-SCNC: 96 MMOL/L (ref 99–110)
CO2: 23 MMOL/L (ref 21–32)
CO2: 25 MMOL/L (ref 21–32)
CO2: 26 MMOL/L (ref 21–32)
CO2: 27 MMOL/L (ref 21–32)
CREAT SERPL-MCNC: 1.4 MG/DL (ref 0.8–1.3)
CREAT SERPL-MCNC: 1.5 MG/DL (ref 0.8–1.3)
CREAT SERPL-MCNC: 1.8 MG/DL (ref 0.8–1.3)
CREAT SERPL-MCNC: 2 MG/DL (ref 0.8–1.3)
EOSINOPHILS ABSOLUTE: 0 K/UL (ref 0–0.6)
EOSINOPHILS RELATIVE PERCENT: 0 %
GFR AFRICAN AMERICAN: 38
GFR AFRICAN AMERICAN: 43
GFR AFRICAN AMERICAN: 53
GFR AFRICAN AMERICAN: 58
GFR NON-AFRICAN AMERICAN: 32
GFR NON-AFRICAN AMERICAN: 36
GFR NON-AFRICAN AMERICAN: 44
GFR NON-AFRICAN AMERICAN: 48
GLOBULIN: 3.3 G/DL
GLUCOSE BLD-MCNC: 129 MG/DL (ref 70–99)
GLUCOSE BLD-MCNC: 164 MG/DL (ref 70–99)
GLUCOSE BLD-MCNC: 203 MG/DL (ref 70–99)
GLUCOSE BLD-MCNC: 203 MG/DL (ref 70–99)
HCO3 ARTERIAL: 25.2 MMOL/L (ref 21–29)
HCT VFR BLD CALC: 29.9 % (ref 40.5–52.5)
HEMOGLOBIN, ART, EXTENDED: 10.7 G/DL (ref 13.5–17.5)
HEMOGLOBIN: 10.3 G/DL (ref 13.5–17.5)
LACTIC ACID: 3.5 MMOL/L (ref 0.4–2)
LACTIC ACID: 3.8 MMOL/L (ref 0.4–2)
LACTIC ACID: 4.6 MMOL/L (ref 0.4–2)
LACTIC ACID: 5 MMOL/L (ref 0.4–2)
LYMPHOCYTES ABSOLUTE: 2.4 K/UL (ref 1–5.1)
LYMPHOCYTES RELATIVE PERCENT: 9 %
MCH RBC QN AUTO: 31.2 PG (ref 26–34)
MCHC RBC AUTO-ENTMCNC: 34.4 G/DL (ref 31–36)
MCV RBC AUTO: 90.8 FL (ref 80–100)
METHEMOGLOBIN ARTERIAL: 0.5 %
MONOCYTES ABSOLUTE: 0 K/UL (ref 0–1.3)
MONOCYTES RELATIVE PERCENT: 0 %
NEUTROPHILS ABSOLUTE: 24.8 K/UL (ref 1.7–7.7)
NEUTROPHILS RELATIVE PERCENT: 49 %
O2 CONTENT ARTERIAL: 15 ML/DL
O2 SAT, ARTERIAL: 96.7 %
O2 THERAPY: ABNORMAL
PCO2 ARTERIAL: 34.6 MMHG (ref 35–45)
PDW BLD-RTO: 13.9 % (ref 12.4–15.4)
PH ARTERIAL: 7.48 (ref 7.35–7.45)
PHOSPHORUS: 1.3 MG/DL (ref 2.5–4.9)
PHOSPHORUS: 1.5 MG/DL (ref 2.5–4.9)
PHOSPHORUS: 1.9 MG/DL (ref 2.5–4.9)
PHOSPHORUS: 2.9 MG/DL (ref 2.5–4.9)
PLATELET # BLD: 103 K/UL (ref 135–450)
PLATELET SLIDE REVIEW: ABNORMAL
PMV BLD AUTO: 8.9 FL (ref 5–10.5)
PO2 ARTERIAL: 84.4 MMHG (ref 75–108)
POTASSIUM SERPL-SCNC: 3 MMOL/L (ref 3.5–5.1)
POTASSIUM SERPL-SCNC: 3.1 MMOL/L (ref 3.5–5.1)
POTASSIUM SERPL-SCNC: 3.2 MMOL/L (ref 3.5–5.1)
POTASSIUM SERPL-SCNC: 3.5 MMOL/L (ref 3.5–5.1)
RBC # BLD: 3.29 M/UL (ref 4.2–5.9)
SLIDE REVIEW: ABNORMAL
SODIUM BLD-SCNC: 131 MMOL/L (ref 136–145)
SODIUM BLD-SCNC: 132 MMOL/L (ref 136–145)
SODIUM BLD-SCNC: 133 MMOL/L (ref 136–145)
SODIUM BLD-SCNC: 134 MMOL/L (ref 136–145)
TCO2 ARTERIAL: 26.3 MMOL/L
TOTAL PROTEIN: 5.1 G/DL (ref 6.4–8.2)
TOXIC GRANULATION: PRESENT
WBC # BLD: 27.2 K/UL (ref 4–11)

## 2018-08-04 PROCEDURE — 94640 AIRWAY INHALATION TREATMENT: CPT

## 2018-08-04 PROCEDURE — 6370000000 HC RX 637 (ALT 250 FOR IP): Performed by: FAMILY MEDICINE

## 2018-08-04 PROCEDURE — 0B9F8ZX DRAINAGE OF RIGHT LOWER LUNG LOBE, VIA NATURAL OR ARTIFICIAL OPENING ENDOSCOPIC, DIAGNOSTIC: ICD-10-PCS | Performed by: INTERNAL MEDICINE

## 2018-08-04 PROCEDURE — 80053 COMPREHEN METABOLIC PANEL: CPT

## 2018-08-04 PROCEDURE — 94003 VENT MGMT INPAT SUBQ DAY: CPT

## 2018-08-04 PROCEDURE — 2580000003 HC RX 258: Performed by: INTERNAL MEDICINE

## 2018-08-04 PROCEDURE — 6360000002 HC RX W HCPCS: Performed by: ANESTHESIOLOGY

## 2018-08-04 PROCEDURE — 6360000002 HC RX W HCPCS: Performed by: INTERNAL MEDICINE

## 2018-08-04 PROCEDURE — 0BC68ZZ EXTIRPATION OF MATTER FROM RIGHT LOWER LOBE BRONCHUS, VIA NATURAL OR ARTIFICIAL OPENING ENDOSCOPIC: ICD-10-PCS | Performed by: INTERNAL MEDICINE

## 2018-08-04 PROCEDURE — 94770 HC ETCO2 MONITOR DAILY: CPT

## 2018-08-04 PROCEDURE — 87040 BLOOD CULTURE FOR BACTERIA: CPT

## 2018-08-04 PROCEDURE — 87070 CULTURE OTHR SPECIMN AEROBIC: CPT

## 2018-08-04 PROCEDURE — 2500000003 HC RX 250 WO HCPCS: Performed by: INTERNAL MEDICINE

## 2018-08-04 PROCEDURE — 31645 BRNCHSC W/THER ASPIR 1ST: CPT | Performed by: INTERNAL MEDICINE

## 2018-08-04 PROCEDURE — 83605 ASSAY OF LACTIC ACID: CPT

## 2018-08-04 PROCEDURE — 94750 HC PULMONARY COMPLIANCE STUDY: CPT

## 2018-08-04 PROCEDURE — 82803 BLOOD GASES ANY COMBINATION: CPT

## 2018-08-04 PROCEDURE — 2000000000 HC ICU R&B

## 2018-08-04 PROCEDURE — 0BC88ZZ EXTIRPATION OF MATTER FROM LEFT UPPER LOBE BRONCHUS, VIA NATURAL OR ARTIFICIAL OPENING ENDOSCOPIC: ICD-10-PCS | Performed by: INTERNAL MEDICINE

## 2018-08-04 PROCEDURE — 84100 ASSAY OF PHOSPHORUS: CPT

## 2018-08-04 PROCEDURE — 6370000000 HC RX 637 (ALT 250 FOR IP): Performed by: INTERNAL MEDICINE

## 2018-08-04 PROCEDURE — 0BCB8ZZ EXTIRPATION OF MATTER FROM LEFT LOWER LOBE BRONCHUS, VIA NATURAL OR ARTIFICIAL OPENING ENDOSCOPIC: ICD-10-PCS | Performed by: INTERNAL MEDICINE

## 2018-08-04 PROCEDURE — 36415 COLL VENOUS BLD VENIPUNCTURE: CPT

## 2018-08-04 PROCEDURE — 85025 COMPLETE CBC W/AUTO DIFF WBC: CPT

## 2018-08-04 PROCEDURE — 99291 CRITICAL CARE FIRST HOUR: CPT | Performed by: INTERNAL MEDICINE

## 2018-08-04 PROCEDURE — 2700000000 HC OXYGEN THERAPY PER DAY

## 2018-08-04 PROCEDURE — 3609010800 HC BRONCHOSCOPY ALVEOLAR LAVAGE: Performed by: INTERNAL MEDICINE

## 2018-08-04 PROCEDURE — 31624 DX BRONCHOSCOPE/LAVAGE: CPT | Performed by: INTERNAL MEDICINE

## 2018-08-04 PROCEDURE — 99223 1ST HOSP IP/OBS HIGH 75: CPT | Performed by: SURGERY

## 2018-08-04 PROCEDURE — 5A1945Z RESPIRATORY VENTILATION, 24-96 CONSECUTIVE HOURS: ICD-10-PCS | Performed by: INTERNAL MEDICINE

## 2018-08-04 PROCEDURE — 2709999900 HC NON-CHARGEABLE SUPPLY: Performed by: INTERNAL MEDICINE

## 2018-08-04 PROCEDURE — 87205 SMEAR GRAM STAIN: CPT

## 2018-08-04 PROCEDURE — 94761 N-INVAS EAR/PLS OXIMETRY MLT: CPT

## 2018-08-04 PROCEDURE — 71045 X-RAY EXAM CHEST 1 VIEW: CPT

## 2018-08-04 PROCEDURE — 2580000003 HC RX 258: Performed by: FAMILY MEDICINE

## 2018-08-04 PROCEDURE — 6370000000 HC RX 637 (ALT 250 FOR IP)

## 2018-08-04 PROCEDURE — 0BC58ZZ EXTIRPATION OF MATTER FROM RIGHT MIDDLE LOBE BRONCHUS, VIA NATURAL OR ARTIFICIAL OPENING ENDOSCOPIC: ICD-10-PCS | Performed by: INTERNAL MEDICINE

## 2018-08-04 RX ORDER — POTASSIUM CHLORIDE 7.45 MG/ML
10 INJECTION INTRAVENOUS PRN
Status: DISCONTINUED | OUTPATIENT
Start: 2018-08-04 | End: 2018-08-07

## 2018-08-04 RX ORDER — ACETYLCYSTEINE 200 MG/ML
SOLUTION ORAL; RESPIRATORY (INHALATION) PRN
Status: DISCONTINUED | OUTPATIENT
Start: 2018-08-04 | End: 2018-08-04 | Stop reason: HOSPADM

## 2018-08-04 RX ORDER — LIDOCAINE HYDROCHLORIDE 20 MG/ML
INJECTION, SOLUTION INFILTRATION; PERINEURAL PRN
Status: DISCONTINUED | OUTPATIENT
Start: 2018-08-04 | End: 2018-08-04 | Stop reason: HOSPADM

## 2018-08-04 RX ORDER — MAGNESIUM HYDROXIDE 1200 MG/15ML
LIQUID ORAL CONTINUOUS PRN
Status: COMPLETED | OUTPATIENT
Start: 2018-08-04 | End: 2018-08-04

## 2018-08-04 RX ORDER — IPRATROPIUM BROMIDE AND ALBUTEROL SULFATE 2.5; .5 MG/3ML; MG/3ML
1 SOLUTION RESPIRATORY (INHALATION) 4 TIMES DAILY
Status: DISCONTINUED | OUTPATIENT
Start: 2018-08-04 | End: 2018-08-04

## 2018-08-04 RX ORDER — IPRATROPIUM BROMIDE AND ALBUTEROL SULFATE 2.5; .5 MG/3ML; MG/3ML
1 SOLUTION RESPIRATORY (INHALATION)
Status: DISCONTINUED | OUTPATIENT
Start: 2018-08-05 | End: 2018-08-12

## 2018-08-04 RX ADMIN — DORZOLAMIDE HYDROCHLORIDE AND TIMOLOL MALEATE 1 DROP: 20; 5 SOLUTION/ DROPS OPHTHALMIC at 22:23

## 2018-08-04 RX ADMIN — MEROPENEM 500 MG: 500 INJECTION, POWDER, FOR SOLUTION INTRAVENOUS at 04:51

## 2018-08-04 RX ADMIN — POTASSIUM CHLORIDE 10 MEQ: 10 INJECTION, SOLUTION INTRAVENOUS at 02:52

## 2018-08-04 RX ADMIN — LATANOPROST 1 DROP: 50 SOLUTION OPHTHALMIC at 22:24

## 2018-08-04 RX ADMIN — POTASSIUM CHLORIDE 10 MEQ: 10 INJECTION, SOLUTION INTRAVENOUS at 06:31

## 2018-08-04 RX ADMIN — IPRATROPIUM BROMIDE AND ALBUTEROL SULFATE 1 AMPULE: .5; 3 SOLUTION RESPIRATORY (INHALATION) at 21:54

## 2018-08-04 RX ADMIN — SODIUM CHLORIDE, SODIUM GLUCONATE, SODIUM ACETATE, POTASSIUM CHLORIDE AND MAGNESIUM CHLORIDE 1000 ML: 526; 502; 368; 37; 30 INJECTION, SOLUTION INTRAVENOUS at 18:18

## 2018-08-04 RX ADMIN — SODIUM BICARBONATE: 84 INJECTION, SOLUTION INTRAVENOUS at 18:39

## 2018-08-04 RX ADMIN — EPINEPHRINE 6 MCG/MIN: 1 INJECTION PARENTERAL at 08:59

## 2018-08-04 RX ADMIN — SODIUM CHLORIDE, SODIUM GLUCONATE, SODIUM ACETATE, POTASSIUM CHLORIDE AND MAGNESIUM CHLORIDE 1000 ML: 526; 502; 368; 37; 30 INJECTION, SOLUTION INTRAVENOUS at 02:02

## 2018-08-04 RX ADMIN — Medication 1 CAPSULE: at 09:04

## 2018-08-04 RX ADMIN — DOCUSATE SODIUM 100 MG: 100 CAPSULE, LIQUID FILLED ORAL at 09:02

## 2018-08-04 RX ADMIN — SODIUM CHLORIDE, PRESERVATIVE FREE 10 ML: 5 INJECTION INTRAVENOUS at 22:21

## 2018-08-04 RX ADMIN — APIXABAN 5 MG: 5 TABLET, FILM COATED ORAL at 09:03

## 2018-08-04 RX ADMIN — Medication: at 05:02

## 2018-08-04 RX ADMIN — POTASSIUM CHLORIDE 10 MEQ: 10 INJECTION, SOLUTION INTRAVENOUS at 05:23

## 2018-08-04 RX ADMIN — POTASSIUM CHLORIDE 10 MEQ: 10 INJECTION, SOLUTION INTRAVENOUS at 18:58

## 2018-08-04 RX ADMIN — SODIUM CHLORIDE, SODIUM GLUCONATE, SODIUM ACETATE, POTASSIUM CHLORIDE AND MAGNESIUM CHLORIDE 1000 ML: 526; 502; 368; 37; 30 INJECTION, SOLUTION INTRAVENOUS at 09:48

## 2018-08-04 RX ADMIN — PROPOFOL 25 MCG/KG/MIN: 10 INJECTION, EMULSION INTRAVENOUS at 12:04

## 2018-08-04 RX ADMIN — OXYCODONE HYDROCHLORIDE AND ACETAMINOPHEN 500 MG: 500 TABLET ORAL at 09:03

## 2018-08-04 RX ADMIN — POTASSIUM CHLORIDE 10 MEQ: 10 INJECTION, SOLUTION INTRAVENOUS at 01:53

## 2018-08-04 RX ADMIN — PANTOPRAZOLE SODIUM 40 MG: 40 TABLET, DELAYED RELEASE ORAL at 09:04

## 2018-08-04 RX ADMIN — PROPOFOL 30 MCG/KG/MIN: 10 INJECTION, EMULSION INTRAVENOUS at 04:55

## 2018-08-04 RX ADMIN — SENNOSIDES 8.6 MG: 8.6 TABLET, FILM COATED ORAL at 09:03

## 2018-08-04 RX ADMIN — BRIMONIDINE TARTRATE 1 DROP: 2 SOLUTION OPHTHALMIC at 09:03

## 2018-08-04 RX ADMIN — PROPOFOL 25 MCG/KG/MIN: 10 INJECTION, EMULSION INTRAVENOUS at 21:33

## 2018-08-04 RX ADMIN — FERROUS SULFATE TAB 325 MG (65 MG ELEMENTAL FE) 325 MG: 325 (65 FE) TAB at 09:03

## 2018-08-04 RX ADMIN — DORZOLAMIDE HYDROCHLORIDE AND TIMOLOL MALEATE 1 DROP: 20; 5 SOLUTION/ DROPS OPHTHALMIC at 09:03

## 2018-08-04 RX ADMIN — BRIMONIDINE TARTRATE 1 DROP: 2 SOLUTION OPHTHALMIC at 22:23

## 2018-08-04 RX ADMIN — Medication 1 CAPSULE: at 17:07

## 2018-08-04 RX ADMIN — POTASSIUM CHLORIDE 10 MEQ: 10 INJECTION, SOLUTION INTRAVENOUS at 10:56

## 2018-08-04 RX ADMIN — MEROPENEM 500 MG: 500 INJECTION, POWDER, FOR SOLUTION INTRAVENOUS at 17:07

## 2018-08-04 RX ADMIN — POTASSIUM CHLORIDE 10 MEQ: 10 INJECTION, SOLUTION INTRAVENOUS at 23:29

## 2018-08-04 RX ADMIN — SODIUM CHLORIDE, PRESERVATIVE FREE 10 ML: 5 INJECTION INTRAVENOUS at 09:04

## 2018-08-04 RX ADMIN — SENNOSIDES 8.6 MG: 8.6 TABLET, FILM COATED ORAL at 22:21

## 2018-08-04 RX ADMIN — DOCUSATE SODIUM 100 MG: 100 CAPSULE, LIQUID FILLED ORAL at 22:21

## 2018-08-04 RX ADMIN — APIXABAN 5 MG: 5 TABLET, FILM COATED ORAL at 22:20

## 2018-08-04 RX ADMIN — POTASSIUM CHLORIDE 10 MEQ: 10 INJECTION, SOLUTION INTRAVENOUS at 00:49

## 2018-08-04 RX ADMIN — VASOPRESSIN 0.04 UNITS/MIN: 20 INJECTION INTRAVENOUS at 05:59

## 2018-08-04 RX ADMIN — POTASSIUM CHLORIDE 10 MEQ: 10 INJECTION, SOLUTION INTRAVENOUS at 22:18

## 2018-08-04 RX ADMIN — SODIUM BICARBONATE: 84 INJECTION, SOLUTION INTRAVENOUS at 02:50

## 2018-08-04 RX ADMIN — POTASSIUM CHLORIDE 10 MEQ: 10 INJECTION, SOLUTION INTRAVENOUS at 08:36

## 2018-08-04 RX ADMIN — POTASSIUM CHLORIDE 10 MEQ: 10 INJECTION, SOLUTION INTRAVENOUS at 12:05

## 2018-08-04 ASSESSMENT — PULMONARY FUNCTION TESTS
PIF_VALUE: 25
PIF_VALUE: 21
PIF_VALUE: 21
PIF_VALUE: 24
PIF_VALUE: 26
PIF_VALUE: 25
PIF_VALUE: 24

## 2018-08-04 NOTE — PROGRESS NOTES
4 Eyes Skin Assessment     The patient is being assess for   Shift Handoff    I agree that 2 RN's have performed a thorough Head to Toe Skin Assessment on the patient. ALL assessment sites listed below have been assessed. Areas assessed by both nurses:   [x]   Head, Face, and Ears   [x]   Shoulders, Back, and Chest, Abdomen  [x]   Arms, Elbows, and Hands   [x]   Coccyx, Sacrum, and Ischium  [x]   Legs, Feet, and Heels        Suprapubic rao    **SHARE this note so that the co-signing nurse is able to place an eSignature**    Co-signer eSignature: {[email or web address]:[id number]}    Does the Patient have Skin Breakdown?   No          Naldo Prevention initiated:  Yes   Wound Care Orders initiated:  No      [facility] nurse consulted for Pressure Injury (Stage 3,4, Unstageable, DTI, NWPT, Complex wounds)and New or Established Ostomies:  NA      Primary Nurse eSignature: Electronically signed by Chet Owen RN on 8/4/18 at 7:41 PM

## 2018-08-04 NOTE — PROGRESS NOTES
Lab called to report critical lab values- Lactic 5.0, was 4.7 at 1800 today, Potassium 3.0, was 3.5 at 1800 today. Hospitalist paged, waiting return call.  Thompson Low

## 2018-08-04 NOTE — PROGRESS NOTES
Impression   1. Bibasilar atelectasis and/or pneumonia. 2. The enteric tube is in good position in the stomach. OG in place and okay to use.     Martha Hickey, ABRAHAN

## 2018-08-04 NOTE — PROGRESS NOTES
08/03/18 2045   Vent Information   Vent Type 840   Vent Mode AC/VC   Vt Ordered 500 mL   Rate Set 14 bmp   Peak Flow 40 L/min   Pressure Support 0 cmH20   FiO2  30 %   Sensitivity 3   PEEP/CPAP 5   Cuff Pressure (cm H2O) 30 cm H2O   Humidification Source HME   Vent Patient Data   Vt Exhaled 446 mL   Rate Measured 14 br/min   Minute Volume 6.26 Liters   Peak Inspiratory Pressure 22 cmH2O   I:E Ratio 1:2.20   Mean Airway Pressure 11 cmH20   Plateau Pressure 21 UEO69   Static Compliance 28 mL/cmH2O   Dynamic Compliance 26.4 mL/cmH2O   Spontaneous Breathing Trial (SBT) RT Doc   Pulse 74   SpO2 97 %   Cough/Sputum   Sputum How Obtained Endotracheal   Cough Non-productive   Breath Sounds   Right Upper Lobe Clear   Right Middle Lobe Diminished   Right Lower Lobe Diminished   Left Upper Lobe Clear   Left Lower Lobe Diminished   Additional Respiratory  Assessments   Resp 14   End Tidal CO2 28 (%)   Position Semi-Correa's   Alarm Settings   High Pressure Alarm 45 cmH2O   Low Minute Volume Alarm 2 L/min   High Respiratory Rate 40 br/min   Low Exhaled Vt  200 mL   ETT (adult)   Placement Date/Time: 08/03/18 1305   Preoxygenation: Yes  Mask Ventilation: Mask ventilation not attempted (0)  Technique: Direct laryngoscopy;Video laryngoscopy  Type: Cuffed  Tube Size: 7 mm  Laryngoscope: Mac  Blade Size: 4  Location: Oral  Grade V...    Secured at 22 cm   Measured From [address] [place] [address],[address] By Commercial tube boyd   Site Condition Dry

## 2018-08-04 NOTE — PROGRESS NOTES
Four eyes skin assessment completed with previous nurse during bedside report. Shift assessment complete, see flow sheet. VSS. Lines checked and verified, alarms on and audible. Repositioned patient, sacral Mepilex in place. Will continue to monitor.     MAR verified:   EPINEPHrine infusion 30 mcg/min (08/03/18 1757)    sodium bicarbonate infusion 100 mL/hr at 08/03/18 1458    electrolyte-A 1,000 mL (08/03/18 1757)    propofol 30 mcg/kg/min (08/03/18 2036)    sodium chloride      norepinephrine 20 mcg/min (08/03/18 2337)    vasopressin infusion 0.04 Units/min (08/03/18 2237)       Electronically signed by Yanely Blake RN

## 2018-08-04 NOTE — PROGRESS NOTES
Calcium 6.8  Phosphorus 1.9  Glucose 203  Albumin 1.8        WBC 27.2  Lactic acid 4.6  Potassium 3.2 (replacements currently infusing)    pH, Arterial Latest Ref Range: 7.350 - 7.450  7.481 (H)   pCO2, Arterial Latest Ref Range: 35.0 - 45.0 mmHg 34.6 (L)   pO2, Arterial Latest Ref Range: 75.0 - 108.0 mmHg 84.4   HCO3, Arterial Latest Ref Range: 21.0 - 29.0 mmol/L 25.2     Tamiko Blackwell RN

## 2018-08-04 NOTE — PROGRESS NOTES
08/04/18 1203   Vent Information   Vent Type 840   Vent Mode AC/VC   Vt Ordered 500 mL   Rate Set 14 bmp   Peak Flow 40 L/min   Pressure Support 0 cmH20   FiO2  30 %   Sensitivity 3   PEEP/CPAP 5   Cuff Pressure (cm H2O) 28 cm H2O   Humidification Source HME   Vent Patient Data   Vt Exhaled 439 mL   Rate Measured 14 br/min   Minute Volume 6.14 Liters   Peak Inspiratory Pressure 24 cmH2O   I:E Ratio 1:2.20   Mean Airway Pressure 12 cmH20   Plateau Pressure 24 ERH58   Static Compliance 24 mL/cmH2O   Dynamic Compliance 23 mL/cmH2O   Spontaneous Breathing Trial (SBT) RT Doc   Pulse 66   SpO2 98 %   Cough/Sputum   Sputum How Obtained Endotracheal   Cough Non-productive   Breath Sounds   Right Upper Lobe Clear   Right Middle Lobe Diminished   Right Lower Lobe Diminished   Left Upper Lobe Clear   Left Lower Lobe Diminished   Additional Respiratory  Assessments   Resp 14   End Tidal CO2 28 (%)   Position Semi-Correa's   Alarm Settings   High Pressure Alarm 45 cmH2O   Low Minute Volume Alarm 2 L/min   High Respiratory Rate 40 br/min   Low Exhaled Vt  200 mL   Patient Observation   Observations ambu bedside, hme changed   ETT (adult)   Placement Date/Time: 08/03/18 1305   Preoxygenation: Yes  Mask Ventilation: Mask ventilation not attempted (0)  Technique: Direct laryngoscopy;Video laryngoscopy  Type: Cuffed  Tube Size: 7 mm  Laryngoscope: Mac  Blade Size: 4  Location: Oral  Grade V...    Secured at 22 cm   Measured From Lips   ET Placement Left   Secured By Commercial tube boyd   Site Condition Dry

## 2018-08-04 NOTE — PROGRESS NOTES
Latest Ref Range: 0.0 - 1.3 K/uL 0.0   Eosinophils # Latest Ref Range: 0.0 - 0.6 K/uL 0.0   Basophils # Latest Ref Range: 0.0 - 0.2 K/uL 0.0   Bands Relative Latest Ref Range: 0 - 7 % 42 (H)   Toxic Granulation Unknown Present (A)   SLIDE REVIEW Unknown see below   PLATELET SLIDE REVIEW Unknown Decreased   O2 Therapy Unknown Unknown   Hemoglobin, Art, Extended Latest Ref Range: 13.5 - 17.5 g/dL 10.7 (L)   pH, Arterial Latest Ref Range: 7.350 - 7.450  7.481 (H)   pCO2, Arterial Latest Ref Range: 35.0 - 45.0 mmHg 34.6 (L)   pO2, Arterial Latest Ref Range: 75.0 - 108.0 mmHg 84.4   HCO3, Arterial Latest Ref Range: 21.0 - 29.0 mmol/L 25.2   TCO2 (calc), Art Latest Ref Range: Not Established mmol/L 26.3   Base Excess, Arterial Latest Ref Range: -3.0 - 3.0 mmol/L 2.0   O2 Sat, Arterial Latest Ref Range: >92 % 96.7   O2 Content, Arterial Latest Ref Range: Not Established mL/dL 15   Methemoglobin, Arterial Latest Ref Range: <1.5 % 0.5   Carboxyhgb, Arterial Latest Ref Range: 0.0 - 1.5 % 0.3

## 2018-08-04 NOTE — PROGRESS NOTES
08/04/18 0807   Vent Information   Vent Type 840   Vent Mode AC/VC   Vt Ordered 500 mL   Rate Set 14 bmp   Peak Flow 40 L/min   Pressure Support 0 cmH20   FiO2  30 %   Sensitivity 3   PEEP/CPAP 5   Cuff Pressure (cm H2O) 28 cm H2O   Humidification Source HME   Vent Patient Data   Vt Exhaled 440 mL   Rate Measured 14 br/min   Minute Volume 6.16 Liters   Peak Inspiratory Pressure 24 cmH2O   I:E Ratio 1:2.20   Mean Airway Pressure 12 cmH20   Plateau Pressure 23 FAC69   Static Compliance 32 mL/cmH2O   Dynamic Compliance 16 mL/cmH2O   Spontaneous Breathing Trial (SBT) RT Doc   Pulse 66   SpO2 98 %   Cough/Sputum   Sputum How Obtained Endotracheal   Cough Non-productive   Breath Sounds   Right Upper Lobe Clear   Right Middle Lobe Diminished   Right Lower Lobe Diminished   Left Upper Lobe Clear   Left Lower Lobe Diminished   Additional Respiratory  Assessments   Resp 14   End Tidal CO2 27 (%)   Position Semi-Correa's   Alarm Settings   High Pressure Alarm 45 cmH2O   Low Minute Volume Alarm 2 L/min   High Respiratory Rate 40 br/min   Low Exhaled Vt  200 mL   Patient Observation   Observations ambu bedside   ETT (adult)   Placement Date/Time: 08/03/18 1305   Preoxygenation: Yes  Mask Ventilation: Mask ventilation not attempted (0)  Technique: Direct laryngoscopy;Video laryngoscopy  Type: Cuffed  Tube Size: 7 mm  Laryngoscope: Mac  Blade Size: 4  Location: Oral  Grade V...    Secured at 22 cm   Measured From Lips   ET Placement Left   Secured By Commercial tube boyd   Site Condition Dry

## 2018-08-04 NOTE — PROGRESS NOTES
Hospitalist returned call & order noted for potassium replacement and aware of lactic level & IVF at 125 hr. Allie Greenwood

## 2018-08-04 NOTE — CONSULTS
[facility] SURGERY      Department of General Surgery Consult    PATIENT NAME: Brittnee Mady OF BIRTH: 7/1/1928    ADMISSION DATE: 8/2/2018 11:01 AM      TODAY'S DATE: 8/4/2018    Reason for Consult:  Abnormal CT    Requesting Physician:  Darshana Larsen    HISTORY OF PRESENT ILLNESS:              The patient is a 80 y.o. male who presented with altered mental status and lethargy with associated nausea. He has been critically ill with suspected urinary source of sepsis. He had CT that raised question about the patient's appendix. I am asked to evaluate that.       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     Dementia     Dysarthria     Dysphagia     GERD (gastroesophageal reflux disease)     Glaucoma     Hyperlipidemia     Hypertension     Obstructive and reflux uropathy     Stricture of ureter     Suprapubic catheter ([address]     Urinary retention     UTI (urinary tract infection)        Past Surgical History:        Procedure Laterality Date    [place] CYSTOSCOPY,INSERT URETERAL STENT Right 8/3/2018    CYSTOSCOPY URETERAL STENT INSERTION performed by Albin Johnson MD at [facility] OR       Current Medications:   Current Facility-Administered Medications: potassium chloride 10 mEq/100 mL IVPB (Peripheral Line), 10 mEq, Intravenous, PRN  EPINEPHrine (EPINEPHrine HCL) 5 mg in dextrose 5 % 250 mL infusion, 1 mcg/min, Intravenous, Continuous  sodium bicarbonate 150 mEq in dextrose 5 % 1,000 mL infusion, , Intravenous, Continuous  electrolyte-A (PLASMALYTE-A) solution 1,000 mL, 1,000 mL, Intravenous, Continuous  propofol 1000 MG/100ML injection, 10 mcg/kg/min, Intravenous, Titrated  meropenem (MERREM) 500 mg IVPB minibag, 500 mg, Intravenous, Q12H  sodium chloride 0.9 Provider, MD   furosemide (LASIX) 20 MG tablet Take 20 mg by mouth daily    Historical Provider, MD   tamsulosin (FLOMAX) 0.4 MG capsule Take 0.4 mg by mouth nightly    Historical Provider, MD   potassium chloride SA (K-DUR;KLOR-CON M) 20 MEQ tablet Take 20 mEq by mouth 2 times daily    Historical Provider, MD   docusate sodium (COLACE) 100 MG capsule Take 100 mg by mouth 2 times daily    Historical Provider, MD   senna (SENOKOT) 8.6 MG tablet Take 1 tablet by mouth 2 times daily    Historical Provider, MD   hydrALAZINE (APRESOLINE) 25 MG tablet Take 25 mg by mouth 3 times daily    Historical Provider, MD   dorzolamide-timolol (COSOPT) 22.3-6.8 MG/ML ophthalmic solution 1 drop 2 times daily    Historical Provider, MD   amLODIPine (NORVASC) 5 MG tablet Take 5 mg by mouth daily Hold if SBP <110    Historical Provider, MD   bisacodyl (BISAC-EVAC) 10 MG suppository Place 10 mg rectally daily as needed for Constipation    Historical Provider, MD   Lactobacillus (ACIDOPHILUS) CAPS capsule Take 1 capsule by mouth 2 times daily    Historical Provider, MD   Multiple Vitamins-Minerals (STRESS FORMULA 500/ZINC) TABS Take 1 tablet by mouth daily    Historical Provider, MD   menthol-zinc oxide (RISAMINE) 0.44-20.625 % OINT ointment Apply topically daily Max 30 ml per day. Historical Provider, MD   silver sulfADIAZINE (SILVADENE) 1 % cream Apply topically daily    Historical Provider, MD   ascorbic acid (VITAMIN C) 500 MG tablet Take 500 mg by mouth daily    Historical Provider, MD   acetaminophen (TYLENOL) 500 MG tablet Take 500 mg by mouth every 6 hours as needed for Pain    Historical Provider, MD        Allergies:  Patient has no known allergies. Social History:   TOBACCO:   reports that he has never smoked. He has never used smokeless tobacco.  ETOH:   reports that he does not drink alcohol. DRUGS:   reports that he does not use drugs. Family History:    History reviewed. No pertinent family history.     REVIEW OF SYSTEMS:  Not obtainable due to patient's condition. PHYSICAL EXAM:  VITALS:  /72   Pulse 65   Temp 97.5 °F (36.4 °C) (Axillary)   Resp 14   Ht 5' 9\" (1.753 m)   Wt 177 lb 14.6 oz (80.7 kg)   SpO2 99%   BMI 26.27 kg/m²     CONSTITUTIONAL:  Intubated, sedated, no apparent distress and normal weight  EYES:  sclera clear  ENT:  normocepalic, without obvious abnormality  NECK:  supple, symmetrical, trachea midline  LUNGS:  clear to auscultation  CARDIOVASCULAR:  irregularly irregular rhythm   ABDOMEN:   non-distended, non-tender but limited exam due to sedation,  and suprapubic in place  MUSCULOSKELETAL:  pitting edema lower extremities present  NEUROLOGIC:  Mental Status Exam:  Level of Alertness:  Sedated  SKIN:  no rashes    DATA:    CBC:   Recent Labs      08/03/18   0443  08/03/18   1806  08/04/18   0530   WBC  24.2*  31.8*  27.2*   HGB  10.7*  10.5*  10.3*   HCT  32.9*  30.9*  29.9*   PLT  155  130*  103*     BMP:    Recent Labs      08/03/18   1806  08/03/18   2336  08/04/18   0530   NA  133*  133*  134*   K  3.5  3.0*  3.2*   CL  100  95*  96*   CO2  20*  23  25   BUN  25*  24*  21*   CREATININE  2.3*  2.0*  1.8*   GLUCOSE  177*  203*  203*     Hepatic:   Recent Labs      08/02/18   1137  08/04/18   0530   AST  49*  290*   ALT  30  335*   BILITOT  0.8  0.4   ALKPHOS  192*  110       Radiology Review:  CT with appendix that is 1 cm. No periappendiceal inflammation. ASSESSMENT:  E coli and Pseudomonas bacteremia  Septic shock requiring maximum pressors  GONSALO  UTI  Incidental finding dilated appendix on CT    PLAN:  The clinical picture fits for urinary source of sepsis. Patient is improving after ureteral stenting yesterday. CT finding does not represent appendicitis, and patient not candidate for appendix surgery given clinical condition. Continue antibiotics and current treatments. No general surgical plans.         [id number]

## 2018-08-04 NOTE — PROGRESS NOTES
08/04/18 0352   Vent Information   Vent Type 840   Vent Mode AC/VC   Vt Ordered 500 mL   Rate Set 14 bmp   Peak Flow 40 L/min   Pressure Support 0 cmH20   FiO2  30 %   Sensitivity 3   PEEP/CPAP 5   Cuff Pressure (cm H2O) 28 cm H2O   Humidification Source HME  (changed)   Vent Patient Data   Vt Exhaled 442 mL   Rate Measured 14 br/min   Minute Volume 6.19 Liters   Peak Inspiratory Pressure 21 cmH2O   I:E Ratio 1:2.20   Mean Airway Pressure 11 cmH20   Spontaneous Breathing Trial (SBT) RT Doc   Pulse 65   SpO2 97 %   Cough/Sputum   Sputum How Obtained Endotracheal   Cough Productive   Sputum Amount Small   Sputum Color Dark Yellow   Tenacity Thick   Breath Sounds   Right Upper Lobe Clear   Right Middle Lobe Diminished   Right Lower Lobe Diminished   Left Upper Lobe Clear   Left Lower Lobe Diminished   Additional Respiratory  Assessments   Resp 12   End Tidal CO2 27 (%)   Alarm Settings   High Pressure Alarm 45 cmH2O   Low Minute Volume Alarm 2 L/min   High Respiratory Rate 40 br/min   Low Exhaled Vt  200 mL   ETT (adult)   Placement Date/Time: 08/03/18 1305   Preoxygenation: Yes  Mask Ventilation: Mask ventilation not attempted (0)  Technique: Direct laryngoscopy;Video laryngoscopy  Type: Cuffed  Tube Size: 7 mm  Laryngoscope: Mac  Blade Size: 4  Location: Oral  Grade V...    Secured at 22 cm   Measured From Lips   ET Placement Left   Secured By Commercial tube boyd   Site Condition Dry

## 2018-08-04 NOTE — PROGRESS NOTES
Hospitalist Progress Note  8/4/2018 12:43 PM  Subjective:   Admit Date: 8/2/2018  PCP: Ace Black Status: Inpatient    Interval History: Hospital Day: 3, admitted with septic shock, high grade fever, acute kidney injury (KDIGO stage 3), acute encephalopathy now on three pressors and mechanical ventilation 30% FiO2 AC/VC. CT abd showed obstructing stone. POD # 1 cystoscopy and right ureteral stent (Dr. Delta Garcia) to relieve obstruction of right kidney. Blood cultures are positive for E coli and Pseudomonas, UC with GNR. Prior history of UTI with bacteremia, E coli sensitive to cefepime. Renal function slightly improved. Code status has been changed to [facility] [place] arrest due to poor overall prognosis and transition to palliative care. Family is waiting until Monday to consider terminal vent wean. DIET DYSPHAGIA I PUREED; Nectar Thick;  No Drinking Straw  Medications:     EPINEPHrine infusion 6 mcg/min (08/04/18 0929)   sodium bicarbonate infusion 100 mL/hr at 08/04/18 0250   electrolyte-A 1,000 mL (08/04/18 0948)   propofol 25 mcg/kg/min (08/04/18 1204)   norepinephrine 10 mcg/min (08/04/18 0934)   vasopressin infusion 0.04 Units/min (08/04/18 0559)     meropenem  500 mg Intravenous Q12H   apixaban  5 mg Oral BID   ascorbic acid  500 mg Oral Daily   brimonidine  1 drop Both Eyes BID   docusate sodium  100 mg Oral BID   dorzolamide-timolol  1 drop Both Eyes BID   ferrous sulfate  325 mg Oral Daily with breakfast   latanoprost  1 drop Both Eyes Nightly   pantoprazole  40 mg Oral QAM AC   senna  1 tablet Oral BID   lactobacillus  1 capsule Oral BID WC     Recent Labs      08/03/18   0443  08/03/18   1806  08/04/18   0530   WBC  24.2*  31.8*  27.2*   HGB  10.7*  10.5*  10.3*   PLT  155  130*  103*   MCV  93.4  91.7  90.8     Recent Labs      08/03/18   1806  08/03/18   2336  08/04/18   0530   NA  133*  133*  134*   K  3.5  3.0*  3.2*   CL  100  95*  96*   CO2  20*  23  25   BUN  25*  24*  21*   CREATININE  2.3*  2.0* resolving. No need for CRRT at this time per nephrology (Dr. Sherri Mckeon). IV fluids and hemodynamic support. 6. Open angle glaucoma continues on brimonidine, dorzolamide-timolol, and latanoprost eye drops to avoid rebound increase in intraocular pressure. 7. Stress ulcer prophylaxis with pantoprazole (Protonix). 8. Iron deficiency anemia continues on ferrous sulfate and ascorbic acid. 9. Hypokalemia and hypomagnesemia:  Replace with IV fluids as needed. Advance Directive: DNR-CCA with family considering palliative care on Monday  DVT prophylaxis with enoxaparin 40 mg sub-Q daily. Discharge planning: Prognosis is poor, escalating palliative care.         Montez Jeffery MD  [facility]

## 2018-08-04 NOTE — PROGRESS NOTES
Patient appears sensitive to epinephrine titrations. Decreased epi from 6 mcg/mi to 5 mcg/min - jay jay pressures dropped to (MAP-61); levo 4 mcg/min increased to 6 mcg/min.     Will monitor     Mirlande Morris RN

## 2018-08-04 NOTE — PROGRESS NOTES
Results for Mendel Dailey (MRN [id number]) as of 8/4/2018 13:06   Ref.  Range 8/4/2018 11:58   Sodium Latest Ref Range: 136 - 145 mmol/L 131 (L)   Potassium Latest Ref Range: 3.5 - 5.1 mmol/L 3.5   Chloride Latest Ref Range: 99 - 110 mmol/L 94 (L)   CO2 Latest Ref Range: 21 - 32 mmol/L 26   BUN Latest Ref Range: 7 - 20 mg/dL 19   Creatinine Latest Ref Range: 0.8 - 1.3 mg/dL 1.5 (H)   Anion Gap Latest Ref Range: 3 - 16  11   GFR Non- Latest Ref Range: >60  44 (A)   GFR  Latest Ref Range: >60  53 (A)   Lactic Acid Latest Ref Range: 0.4 - 2.0 mmol/L 3.8 (H)   Glucose Latest Ref Range: 70 - 99 mg/dL 164 (H)   Calcium Latest Ref Range: 8.3 - 10.6 mg/dL 6.7 (L)   Phosphorus Latest Ref Range: 2.5 - 4.9 mg/dL 1.5 (L)   Albumin Latest Ref Range: 3.4 - 5.0 g/dL 1.9 (L)

## 2018-08-05 ENCOUNTER — APPOINTMENT (OUTPATIENT)
Dept: GENERAL RADIOLOGY | Age: 83
DRG: 853 | End: 2018-08-05
Payer: COMMERCIAL

## 2018-08-05 LAB
ALBUMIN SERPL-MCNC: 1.8 G/DL (ref 3.4–5)
ALBUMIN SERPL-MCNC: 2 G/DL (ref 3.4–5)
ANION GAP SERPL CALCULATED.3IONS-SCNC: 6 MMOL/L (ref 3–16)
ANION GAP SERPL CALCULATED.3IONS-SCNC: 8 MMOL/L (ref 3–16)
BASE EXCESS ARTERIAL: 4.9 MMOL/L (ref -3–3)
BUN BLDV-MCNC: 14 MG/DL (ref 7–20)
BUN BLDV-MCNC: 16 MG/DL (ref 7–20)
CALCIUM IONIZED: 0.97 MMOL/L (ref 1.12–1.32)
CALCIUM SERPL-MCNC: 7.2 MG/DL (ref 8.3–10.6)
CALCIUM SERPL-MCNC: 7.3 MG/DL (ref 8.3–10.6)
CARBOXYHEMOGLOBIN ARTERIAL: 0.1 % (ref 0–1.5)
CHLORIDE BLD-SCNC: 100 MMOL/L (ref 99–110)
CHLORIDE BLD-SCNC: 101 MMOL/L (ref 99–110)
CO2: 29 MMOL/L (ref 21–32)
CO2: 31 MMOL/L (ref 21–32)
CREAT SERPL-MCNC: 1.1 MG/DL (ref 0.8–1.3)
CREAT SERPL-MCNC: 1.2 MG/DL (ref 0.8–1.3)
EKG ATRIAL RATE: 69 BPM
EKG DIAGNOSIS: NORMAL
EKG P AXIS: 82 DEGREES
EKG P-R INTERVAL: 254 MS
EKG Q-T INTERVAL: 448 MS
EKG QRS DURATION: 90 MS
EKG QTC CALCULATION (BAZETT): 480 MS
EKG R AXIS: 35 DEGREES
EKG T AXIS: 73 DEGREES
EKG VENTRICULAR RATE: 69 BPM
GFR AFRICAN AMERICAN: >60
GFR AFRICAN AMERICAN: >60
GFR NON-AFRICAN AMERICAN: 57
GFR NON-AFRICAN AMERICAN: >60
GLUCOSE BLD-MCNC: 116 MG/DL (ref 70–99)
GLUCOSE BLD-MCNC: 95 MG/DL (ref 70–99)
HCO3 ARTERIAL: 28.4 MMOL/L (ref 21–29)
HEMOGLOBIN, ART, EXTENDED: 11.9 G/DL (ref 13.5–17.5)
LACTIC ACID: 2 MMOL/L (ref 0.4–2)
LACTIC ACID: 2.1 MMOL/L (ref 0.4–2)
LACTIC ACID: 2.4 MMOL/L (ref 0.4–2)
LACTIC ACID: 2.8 MMOL/L (ref 0.4–2)
MAGNESIUM: 2.1 MG/DL (ref 1.8–2.4)
METHEMOGLOBIN ARTERIAL: 0.5 %
O2 CONTENT ARTERIAL: 16 ML/DL
O2 SAT, ARTERIAL: 95.2 %
O2 THERAPY: ABNORMAL
ORGANISM: ABNORMAL
ORGANISM: ABNORMAL
PCO2 ARTERIAL: 38 MMHG (ref 35–45)
PH ARTERIAL: 7.49 (ref 7.35–7.45)
PH VENOUS: 7.5 (ref 7.35–7.45)
PHOSPHORUS: 1.1 MG/DL (ref 2.5–4.9)
PHOSPHORUS: 1.2 MG/DL (ref 2.5–4.9)
PO2 ARTERIAL: 72.1 MMHG (ref 75–108)
POTASSIUM SERPL-SCNC: 3.4 MMOL/L (ref 3.5–5.1)
POTASSIUM SERPL-SCNC: 3.5 MMOL/L (ref 3.5–5.1)
REASON FOR REJECTION: NORMAL
REJECTED TEST: NORMAL
SODIUM BLD-SCNC: 137 MMOL/L (ref 136–145)
SODIUM BLD-SCNC: 138 MMOL/L (ref 136–145)
TCO2 ARTERIAL: 29.6 MMOL/L
TROPONIN: 0.12 NG/ML
URINE CULTURE, ROUTINE: ABNORMAL

## 2018-08-05 PROCEDURE — 6370000000 HC RX 637 (ALT 250 FOR IP): Performed by: INTERNAL MEDICINE

## 2018-08-05 PROCEDURE — 6360000002 HC RX W HCPCS: Performed by: ANESTHESIOLOGY

## 2018-08-05 PROCEDURE — 94640 AIRWAY INHALATION TREATMENT: CPT

## 2018-08-05 PROCEDURE — 2500000003 HC RX 250 WO HCPCS: Performed by: EMERGENCY MEDICINE

## 2018-08-05 PROCEDURE — 83735 ASSAY OF MAGNESIUM: CPT

## 2018-08-05 PROCEDURE — 2580000003 HC RX 258: Performed by: INTERNAL MEDICINE

## 2018-08-05 PROCEDURE — 80069 RENAL FUNCTION PANEL: CPT

## 2018-08-05 PROCEDURE — 6370000000 HC RX 637 (ALT 250 FOR IP): Performed by: FAMILY MEDICINE

## 2018-08-05 PROCEDURE — 2580000003 HC RX 258: Performed by: EMERGENCY MEDICINE

## 2018-08-05 PROCEDURE — 94003 VENT MGMT INPAT SUBQ DAY: CPT

## 2018-08-05 PROCEDURE — 6360000002 HC RX W HCPCS: Performed by: INTERNAL MEDICINE

## 2018-08-05 PROCEDURE — 71045 X-RAY EXAM CHEST 1 VIEW: CPT

## 2018-08-05 PROCEDURE — 82330 ASSAY OF CALCIUM: CPT

## 2018-08-05 PROCEDURE — 94761 N-INVAS EAR/PLS OXIMETRY MLT: CPT

## 2018-08-05 PROCEDURE — 84484 ASSAY OF TROPONIN QUANT: CPT

## 2018-08-05 PROCEDURE — 94770 HC ETCO2 MONITOR DAILY: CPT

## 2018-08-05 PROCEDURE — 93005 ELECTROCARDIOGRAM TRACING: CPT | Performed by: INTERNAL MEDICINE

## 2018-08-05 PROCEDURE — 2580000003 HC RX 258

## 2018-08-05 PROCEDURE — 82803 BLOOD GASES ANY COMBINATION: CPT

## 2018-08-05 PROCEDURE — 83605 ASSAY OF LACTIC ACID: CPT

## 2018-08-05 PROCEDURE — 99291 CRITICAL CARE FIRST HOUR: CPT | Performed by: INTERNAL MEDICINE

## 2018-08-05 PROCEDURE — 2580000003 HC RX 258: Performed by: FAMILY MEDICINE

## 2018-08-05 PROCEDURE — 93010 ELECTROCARDIOGRAM REPORT: CPT | Performed by: INTERNAL MEDICINE

## 2018-08-05 PROCEDURE — 2500000003 HC RX 250 WO HCPCS: Performed by: INTERNAL MEDICINE

## 2018-08-05 PROCEDURE — 2700000000 HC OXYGEN THERAPY PER DAY

## 2018-08-05 PROCEDURE — 94750 HC PULMONARY COMPLIANCE STUDY: CPT

## 2018-08-05 PROCEDURE — 2000000000 HC ICU R&B

## 2018-08-05 RX ORDER — SODIUM CHLORIDE 9 MG/ML
INJECTION, SOLUTION INTRAVENOUS
Status: COMPLETED
Start: 2018-08-05 | End: 2018-08-05

## 2018-08-05 RX ORDER — ATORVASTATIN CALCIUM 40 MG/1
40 TABLET, FILM COATED ORAL NIGHTLY
Status: DISCONTINUED | OUTPATIENT
Start: 2018-08-05 | End: 2018-08-07

## 2018-08-05 RX ORDER — SODIUM CHLORIDE, SODIUM GLUCONATE, SODIUM ACETATE, POTASSIUM CHLORIDE AND MAGNESIUM CHLORIDE 526; 502; 368; 37; 30 MG/100ML; MG/100ML; MG/100ML; MG/100ML; MG/100ML
INJECTION, SOLUTION INTRAVENOUS
Status: COMPLETED
Start: 2018-08-05 | End: 2018-08-05

## 2018-08-05 RX ORDER — SODIUM CHLORIDE 9 MG/ML
INJECTION, SOLUTION INTRAVENOUS
Status: DISPENSED
Start: 2018-08-05 | End: 2018-08-05

## 2018-08-05 RX ORDER — ASPIRIN 81 MG/1
81 TABLET, CHEWABLE ORAL DAILY
Status: DISCONTINUED | OUTPATIENT
Start: 2018-08-05 | End: 2018-08-14 | Stop reason: HOSPADM

## 2018-08-05 RX ORDER — SODIUM CHLORIDE, SODIUM GLUCONATE, SODIUM ACETATE, POTASSIUM CHLORIDE AND MAGNESIUM CHLORIDE 526; 502; 368; 37; 30 MG/100ML; MG/100ML; MG/100ML; MG/100ML; MG/100ML
100 INJECTION, SOLUTION INTRAVENOUS CONTINUOUS
Status: DISPENSED | OUTPATIENT
Start: 2018-08-05 | End: 2018-08-06

## 2018-08-05 RX ADMIN — APIXABAN 5 MG: 5 TABLET, FILM COATED ORAL at 08:22

## 2018-08-05 RX ADMIN — BRIMONIDINE TARTRATE 1 DROP: 2 SOLUTION OPHTHALMIC at 20:31

## 2018-08-05 RX ADMIN — MEROPENEM 500 MG: 500 INJECTION, POWDER, FOR SOLUTION INTRAVENOUS at 05:25

## 2018-08-05 RX ADMIN — DORZOLAMIDE HYDROCHLORIDE AND TIMOLOL MALEATE 1 DROP: 20; 5 SOLUTION/ DROPS OPHTHALMIC at 20:31

## 2018-08-05 RX ADMIN — SODIUM CHLORIDE, SODIUM GLUCONATE, SODIUM ACETATE, POTASSIUM CHLORIDE AND MAGNESIUM CHLORIDE 100 ML/HR: 526; 502; 368; 37; 30 INJECTION, SOLUTION INTRAVENOUS at 11:03

## 2018-08-05 RX ADMIN — POTASSIUM PHOSPHATE, MONOBASIC AND POTASSIUM PHOSPHATE, DIBASIC 20 MMOL: 224; 236 INJECTION, SOLUTION, CONCENTRATE INTRAVENOUS at 09:41

## 2018-08-05 RX ADMIN — IPRATROPIUM BROMIDE AND ALBUTEROL SULFATE 1 AMPULE: .5; 3 SOLUTION RESPIRATORY (INHALATION) at 15:47

## 2018-08-05 RX ADMIN — IPRATROPIUM BROMIDE AND ALBUTEROL SULFATE 1 AMPULE: .5; 3 SOLUTION RESPIRATORY (INHALATION) at 08:08

## 2018-08-05 RX ADMIN — IPRATROPIUM BROMIDE AND ALBUTEROL SULFATE 1 AMPULE: .5; 3 SOLUTION RESPIRATORY (INHALATION) at 20:37

## 2018-08-05 RX ADMIN — LATANOPROST 1 DROP: 50 SOLUTION OPHTHALMIC at 20:31

## 2018-08-05 RX ADMIN — ASPIRIN 81 MG 81 MG: 81 TABLET ORAL at 16:10

## 2018-08-05 RX ADMIN — NOREPINEPHRINE BITARTRATE 6 MCG/MIN: 1 INJECTION INTRAVENOUS at 08:29

## 2018-08-05 RX ADMIN — BRIMONIDINE TARTRATE 1 DROP: 2 SOLUTION OPHTHALMIC at 08:31

## 2018-08-05 RX ADMIN — MEROPENEM 500 MG: 500 INJECTION, POWDER, FOR SOLUTION INTRAVENOUS at 17:44

## 2018-08-05 RX ADMIN — SENNOSIDES 8.6 MG: 8.6 TABLET, FILM COATED ORAL at 08:23

## 2018-08-05 RX ADMIN — CALCIUM GLUCONATE 1 G: 98 INJECTION, SOLUTION INTRAVENOUS at 13:43

## 2018-08-05 RX ADMIN — VASOPRESSIN 0.04 UNITS/MIN: 20 INJECTION INTRAVENOUS at 20:02

## 2018-08-05 RX ADMIN — OXYCODONE HYDROCHLORIDE AND ACETAMINOPHEN 500 MG: 500 TABLET ORAL at 08:23

## 2018-08-05 RX ADMIN — ATORVASTATIN CALCIUM 40 MG: 40 TABLET, FILM COATED ORAL at 20:30

## 2018-08-05 RX ADMIN — FERROUS SULFATE TAB 325 MG (65 MG ELEMENTAL FE) 325 MG: 325 (65 FE) TAB at 08:23

## 2018-08-05 RX ADMIN — SODIUM BICARBONATE: 84 INJECTION, SOLUTION INTRAVENOUS at 06:30

## 2018-08-05 RX ADMIN — SODIUM CHLORIDE, PRESERVATIVE FREE 10 ML: 5 INJECTION INTRAVENOUS at 20:30

## 2018-08-05 RX ADMIN — DORZOLAMIDE HYDROCHLORIDE AND TIMOLOL MALEATE 1 DROP: 20; 5 SOLUTION/ DROPS OPHTHALMIC at 08:32

## 2018-08-05 RX ADMIN — SODIUM CHLORIDE 1000 ML: 9 INJECTION, SOLUTION INTRAVENOUS at 16:10

## 2018-08-05 RX ADMIN — SODIUM CHLORIDE, PRESERVATIVE FREE 10 ML: 5 INJECTION INTRAVENOUS at 08:31

## 2018-08-05 RX ADMIN — PROPOFOL 10 MCG/KG/MIN: 10 INJECTION, EMULSION INTRAVENOUS at 20:40

## 2018-08-05 RX ADMIN — SODIUM CHLORIDE 250 ML: 9 INJECTION, SOLUTION INTRAVENOUS at 06:30

## 2018-08-05 RX ADMIN — PANTOPRAZOLE SODIUM 40 MG: 40 TABLET, DELAYED RELEASE ORAL at 08:23

## 2018-08-05 RX ADMIN — PROPOFOL 25 MCG/KG/MIN: 10 INJECTION, EMULSION INTRAVENOUS at 05:15

## 2018-08-05 RX ADMIN — APIXABAN 5 MG: 5 TABLET, FILM COATED ORAL at 20:30

## 2018-08-05 RX ADMIN — IPRATROPIUM BROMIDE AND ALBUTEROL SULFATE 1 AMPULE: .5; 3 SOLUTION RESPIRATORY (INHALATION) at 12:19

## 2018-08-05 RX ADMIN — Medication 1 CAPSULE: at 08:23

## 2018-08-05 RX ADMIN — SENNOSIDES 8.6 MG: 8.6 TABLET, FILM COATED ORAL at 20:30

## 2018-08-05 RX ADMIN — SODIUM CHLORIDE, SODIUM GLUCONATE, SODIUM ACETATE, POTASSIUM CHLORIDE AND MAGNESIUM CHLORIDE 100 ML/HR: 526; 502; 368; 37; 30 INJECTION, SOLUTION INTRAVENOUS at 20:08

## 2018-08-05 RX ADMIN — VASOPRESSIN 0.04 UNITS/MIN: 20 INJECTION INTRAVENOUS at 11:57

## 2018-08-05 RX ADMIN — Medication 1 CAPSULE: at 17:44

## 2018-08-05 ASSESSMENT — PULMONARY FUNCTION TESTS
PIF_VALUE: 23
PIF_VALUE: 20
PIF_VALUE: 18
PIF_VALUE: 28
PIF_VALUE: 24
PIF_VALUE: 17

## 2018-08-05 ASSESSMENT — PAIN SCALES - GENERAL
PAINLEVEL_OUTOF10: 0

## 2018-08-05 NOTE — PROGRESS NOTES
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 Comorbidities reviewed and education provided as appropriate. Patient and/or family's stated goal of care: {Blank single:32010::\"reduce shortness of breath prior to discharge\",\"increase activity tolerance prior to discharge\",\"better understand heart failure and disease management prior to discharge\",\"reduce lower extremity edema prior to discharge\",\"comfort care measures only\",\"unable to assess at this time\"}    Pt is currently {Blank single:37935::\"intubated on a ventilator\",\"on BiPAP\",\"on CPAP\",\"on *** L O2\"}. Pt {Blank single:97382::\"without\",\"with pitting\",\"with nonpitting\"} lower extremity edema.  Patient's weights and intake/output reviewed:    Patient Vitals for the past 96 hrs (Last 3 readings):   Weight   08/05/18 0544 186 lb 1.1 oz (84.4 kg)   08/04/18 0600 177 lb 14.6 oz (80.7 kg)   08/02/18 1126 158 lb (71.7 kg)       Intake/Output Summary (Last 24 hours) at 08/05/18 1832  Last data filed at 08/05/18 1759   Gross per 24 hour   Intake             6963 ml   Output             7100 ml   Net             -137 ml       Patient provided with education on CHF signs/symptoms, causes, discharge medications, daily weights, low sodium diet, activity, and follow-up: {YES/NO/NA:81619}    Pt {Blank single:57206::\"verbalized understanding\",\"demonstrates understanding\",\"education needs reinforcement\",\"with no evidence of learning\",\"refuses

## 2018-08-05 NOTE — PROCEDURES
Bronchoscopy note    Patient with acute postoperative respiratory insufficiency, pulmonary infiltrates, atelectasis, UTI, sepsis with septic shock, continued to be on mechanical vent support, patient has bilateral consolidation on the imaging and given the patient's clinical status and imaging it was decided that patient will benefit from bronchoscopy for diagnostic and therapeutic purposes and for that reason after informed consent from the patient's family, the endoscopy team was requested to come to the bedside, patient was given tracheobronchial analgesia with lidocaine, patient was not given any additional sedation for the procedure, the bronchoscopy was introduced the EGD was using a T piece adapter, patient had thick mucous plugs in the discomfort aspect of endotracheal tube which was suctioned out, the entire tracheobronchial tree except for the right upper lobe was full of thick mucous plugs which were purulent/viscus and tenacious; the bronchoscope was wedged into the right lower lobe and BAL was done from that area; the rest of the tracheobronchial tree was therapeutically lavaged out/aspirated out using Mucomyst and saline; patient's BAL from right lower lobe was sent for cultures  Patient tolerated the procedure well and did not have any immediate complication  Further treatment depending on patient's clinical status and the bronchoscopy results    Paolo arreaga MD

## 2018-08-05 NOTE — PROGRESS NOTES
CONTRAST   Final Result   No acute intracranial abnormality. FLUORO FOR SURGICAL PROCEDURES    (Results Pending)   XR CHEST PORTABLE    (Results Pending)     Results for Kiko Cuello (MRN [id number]) as of 8/5/2018 11:03   Ref. Range 8/4/2018 11:58 8/4/2018 17:21 8/5/2018 00:15 8/5/2018 00:41 8/5/2018 05:30   Sodium Latest Ref Range: 136 - 145 mmol/L 131 (L) 132 (L) 137  138   Potassium Latest Ref Range: 3.5 - 5.1 mmol/L 3.5 3.1 (L) 3.5  3.4 (L)   Chloride Latest Ref Range: 99 - 110 mmol/L 94 (L) 95 (L) 100  101   CO2 Latest Ref Range: 21 - 32 mmol/L 26 27 29  31   BUN Latest Ref Range: 7 - 20 mg/dL 19 17 16  14   Creatinine Latest Ref Range: 0.8 - 1.3 mg/dL 1.5 (H) 1.4 (H) 1.2  1.1   Anion Gap Latest Ref Range: 3 - 16  11 10 8  6   GFR Non- Latest Ref Range: >60  44 (A) 48 (A) 57 (A)  >60   GFR  Latest Ref Range: >60  53 (A) 58 (A) >60  >60   Magnesium Latest Ref Range: 1.80 - 2.40 mg/dL     2.10   Lactic Acid Latest Ref Range: 0.4 - 2.0 mmol/L 3.8 (H) 3.5 (H)  2.8 (H) 2.4 (H)   Glucose Latest Ref Range: 70 - 99 mg/dL 164 (H) 129 (H) 116 (H)  95   Calcium Latest Ref Range: 8.3 - 10.6 mg/dL 6.7 (L) 6.6 (L) 7.2 (L)  7.3 (L)   Phosphorus Latest Ref Range: 2.5 - 4.9 mg/dL 1.5 (L) 1.3 (L) 1.2 (L)  1.1 (L)   Albumin Latest Ref Range: 3.4 - 5.0 g/dL 1.9 (L) 1.9 (L) 1.8 (L)  2.0 (L)     Results for Kiko Cuello (MRN [id number]) as of 8/5/2018 11:03   Ref.  Range 8/3/2018 18:06 8/4/2018 05:30 8/5/2018 07:20   Hemoglobin, Art, Extended Latest Ref Range: 13.5 - 17.5 g/dL 10.1 (L) 10.7 (L) 11.9 (L)   pH, Arterial Latest Ref Range: 7.350 - 7.450  7.380 7.481 (H) 7.492 (H)   pCO2, Arterial Latest Ref Range: 35.0 - 45.0 mmHg 29.5 (L) 34.6 (L) 38.0   pO2, Arterial Latest Ref Range: 75.0 - 108.0 mmHg 147.5 (H) 84.4 72.1 (L)   HCO3, Arterial Latest Ref Range: 21.0 - 29.0 mmol/L 17.1 (L) 25.2 28.4   TCO2 (calc), Art Latest Ref Range: Not Established mmol/L 18.0 26.3 29.6   Base Excess, Arterial Latest Ref Range: -3.0 - 3.0 mmol/L -7.0 (L) 2.0 4.9 (H)   O2 Sat, Arterial Latest Ref Range: >92 % 98.5 96.7 95.2   O2 Content, Arterial Latest Ref Range: Not Established mL/dL 14 15 16   Methemoglobin, Arterial Latest Ref Range: <1.5 % 0.6 0.5 0.5   Carboxyhgb, Arterial Latest Ref Range: 0.0 - 1.5 % 0.3 0.3 0.1     Results for Rakesh Mckinney (MRN [id number]) as of 8/5/2018 11:03   Ref. Range 8/2/2018 11:40 8/2/2018 11:40 8/2/2018 11:40 8/2/2018 11:40 8/2/2018 11:40 8/2/2018 14:15 8/2/2018 14:15 8/4/2018 05:44 8/4/2018 14:00   CULTURE BLOOD #2 Unknown Rpt (A)       Rpt    CULTURE BLOOD, PCR REPORT Unknown Rpt           RESPIRATORY CULTURE Unknown         Rpt   URINE CULTURE Unknown      Rpt (A)      Culture, Blood 2 Unknown POSITIVE for. .. POSITIVE for. .. See additional re. .. See additional re. .. Gram stain Aerobi. .. (A)   No Growth to date. .. Gram Stain Result Unknown         2+ WBC's (Polymor. .. Organism Unknown Pseudomonas aerug. .. (A) Escherichia coli (A) Pseudomonas aerug. .. (A) Escherichia coli . .. (A)  Pseudomonas aerug. .. (A) Escherichia coli (A)     L. pneumophila Serogp 1 Ur Ag Unknown      Presumptive Negat. .. LEGIONELLA ANTIGEN, URINE Unknown      Rpt             SINGLE XRAY VIEW OF THE CHEST       8/5/2018 6:16 am       COMPARISON:   08/04/2018       HISTORY:   ORDERING SYSTEM PROVIDED HISTORY: resp failure   TECHNOLOGIST PROVIDED HISTORY:   Reason for exam:->resp failure       FINDINGS:   Tubes and lines remain in place.  Pleural effusions and bibasilar airspace   disease are again seen.  Airspace disease appears increased.  The heart size   is within normal limits.  There is no discernible pneumothorax.           Impression   Pleural effusions with increasing atelectasis and/or pneumonia.          Assessment:  Active Problems:    Sepsis ([address]    E coli bacteremia    Septic shock (HCC)    Pyuria    GONSALO (acute kidney injury) ([facility] [address]    Lactic acidosis    Elevated troponin Open-angle glaucoma    Altered mental status    Bacteremia due to Pseudomonas    Pulmonary infiltrates    Nephrolithiasis    Hydronephrosis with urinary obstruction due to renal calculus    Fecal impaction [facility])    Pancreatic cyst    Acute respiratory insufficiency, postoperative    Acute renal failure ([facility] [address]  Resolved Problems:    * No resolved hospital problems.  *          Plan:   · Ventilatory support  to keep saturation more than 90%  · Vent waveforms and settings reviewed and changes made  · IV sedation to maintain patient -ventilator syncrony as per RASS   · Pulmonary toilet-s/p  bronchoscopy for diagnostic and therapeutic purposes   · Will reassess in AM -may need Rpt therapeutic bronchoscopy as the pulmonary infiltrates/atelectasis is increaseing   · Monitor for any hypoventilation  · Iv fluids as per nephrology   · IV pressors to keep MAP >65 mm Hg   · Antibiotics as per ID   · Monitor input output and BMP closely  · Corrected electrolytes on when necessary basis  · Patient is on Eliquis -dose can be decreased /stoped -IM to decide   · Enteral feeds started -to be titrated as metabolic support   · Eyedrops for glaucoma can be continued  · Monitor I/o and BMP  · PUD prophylaxis     Case discussed with  ICU team      Critical care time spent -35 minutes exclusive of any procedures          Electronically signed by:  Denzel Calle MD    8/5/2018    10:59 AM.

## 2018-08-05 NOTE — PROGRESS NOTES
Results:  CBC:   Recent Labs      08/03/18   0443  08/03/18   1806  08/04/18   0530   WBC  24.2*  31.8*  27.2*   HGB  10.7*  10.5*  10.3*   HCT  32.9*  30.9*  29.9*   MCV  93.4  91.7  90.8   PLT  155  130*  103*     BMP:   Recent Labs      08/04/18   0530  08/04/18   1158  08/04/18   1721   NA  134*  131*  132*   K  3.2*  3.5  3.1*   CL  96*  94*  95*   CO2  25  26  27   PHOS  1.9*  1.5*  1.3*   BUN  21*  19  17   CREATININE  1.8*  1.5*  1.4*     LIVER PROFILE:   Recent Labs      08/02/18   1137  08/04/18   0530   AST  49*  290*   ALT  30  335*   BILITOT  0.8  0.4   ALKPHOS  192*  110     UA:  Recent Labs      08/02/18   1415   COLORU  Yellow   PHUR  6.0   WBCUA  >100*   RBCUA  *   BACTERIA  1+*   CLARITYU  Clear   SPECGRAV  1.010   LEUKOCYTESUR  LARGE*   UROBILINOGEN  4.0*   BILIRUBINUR  Negative   BLOODU  MODERATE*   GLUCOSEU  Negative   AMORPHOUS  Rare*       Imaging:  I have reviewed radiology images personally. XR CHEST PORTABLE   Final Result   1. Bibasilar atelectasis and/or pneumonia. 2. The enteric tube is in good position in the stomach. XR ABDOMEN (KUB) (SINGLE AP VIEW)   Final Result   Successful placement of right ureteral stent. CT ABDOMEN PELVIS WO CONTRAST Additional Contrast? None   Final Result   1. 4 mm obstructing nephrolithiasis within the right proximal ureter causing   moderate hydronephrosis. 2. Bilateral lower lobe pneumonia. Recommend chest radiograph in 8 weeks to   confirm resolution. 3. Mildly dilated appendix without periappendiceal inflammation can be seen   in the setting of early acute appendicitis. 4. Unchanged 2.9 cm pancreatic cystic lesion. 5. Fecal impaction. XR CHEST PORTABLE   Final Result   Curvilinear opacity within the medial right lung base, which could represent   atelectasis, pneumonia, or aspiration. XR CHEST PORTABLE   Final Result   1. Right internal jugular catheter terminating in the mid SVC.          CT appendix without periappendiceal inflammation can be seen   in the setting of early acute appendicitis. 4. Unchanged 2.9 cm pancreatic cystic lesion. 5. Fecal impaction. SINGLE XRAY VIEW OF THE CHEST       8/4/2018 5:14 am       COMPARISON:   08/02/2018       HISTORY:   ORDERING SYSTEM PROVIDED HISTORY: resp failure   TECHNOLOGIST PROVIDED HISTORY:   Reason for exam:->resp failure   Ordering Physician Provided Reason for Exam: f/u sepsis   Acuity: Unknown   Type of Exam: Subsequent/Follow-up       FINDINGS:   An endotracheal tube is identified though the tip is partially obscured by a   monitoring clip.  Enteric tube traverses the esophagus with the tip and side   hole in the stomach.  A right internal jugular central venous catheter is   unchanged.  There is bibasilar airspace disease, left greater than right. There are bilateral pleural effusions.  The heart size is within normal   limits.  There is no discernible pneumothorax. Results for Leona Huerta (MRN [id number]) as of 8/4/2018 21:03   Ref. Range 8/3/2018 04:43 8/3/2018 12:06 8/3/2018 18:06 8/3/2018 23:36 8/4/2018 05:30 8/4/2018 11:58 8/4/2018 17:21   Lactic Acid Latest Ref Range: 0.4 - 2.0 mmol/L 4.8 (HH) 5.1 (HH) 4.7 (HH) 5.0 (HH) 4.6 (HH) 3.8 (H) 3.5 (H)       Assessment:  Active Problems:    Sepsis (HCC)    E coli bacteremia    Septic shock (HCC)    Pyuria    GONSALO (acute kidney injury) ([address]    Lactic acidosis    Elevated troponin    Open-angle glaucoma    Altered mental status    Bacteremia due to Pseudomonas    Pulmonary infiltrates    Nephrolithiasis    Hydronephrosis with urinary obstruction due to renal calculus    Fecal impaction (HCC)    Pancreatic cyst    Acute respiratory insufficiency, postoperative    Acute renal failure ([address]  Resolved Problems:    * No resolved hospital problems.  *          Plan:   · Ventilatory support  to keep saturation more than 90%  · Vent waveforms and settings reviewed and changes made  · IV sedation to maintain patient -ventilator syncrony   · Pulmonary toilet-would benefit from bronchoscopy for diagnostic and therapeutic purposes   · Monitor for any hypoventilation  · Iv fluids as per nephrology   · IV pressors(On Levophed/Epinephrine and Vasopressin) to keep MAP >65 mm Hg   · Antibiotics as per ID   · Trend the lactic acid  · Monitor input output and BMP closely  · Corrected electrolytes on when necessary basis  · Patient is on Eliquis -dose can be decreased /stoped -IM to decide   · Eyedrops for glaucoma can be continued  · Monitor I/o and BMP  · PUD prophylaxis     Case discussed with  ICU team      Critical care time spent -40 minutes exclusive of any procedures          Electronically signed by:  Bola Whiting MD    8/4/2018    9:00 PM.

## 2018-08-05 NOTE — PROGRESS NOTES
08/05/18 1226   Vent Information   Vt Ordered 450 mL   Peak Flow 40 L/min   Pressure Support 12 cmH20   FiO2  30 %   Sensitivity 3   PEEP/CPAP 5   Vent Patient Data   Vt Exhaled 381 mL   Rate Measured 16 br/min   Minute Volume 5.81 Liters   Peak Inspiratory Pressure 17 cmH2O   I:E Ratio 1:2.90   Mean Airway Pressure 9.69 cmH20   Spontaneous Breathing Trial (SBT) RT Doc   Pulse 71   SpO2 97 %   Breath Sounds   Right Upper Lobe Diminished   Right Middle Lobe Diminished   Right Lower Lobe Diminished   Left Upper Lobe Diminished   Left Lower Lobe Diminished   Additional Respiratory  Assessments   Resp 20   End Tidal CO2 30 (%)   Position Semi-Correa's   Alarm Settings   High Pressure Alarm 45 cmH2O   Low Minute Volume Alarm 2 L/min   High Respiratory Rate 40 br/min   Patient Observation   Observations (ambu @ bedside)   ETT (adult)   Placement Date/Time: 08/03/18 1305   Preoxygenation: Yes  Mask Ventilation: Mask ventilation not attempted (0)  Technique: Direct laryngoscopy;Video laryngoscopy  Type: Cuffed  Tube Size: 7 mm  Laryngoscope: Mac  Blade Size: 4  Location: Oral  Grade V...    Secured at 24 cm   Measured From Lips   ET Placement Right   Secured By Commercial tube boyd   Site Condition Dry

## 2018-08-05 NOTE — PROGRESS NOTES
1.4*  1.2  1.1   GLUCOSE  129*  116*  95     Recent Labs      18   1137  18   0530   AST  49*  290*   ALT  30  335*   BILITOT  0.8  0.4   ALKPHOS  192*  110     Troponin T:  0.08 ng/mL  Magnesium 1.70 mg/dL  Lactate: 5.0 / 3.3 / 4.8 / 5.1 / 4.7 / 5.0 / 4.6 / 3.8 / 2.0 mmol/L  Urine strep pneumoniae antigen negative  Urinary legionella antigen negative  Urine culture (8/2) > 100,000 CFU/ml Escherichia coli (multi-drug resistant) . .. Sensitive to meropenem (HARRIETT <= 1)  Blood Culture (8/2) Escherichia coli DNA Detected, Pseudomonas aeruginosa DNA Detected  Blood Culture (8/2) Escherichia coli, sensitivity pending     AB.49 / 38 / 72 on FiO2 30%    Objective:   Vitals:  /76   Pulse 98   Temp 98.5 °F (oral)   Resp 16   Ht 5' 9\" (1.753 m)   Wt 186 lb 1.1 oz (84.4 kg)   SpO2 98%   BMI 27.48 kg/m²   General appearance: sedated on vent  Lungs: ventilated bilaterally  Heart: distant, regular, no appreciable murmur  Abdomen: benign, audible bowel sounds  Extremities: 2+ edema, neurovascular intact  Neurologic:  Sedated on vent    Assessment and Plan:   1. Critically ill, septic shock on pressors (norepinephrine and restarted vasopressin) and antibiotic therapy with meropenem broad spectrum antibiotic for systemic E coli infection with DNA positive Pseudomonas. Antibiotic therapy consolidated to meropenem monotherapy, the likelihood of carbepenem resistant organisms is acceptably low per infectious disease (Dr. Tanesha Bell) and confirmed by urine culture with multi-drug resistant E coli sensitive to meropenum (HARRIETT < 1). 2. Respiratory failure requiring mechanical ventilation 30% FiO2 AC/VC. Bronchoscopy planned but transitioning to palliative care. 3. Hydronephrosis with urinary obstruction:  POD # 3 cystoscopy and right ureteral stent (Dr. Keily Shelton) to relieve obstruction of right kidney. 4. Incidental finding dilated appendix on CT:  Appreciate general surgery evaluation (Dr. Blaze Deluna).   CT finding

## 2018-08-05 NOTE — FLOWSHEET NOTE
X  (intubated/ventilated)   Respiratory Pattern Regular   Respiratory Depth Normal   Respiratory Quality/Effort Unlabored   Chest Assessment Chest expansion symmetrical   L Breath Sounds Diminished   R Breath Sounds Diminished   Breath Sounds   Right Upper Lobe Diminished   Right Middle Lobe Diminished   Right Lower Lobe Diminished   Left Upper Lobe Diminished   Left Lower Lobe Diminished   ETT (adult)   Placement Date/Time: 08/03/18 1305   Preoxygenation: Yes  Mask Ventilation: Mask ventilation not attempted (0)  Technique: Direct laryngoscopy;Video laryngoscopy  Type: Cuffed  Tube Size: 7 mm  Laryngoscope: Mac  Blade Size: 4  Location: Oral  Grade V... Secured at 24 cm   Measured From Lips   Secured By Commercial tube boyd   Site Condition Dry   Cardiac   Cardiac (WDL) WDL   Cardiac Rhythm NSR   Ectopy PVC   Ectopy Frequency Rare   Cardiac Monitor   Telemetry Monitor On Yes   Telemetry Audible Yes   Telemetry Alarms Set Yes   Telemetry Box Number ICU monitor   Gastrointestinal   Abdominal (WDL) X   Abdomen Inspection Soft;Rounded   Bowel Sounds (All Quadrants) Active   Tenderness Nontender   Bowel Sounds   RUQ Bowel Sounds Active   LUQ Bowel Sounds Active   RLQ Bowel Sounds Active   LLQ Bowel Sounds Active   Peripheral Vascular   Peripheral Vascular (WDL) WDL   Edema Generalized   Edema Generalized +2;Pitting   Skin Color/Condition   Skin Color/Condition (WDL) WDL   Skin Integrity   Skin Integrity (WDL) WDL   Skin Integrity Other (Comment)  (suprapubic catheter, surgical incision)   Location suprapubic   Preventative Dressing Yes   Dressing Site Sacrum   Assessed this shift?  Yes   Musculoskeletal   Musculoskeletal (WDL) X  (intubated/sedated)   RUE MARCELLO   LUE MARCELLO   RL Extremity MARCELLO   LL Extremity MARCELLO   Genitourinary   Genitourinary (WDL) X  (suprapubic catheter)   Flank Tenderness MARCELLO   Suprapubic Tenderness MARCELLO   Dysuria MARCELLO   Anus/Rectum   Anus/Rectum (WDL) WDL   Psychosocial   Psychosocial (WDL) X  (intubated)

## 2018-08-05 NOTE — PROGRESS NOTES
97.7 °F (36.5 °C) (Axillary)   Resp 17   Ht 5' 9\" (1.753 m)   Wt 177 lb 14.6 oz (80.7 kg)   SpO2 96%   BMI 26.27 kg/m²   SPO2 (COPD values may differ): 90-91% on room air or greater than 92% on FiO2 24- 28% = 1    Peak Flow (asthma only): not applicable = 0    RSI: 15-06 = Q6H or QID and Q4HPRN for dyspnea        Plan       Goals: medication delivery, mobilize retained secretions, volume expansion and improve oxygenation    Patient/caregiver was educated on the proper method of use for Respiratory Care Devices:  Yes      Level of patient/caregiver understanding able to:   [] Verbalize understanding   [] Demonstrate understanding       [] Teach back        [] Needs reinforcement       []  No available caregiver               []  Other:     Response to education:  Good     Is patient being placed on Home Treatment Regimen? No     Does the patient have everything they need prior to discharge? NA     Comments: Patient assessed and medications reviewed. Plan of Care: [facility] Q4WA    Electronically signed by Negrito Marte RCP on 8/4/2018 at 11:19 PM    Respiratory Protocol Guidelines     1. Assessment and treatment by Respiratory Therapy will be initiated for medication and therapeutic interventions upon initiation of aerosolized medication. 2. Physician will be contacted for respiratory rate (RR) greater than 35 breaths per minute. Therapy will be held for heart rate (HR) greater than 140 beats per minute, pending direction from physician. 3. Bronchodilators will be administered via Metered Dose Inhaler (MDI) with spacer when the following criteria are met:  a. Alert and cooperative     b. HR < 140 bpm  c. RR < 30 bpm                d. Can demonstrate a 23 second inspiratory hold  4. Bronchodilators will be administered via Hand Held Nebulizer [facility]) to patients when ANY of the following criteria are met  a. Incognizant or uncooperative          b. Patients treated with HHN at Home        c.  Unable to

## 2018-08-05 NOTE — PROGRESS NOTES
Pt assessed as documented. Propofol decreased to 5mcg/kg/min, prepping for SAT. Will continue to monitor.

## 2018-08-06 ENCOUNTER — APPOINTMENT (OUTPATIENT)
Dept: GENERAL RADIOLOGY | Age: 83
DRG: 853 | End: 2018-08-06
Payer: COMMERCIAL

## 2018-08-06 LAB
ALBUMIN SERPL-MCNC: 1.9 G/DL (ref 3.4–5)
ANION GAP SERPL CALCULATED.3IONS-SCNC: 8 MMOL/L (ref 3–16)
BUN BLDV-MCNC: 13 MG/DL (ref 7–20)
CALCIUM SERPL-MCNC: 7.5 MG/DL (ref 8.3–10.6)
CHLORIDE BLD-SCNC: 105 MMOL/L (ref 99–110)
CO2: 28 MMOL/L (ref 21–32)
CREAT SERPL-MCNC: 1 MG/DL (ref 0.8–1.3)
CULTURE, RESPIRATORY: NORMAL
GFR AFRICAN AMERICAN: >60
GFR NON-AFRICAN AMERICAN: >60
GLUCOSE BLD-MCNC: 102 MG/DL (ref 70–99)
GRAM STAIN RESULT: NORMAL
HCT VFR BLD CALC: 31.6 % (ref 40.5–52.5)
HEMOGLOBIN: 10.4 G/DL (ref 13.5–17.5)
LV EF: 58 %
LVEF MODALITY: NORMAL
MCH RBC QN AUTO: 30 PG (ref 26–34)
MCHC RBC AUTO-ENTMCNC: 32.8 G/DL (ref 31–36)
MCV RBC AUTO: 91.4 FL (ref 80–100)
PDW BLD-RTO: 13.9 % (ref 12.4–15.4)
PHOSPHORUS: 1.8 MG/DL (ref 2.5–4.9)
PLATELET # BLD: 77 K/UL (ref 135–450)
PLATELET SLIDE REVIEW: ABNORMAL
PMV BLD AUTO: 9.9 FL (ref 5–10.5)
POTASSIUM SERPL-SCNC: 3.2 MMOL/L (ref 3.5–5.1)
RBC # BLD: 3.45 M/UL (ref 4.2–5.9)
SLIDE REVIEW: ABNORMAL
SODIUM BLD-SCNC: 141 MMOL/L (ref 136–145)
WBC # BLD: 18 K/UL (ref 4–11)

## 2018-08-06 PROCEDURE — 80069 RENAL FUNCTION PANEL: CPT

## 2018-08-06 PROCEDURE — 2500000003 HC RX 250 WO HCPCS: Performed by: INTERNAL MEDICINE

## 2018-08-06 PROCEDURE — 93306 TTE W/DOPPLER COMPLETE: CPT | Performed by: INTERNAL MEDICINE

## 2018-08-06 PROCEDURE — 2580000003 HC RX 258: Performed by: INTERNAL MEDICINE

## 2018-08-06 PROCEDURE — 2000000000 HC ICU R&B

## 2018-08-06 PROCEDURE — 99221 1ST HOSP IP/OBS SF/LOW 40: CPT | Performed by: INTERNAL MEDICINE

## 2018-08-06 PROCEDURE — 99233 SBSQ HOSP IP/OBS HIGH 50: CPT | Performed by: INTERNAL MEDICINE

## 2018-08-06 PROCEDURE — 31646 BRNCHSC W/THER ASPIR SBSQ: CPT | Performed by: INTERNAL MEDICINE

## 2018-08-06 PROCEDURE — 94770 HC ETCO2 MONITOR DAILY: CPT

## 2018-08-06 PROCEDURE — 2580000003 HC RX 258: Performed by: FAMILY MEDICINE

## 2018-08-06 PROCEDURE — 2709999900 HC NON-CHARGEABLE SUPPLY: Performed by: INTERNAL MEDICINE

## 2018-08-06 PROCEDURE — 85027 COMPLETE CBC AUTOMATED: CPT

## 2018-08-06 PROCEDURE — 2700000000 HC OXYGEN THERAPY PER DAY

## 2018-08-06 PROCEDURE — 6370000000 HC RX 637 (ALT 250 FOR IP): Performed by: INTERNAL MEDICINE

## 2018-08-06 PROCEDURE — 94003 VENT MGMT INPAT SUBQ DAY: CPT

## 2018-08-06 PROCEDURE — 71045 X-RAY EXAM CHEST 1 VIEW: CPT

## 2018-08-06 PROCEDURE — 94750 HC PULMONARY COMPLIANCE STUDY: CPT

## 2018-08-06 PROCEDURE — 99291 CRITICAL CARE FIRST HOUR: CPT | Performed by: INTERNAL MEDICINE

## 2018-08-06 PROCEDURE — 99152 MOD SED SAME PHYS/QHP 5/>YRS: CPT | Performed by: INTERNAL MEDICINE

## 2018-08-06 PROCEDURE — 94761 N-INVAS EAR/PLS OXIMETRY MLT: CPT

## 2018-08-06 PROCEDURE — 0T768DZ DILATION OF RIGHT URETER WITH INTRALUMINAL DEVICE, VIA NATURAL OR ARTIFICIAL OPENING ENDOSCOPIC: ICD-10-PCS | Performed by: UROLOGY

## 2018-08-06 PROCEDURE — 94640 AIRWAY INHALATION TREATMENT: CPT

## 2018-08-06 PROCEDURE — 6360000002 HC RX W HCPCS: Performed by: INTERNAL MEDICINE

## 2018-08-06 PROCEDURE — 3609027000 HC BRONCHOSCOPY: Performed by: INTERNAL MEDICINE

## 2018-08-06 PROCEDURE — 36592 COLLECT BLOOD FROM PICC: CPT

## 2018-08-06 PROCEDURE — 6370000000 HC RX 637 (ALT 250 FOR IP): Performed by: FAMILY MEDICINE

## 2018-08-06 RX ORDER — DOPAMINE HYDROCHLORIDE 160 MG/100ML
5 INJECTION, SOLUTION INTRAVENOUS CONTINUOUS
Status: DISCONTINUED | OUTPATIENT
Start: 2018-08-06 | End: 2018-08-06

## 2018-08-06 RX ORDER — ACETYLCYSTEINE 200 MG/ML
SOLUTION ORAL; RESPIRATORY (INHALATION) PRN
Status: DISCONTINUED | OUTPATIENT
Start: 2018-08-06 | End: 2018-08-06 | Stop reason: HOSPADM

## 2018-08-06 RX ORDER — MAGNESIUM HYDROXIDE 1200 MG/15ML
LIQUID ORAL CONTINUOUS PRN
Status: COMPLETED | OUTPATIENT
Start: 2018-08-06 | End: 2018-08-06

## 2018-08-06 RX ORDER — LIDOCAINE HYDROCHLORIDE 20 MG/ML
INJECTION, SOLUTION INFILTRATION; PERINEURAL PRN
Status: DISCONTINUED | OUTPATIENT
Start: 2018-08-06 | End: 2018-08-06 | Stop reason: HOSPADM

## 2018-08-06 RX ORDER — SODIUM CHLORIDE 9 MG/ML
INJECTION, SOLUTION INTRAVENOUS
Status: DISPENSED
Start: 2018-08-06 | End: 2018-08-07

## 2018-08-06 RX ORDER — SODIUM CHLORIDE 0.9 % (FLUSH) 0.9 %
10 SYRINGE (ML) INJECTION DAILY
Status: DISCONTINUED | OUTPATIENT
Start: 2018-08-06 | End: 2018-08-14 | Stop reason: HOSPADM

## 2018-08-06 RX ORDER — PANTOPRAZOLE SODIUM 40 MG/10ML
40 INJECTION, POWDER, LYOPHILIZED, FOR SOLUTION INTRAVENOUS DAILY
Status: DISCONTINUED | OUTPATIENT
Start: 2018-08-06 | End: 2018-08-14 | Stop reason: HOSPADM

## 2018-08-06 RX ORDER — FERROUS SULFATE 300 MG/5ML
300 LIQUID (ML) ORAL DAILY
Status: DISCONTINUED | OUTPATIENT
Start: 2018-08-06 | End: 2018-08-07

## 2018-08-06 RX ADMIN — DORZOLAMIDE HYDROCHLORIDE AND TIMOLOL MALEATE 1 DROP: 20; 5 SOLUTION/ DROPS OPHTHALMIC at 09:27

## 2018-08-06 RX ADMIN — POTASSIUM PHOSPHATE, MONOBASIC AND POTASSIUM PHOSPHATE, DIBASIC 20 MMOL: 224; 236 INJECTION, SOLUTION INTRAVENOUS at 11:11

## 2018-08-06 RX ADMIN — ASPIRIN 81 MG 81 MG: 81 TABLET ORAL at 09:22

## 2018-08-06 RX ADMIN — MUPIROCIN: 20 OINTMENT TOPICAL at 21:02

## 2018-08-06 RX ADMIN — OXYCODONE HYDROCHLORIDE AND ACETAMINOPHEN 500 MG: 500 TABLET ORAL at 09:22

## 2018-08-06 RX ADMIN — IPRATROPIUM BROMIDE AND ALBUTEROL SULFATE 1 AMPULE: .5; 3 SOLUTION RESPIRATORY (INHALATION) at 20:22

## 2018-08-06 RX ADMIN — SODIUM CHLORIDE, PRESERVATIVE FREE 10 ML: 5 INJECTION INTRAVENOUS at 21:01

## 2018-08-06 RX ADMIN — DOCUSATE SODIUM 100 MG: 100 CAPSULE, LIQUID FILLED ORAL at 09:22

## 2018-08-06 RX ADMIN — PIPERACILLIN AND TAZOBACTAM 3.38 G: 3; .375 INJECTION, POWDER, FOR SOLUTION INTRAVENOUS at 22:34

## 2018-08-06 RX ADMIN — SODIUM CHLORIDE, PRESERVATIVE FREE 10 ML: 5 INJECTION INTRAVENOUS at 09:28

## 2018-08-06 RX ADMIN — ATORVASTATIN CALCIUM 40 MG: 40 TABLET, FILM COATED ORAL at 20:59

## 2018-08-06 RX ADMIN — LATANOPROST 1 DROP: 50 SOLUTION OPHTHALMIC at 21:02

## 2018-08-06 RX ADMIN — IPRATROPIUM BROMIDE AND ALBUTEROL SULFATE 1 AMPULE: .5; 3 SOLUTION RESPIRATORY (INHALATION) at 08:45

## 2018-08-06 RX ADMIN — MUPIROCIN: 20 OINTMENT TOPICAL at 10:23

## 2018-08-06 RX ADMIN — IPRATROPIUM BROMIDE AND ALBUTEROL SULFATE 1 AMPULE: .5; 3 SOLUTION RESPIRATORY (INHALATION) at 16:25

## 2018-08-06 RX ADMIN — SODIUM CHLORIDE, SODIUM GLUCONATE, SODIUM ACETATE, POTASSIUM CHLORIDE AND MAGNESIUM CHLORIDE 100 ML/HR: 526; 502; 368; 37; 30 INJECTION, SOLUTION INTRAVENOUS at 06:00

## 2018-08-06 RX ADMIN — PANTOPRAZOLE SODIUM 40 MG: 40 TABLET, DELAYED RELEASE ORAL at 09:22

## 2018-08-06 RX ADMIN — MEROPENEM 500 MG: 500 INJECTION, POWDER, FOR SOLUTION INTRAVENOUS at 05:45

## 2018-08-06 RX ADMIN — SENNOSIDES 8.6 MG: 8.6 TABLET, FILM COATED ORAL at 20:59

## 2018-08-06 RX ADMIN — APIXABAN 5 MG: 5 TABLET, FILM COATED ORAL at 20:59

## 2018-08-06 RX ADMIN — DIBASIC SODIUM PHOSPHATE, MONOBASIC POTASSIUM PHOSPHATE AND MONOBASIC SODIUM PHOSPHATE 250 MG: 852; 155; 130 TABLET ORAL at 20:59

## 2018-08-06 RX ADMIN — Medication 1 CAPSULE: at 17:14

## 2018-08-06 RX ADMIN — IPRATROPIUM BROMIDE AND ALBUTEROL SULFATE 1 AMPULE: .5; 3 SOLUTION RESPIRATORY (INHALATION) at 12:38

## 2018-08-06 RX ADMIN — DORZOLAMIDE HYDROCHLORIDE AND TIMOLOL MALEATE 1 DROP: 20; 5 SOLUTION/ DROPS OPHTHALMIC at 21:02

## 2018-08-06 RX ADMIN — Medication 1 CAPSULE: at 09:22

## 2018-08-06 RX ADMIN — PIPERACILLIN AND TAZOBACTAM 3.38 G: 3; .375 INJECTION, POWDER, FOR SOLUTION INTRAVENOUS at 17:03

## 2018-08-06 RX ADMIN — FERROUS SULFATE TAB 325 MG (65 MG ELEMENTAL FE) 325 MG: 325 (65 FE) TAB at 09:23

## 2018-08-06 RX ADMIN — BRIMONIDINE TARTRATE 1 DROP: 2 SOLUTION OPHTHALMIC at 09:25

## 2018-08-06 RX ADMIN — SENNOSIDES 8.6 MG: 8.6 TABLET, FILM COATED ORAL at 09:22

## 2018-08-06 RX ADMIN — APIXABAN 5 MG: 5 TABLET, FILM COATED ORAL at 09:22

## 2018-08-06 RX ADMIN — BRIMONIDINE TARTRATE 1 DROP: 2 SOLUTION OPHTHALMIC at 21:02

## 2018-08-06 ASSESSMENT — PULMONARY FUNCTION TESTS
PIF_VALUE: 25
PIF_VALUE: 22
PIF_VALUE: 27
PIF_VALUE: 20
PIF_VALUE: 25
PIF_VALUE: 22

## 2018-08-06 NOTE — FLOWSHEET NOTE
Levophed decreased to 8 mcg/min and Propofol restarted at 10 mcg/kg/min d/t increased agitation. Will continue to monitor closely.        08/05/18 2046   Vitals   Pulse 79   Heart Rate Source Monitor   Resp 23   Art Line   ABP (Arterial line BP) 110/56   ABP mean (Arterial line mean) 78 mmHg   Arterial Line Location Right radial   Art Line Wave Form Appropriate wave forms   Oxygen Therapy   SpO2 98 %   FiO2  30 %   End Tidal CO2 30 (%)   Vent Information   PEEP/CPAP 5   Vt Ordered 450 mL   Rate Set 14 bmp   Peak Flow 40 L/min   Pressure Support 0 cmH20   Sensitivity 3

## 2018-08-06 NOTE — PROGRESS NOTES
08/05/18 2039   Vent Information   Vt Ordered 450 mL   Rate Set 14 bmp   Peak Flow 40 L/min   Pressure Support 0 cmH20   FiO2  30 %   Sensitivity 3   PEEP/CPAP 5   Vent Patient Data   Vt Exhaled 439 mL   Rate Measured 17 br/min   Minute Volume 7.03 Liters   Peak Inspiratory Pressure 20 cmH2O   I:E Ratio 1:2.50   Mean Airway Pressure 11 cmH20   Plateau Pressure 16 JFT77   Static Compliance 39 mL/cmH2O   Dynamic Compliance 29 mL/cmH2O   Spontaneous Breathing Trial (SBT) RT Doc   Pulse 72   SpO2 97 %   Breath Sounds   Right Upper Lobe Diminished   Right Middle Lobe Diminished   Right Lower Lobe Diminished   Left Upper Lobe Diminished   Left Lower Lobe Diminished   Additional Respiratory  Assessments   Resp 19   End Tidal CO2 9 (%)   Position Semi-Correa's   Alarm Settings   High Pressure Alarm 60 cmH2O   Low Minute Volume Alarm 2 L/min   High Respiratory Rate 40 br/min   Patient Observation   Observations (ambu @ bedside)   ETT (adult)   Placement Date/Time: 08/03/18 1305   Preoxygenation: Yes  Mask Ventilation: Mask ventilation not attempted (0)  Technique: Direct laryngoscopy;Video laryngoscopy  Type: Cuffed  Tube Size: 7 mm  Laryngoscope: Mac  Blade Size: 4  Location: Oral  Grade V...    Secured at 24 cm   Measured From Lips   ET Placement Left   Secured By Commercial tube boyd   Site Condition Dry

## 2018-08-06 NOTE — CONSULTS
Dysphagia; GERD (gastroesophageal reflux disease); Glaucoma; Hyperlipidemia; Hypertension; MDRO (multiple drug resistant organisms) resistance; Obstructive and reflux uropathy; Stricture of ureter; Suprapubic catheter ([address] Urinary retention; and UTI (urinary tract infection). Surgical History:   has a past surgical history that includes pr cystoscopy,insert ureteral stent (Right, 8/3/2018) [facility]N/A, 8/4/2018). Social History:   reports that he has never smoked. He has never used smokeless tobacco. He reports that he does not drink alcohol or use drugs. Family History:  No evidence for sudden cardiac death or premature CAD    Medications:  Reviewed and are listed in nursing record. and/or listed below  Outpatient Medications:  Prior to Admission medications    Medication Sig Start Date End Date Taking?  Authorizing Provider   apixaban (ELIQUIS) 5 MG TABS tablet Take 2 tablets by mouth 2 times daily 6/18/15   Kyra Perdomo MD   apixaban Kristie Brown) 5 MG TABS tablet Take 1 tablet by mouth 2 times daily 6/24/15   Kyra Perdomo MD   ferrous sulfate 325 (65 FE) MG EC tablet Take 1 tablet by mouth daily (with breakfast) 6/18/15   Kyar Perdomo MD   latanoprost (XALATAN) 0.005 % ophthalmic solution   Place 1 drop into both eyes nightly     Historical Provider, MD   brimonidine (ALPHAGAN) 0.2 % ophthalmic solution Place 1 drop into both eyes 2 times daily    Historical Provider, MD   omeprazole (PRILOSEC) 20 MG capsule Take 20 mg by mouth daily    Historical Provider, MD   furosemide (LASIX) 20 MG tablet Take 20 mg by mouth daily    Historical Provider, MD   tamsulosin (FLOMAX) 0.4 MG capsule Take 0.4 mg by mouth nightly    Historical Provider, MD   potassium chloride SA (K-DUR;KLOR-CON M) 20 MEQ tablet Take 20 mEq by mouth 2 times daily    Historical Provider, MD   docusate sodium (COLACE) 100 MG capsule Take 100 mg by mouth 2 times daily    Historical Provider, MD   senna (SENOKOT) 8.6 MG tablet Take 1 tablet by mouth 2 times daily    Historical Provider, MD   hydrALAZINE (APRESOLINE) 25 MG tablet Take 25 mg by mouth 3 times daily    Historical Provider, MD   dorzolamide-timolol (COSOPT) 22.3-6.8 MG/ML ophthalmic solution 1 drop 2 times daily    Historical Provider, MD   amLODIPine (NORVASC) 5 MG tablet Take 5 mg by mouth daily Hold if SBP <110    Historical Provider, MD   bisacodyl (BISAC-EVAC) 10 MG suppository Place 10 mg rectally daily as needed for Constipation    Historical Provider, MD   Lactobacillus (ACIDOPHILUS) CAPS capsule Take 1 capsule by mouth 2 times daily    Historical Provider, MD   Multiple Vitamins-Minerals (STRESS FORMULA 500/ZINC) TABS Take 1 tablet by mouth daily    Historical Provider, MD   menthol-zinc oxide (RISAMINE) 0.44-20.625 % OINT ointment Apply topically daily Max 30 ml per day.     Historical Provider, MD   silver sulfADIAZINE (SILVADENE) 1 % cream Apply topically daily    Historical Provider, MD   ascorbic acid (VITAMIN C) 500 MG tablet Take 500 mg by mouth daily    Historical Provider, MD   acetaminophen (TYLENOL) 500 MG tablet Take 500 mg by mouth every 6 hours as needed for Pain    Historical Provider, MD       In-patient schedule medications:   pantoprazole  40 mg Intravenous Daily    And    sodium chloride flush  10 mL Intravenous Daily    mupirocin   Nasal BID    ferrous sulfate  300 mg Oral Daily    potassium phosphate IVPB  20 mmol Intravenous Once    piperacillin-tazobactam  3.375 g Intravenous Q8H    aspirin  81 mg Oral Daily    atorvastatin  40 mg Oral Nightly    ipratropium-albuterol  1 ampule Inhalation Q4H WA    apixaban  5 mg Oral BID    ascorbic acid  500 mg Oral Daily    brimonidine  1 drop Both Eyes BID    docusate sodium  100 mg Oral BID    dorzolamide-timolol  1 drop Both Eyes BID    latanoprost  1 drop Both Eyes Nightly    senna  1 tablet Oral BID    sodium chloride flush  10 mL Intravenous 2 times per day    lactobacillus  1 capsule Oral BID WC         Infusion Medications:   propofol Stopped (08/06/18 1230)    norepinephrine 4 mcg/min (08/06/18 0930)         Allergies:  Patient has no known allergies. Review of Systems:   14 point review of systems unable to be completed due to patient condition/cooperation. All available positives mentioned in history of present illness.      Physical Examination:    [unfilled]  /69   Pulse 79   Temp 97.6 °F (36.4 °C) (Axillary)   Resp 9   Ht 5' 9\" (1.753 m)   Wt 180 lb 1.9 oz (81.7 kg)   SpO2 99%   BMI 26.60 kg/m²    Weight: 180 lb 1.9 oz (81.7 kg)     Wt Readings from Last 3 Encounters:   08/06/18 180 lb 1.9 oz (81.7 kg)   06/09/15 134 lb 7.7 oz (61 kg)       Intake/Output Summary (Last 24 hours) at 08/06/18 1451  Last data filed at 08/06/18 1400   Gross per 24 hour   Intake             5922 ml   Output              935 ml   Net             4987 ml       General Appearance:  unresponsive distress, appears younger than stated age   Head:  Normocephalic, without obvious abnormality, atraumatic   Eyes:  PERRL, conjunctiva/corneas clear       Nose: Nares normal, no drainage or sinus tenderness   Throat: Lips, mucosa, and tongue normal   Neck: Supple, symmetrical, trachea midline, no adenopathy, thyroid: not enlarged, symmetric, no tenderness/mass/nodules, no carotid bruit or JVD       Lungs:   Clear to auscultation bilaterally, respirations unlabored   Chest Wall:  No tenderness or deformity   Heart:  Regular rhythm and normal rate; S1, S2 are normal; no murmur noted; no rub or gallop   Abdomen:   Soft, non-tender, bowel sounds active all four quadrants,  no masses, no organomegaly           Extremities: Extremities normal, atraumatic, no cyanosis or edema   Pulses: 2+ and symmetric   Skin: Skin color, texture, turgor normal, no rashes or lesions   Pysch: unresponsive   Neurologic: unresponsive        Labs  Recent Labs      08/04/18   0530  08/06/18   0632   WBC  27.2* 18.0*   HGB  10.3*  10.4*   HCT  29.9*  31.6*   MCV  90.8  91.4   PLT  103*  77*     Recent Labs      08/05/18   0530  08/06/18   0632   CREATININE  1.1  1.0   BUN  14  13   NA  138  141   K  3.4*  3.2*   CL  101  105   CO2  31  28     No results for input(s): INR, PROTIME in the last 72 hours. Recent Labs      08/05/18   1340   TROPONINI  0.12*     Invalid input(s): PRO-BNP  No results for input(s): CHOL, HDL in the last 72 hours. Invalid input(s): LDL, TG      Imaging:  I have reviewed the below testing personally and my interpretation is below. EKG: Sinus rhythm with 1st degree A-V blockLow voltage QRSAbnormal ECGWhen compared with ECG of 02-AUG-2018 11:29,Significant changes have occurredConfirmed by Reed Espinoza MD, Kayla Fraire ([id number]) on 8/5/2018 8:57:13 PM  CXR:      Assessment:  80 y.o. patient with:  Problem List Items Addressed This Visit     Sepsis ([facility] [address] - Primary    Complicated UTI (urinary tract infection)    Elevated troponin    Altered mental status    Acute renal failure ([facility] [address]          Plan:  1. Echocardiogram  2. Elevated troponin likely from demand ischemia and medical issues  3. Not candidate for cath lab due to depressed mental status, thrombocytopenia, and sepsis    All questions and concerns were addressed to the patient/family. Alternatives to my treatment were discussed. The note was completed using EMR. Every effort was made to ensure accuracy; however, inadvertent computerized transcription errors may be present.     Clarise Essex, MD, [id number], [place], [place]  [phone] [facility] office  [phone] Main central  8/6/2018  2:51 PM

## 2018-08-06 NOTE — PROGRESS NOTES
Hospitalist Progress Note      PCP: Ace Black    Date of Admission: 8/2/2018    Chief Complaint: unresponsive     Hospital Course:   admitted with septic shock, high grade fever, acute kidney injury (KDIGO stage 3), acute encephalopathy required  multiple pressors and mechanical ventilation 30% FiO2 AC/VC.   CT abd showed obstructing stone.  Had cystoscopy and right ureteral stent (Dr. Delta Garcia) to relieve obstruction of right kidney. Blood cultures are positive for E coli and Pseudomonas, UC with GNR. Prior history of UTI with bacteremia, E coli sensitive to cefepime.      Subjective: Intubated and sedated. Now on only one pressor which is being weaned. S/p bronch today.       Medications:  Reviewed    Infusion Medications    propofol Stopped (08/06/18 1230)    norepinephrine 4 mcg/min (08/06/18 0930)     Scheduled Medications    pantoprazole  40 mg Intravenous Daily    And    sodium chloride flush  10 mL Intravenous Daily    mupirocin   Nasal BID    ferrous sulfate  300 mg Oral Daily    potassium phosphate IVPB  20 mmol Intravenous Once    piperacillin-tazobactam  3.375 g Intravenous Q8H    aspirin  81 mg Oral Daily    atorvastatin  40 mg Oral Nightly    ipratropium-albuterol  1 ampule Inhalation Q4H WA    apixaban  5 mg Oral BID    ascorbic acid  500 mg Oral Daily    brimonidine  1 drop Both Eyes BID    docusate sodium  100 mg Oral BID    dorzolamide-timolol  1 drop Both Eyes BID    latanoprost  1 drop Both Eyes Nightly    senna  1 tablet Oral BID    sodium chloride flush  10 mL Intravenous 2 times per day    lactobacillus  1 capsule Oral BID WC     PRN Meds: potassium chloride, sodium chloride flush, magnesium hydroxide, ondansetron      Intake/Output Summary (Last 24 hours) at 08/06/18 1459  Last data filed at 08/06/18 1400   Gross per 24 hour   Intake             4436 ml   Output              935 ml   Net             3501 ml       Physical Exam Performed:    /69   Pulse 79 Temp 97.6 °F (36.4 °C) (Axillary)   Resp 9   Ht 5' 9\" (1.753 m)   Wt 180 lb 1.9 oz (81.7 kg)   SpO2 99%   BMI 26.60 kg/m²     General appearance:intubated, sedated   HEENT: Pupils equal, round, Conjunctivae/corneas clear. Neck: Supple, with full range of motion. No jugular venous distention. Trachea midline. Respiratory:  Coarse vent breath sounds anteriorly Cardiovascular: Regular rate and rhythm with normal S1/S2 without murmurs, rubs or gallops. Abdomen: Soft, non-tender, non-distended with normal bowel sounds. Musculoskeletal: No clubbing, cyanosis or edema bilaterally. Skin: Skin color, texture, turgor normal.  No rashes or lesions on exposed skin . Neurologic:  Sedated . Not following commands   Psychiatric: unable to assess   Capillary Refill: Brisk,< 3 seconds   Peripheral Pulses: +2 palpable, equal bilaterally       Labs:   Recent Labs      08/03/18   1806  08/04/18   0530  08/06/18   0632   WBC  31.8*  27.2*  18.0*   HGB  10.5*  10.3*  10.4*   HCT  30.9*  29.9*  31.6*   PLT  130*  103*  77*     Recent Labs      08/05/18   0015  08/05/18   0530  08/06/18   0632   NA  137  138  141   K  3.5  3.4*  3.2*   CL  100  101  105   CO2  29  31  28   BUN  16  14  13   CREATININE  1.2  1.1  1.0   CALCIUM  7.2*  7.3*  7.5*   PHOS  1.2*  1.1*  1.8*     Recent Labs      08/04/18   0530   AST  290*   ALT  335*   BILITOT  0.4   ALKPHOS  110     No results for input(s): INR in the last 72 hours. Recent Labs      08/05/18   1340   TROPONINI  0.12*       Urinalysis:      Lab Results   Component Value Date    NITRU POSITIVE 08/02/2018    WBCUA >100 08/02/2018    BACTERIA 1+ 08/02/2018    RBCUA  08/02/2018    BLOODU MODERATE 08/02/2018    SPECGRAV 1.010 08/02/2018    GLUCOSEU Negative 08/02/2018       Radiology:  XR CHEST PORTABLE   Final Result   Right IJ catheter has been pulled back with tip above the clavicle. Recommend repositioning. Some improvement in aeration of the lungs.          XR CHEST

## 2018-08-06 NOTE — PROGRESS NOTES
08/06/18 0849   Vent Information   Vent Type 840   Vent Mode PS  (switched by Dr. Oneyda Orozco. RT unaware.)   Vt Ordered 450 mL   Peak Flow 60 L/min   Pressure Support 15 cmH20   FiO2  30 %   Sensitivity 4   PEEP/CPAP 5   Cuff Pressure (cm H2O) 30 cm H2O   Humidification Source HME   Vent Patient Data   Vt Exhaled 243 mL   Rate Measured 31 br/min   Minute Volume 7.88 Liters   Peak Inspiratory Pressure 20 cmH2O   I:E Ratio 1:1.60   Mean Airway Pressure 12 cmH20   Plateau Pressure (unbale to obtain)   Spontaneous Breathing Trial (SBT) RT Doc   Pulse 72   SpO2 100 %   Cough/Sputum   Sputum How Obtained Suctioned;Endotracheal   Cough Productive   Sputum Amount Large   Sputum Color Creamy; Yellow   Tenacity Thick; Thin   Breath Sounds   Right Upper Lobe Rhonchi   Right Middle Lobe Diminished   Right Lower Lobe Diminished   Left Upper Lobe Rhonchi   Left Lower Lobe Diminished   Additional Respiratory  Assessments   Resp 9   End Tidal CO2 19 (%)   Alarm Settings   High Pressure Alarm 45 cmH2O   Low Minute Volume Alarm 3 L/min   Apnea (secs) 20 secs   High Respiratory Rate 40 br/min   Low Exhaled Vt  300 mL   Patient Observation   Observations ambu @ bedside   ETT (adult)   Placement Date/Time: 08/03/18 1305   Preoxygenation: Yes  Mask Ventilation: Mask ventilation not attempted (0)  Technique: Direct laryngoscopy;Video laryngoscopy  Type: Cuffed  Tube Size: 7 mm  Laryngoscope: Mac  Blade Size: 4  Location: Oral  Grade V...    Secured at 24 cm   Measured From [address]  (moved to the left at this time.)   Secured By Commercial tube boyd   Site Condition Dry

## 2018-08-06 NOTE — PROGRESS NOTES
>2 mcg/mL   gentamicin Sensitive <=4 mcg/mL   meropenem Sensitive <=1 mcg/mL   nitrofurantoin Sensitive <=32 mcg/mL   piperacillin-tazobactam Sensitive <=16 mcg/mL   trimethoprim-sulfamethoxazole Resistant >2/38 mcg/mL         PSEUDOMONAS AERUGINOSA   Antibiotic Interpretation [facility] Unit   cefepime Sensitive <=2 mcg/mL   ciprofloxacin Sensitive <=1 mcg/mL   gentamicin Sensitive <=4 mcg/mL   meropenem Sensitive <=1 mcg/mL   piperacillin-tazobactam Sensitive <=16 mcg/mL   tobramycin Sensitive <=4 mcg/mL        8/4 BC x1 NGTD   Resp cx with normal respiratory nimo       IMAGING:  CT abd 8/2:  Impression   1. 4 mm obstructing nephrolithiasis within the right proximal ureter causing   moderate hydronephrosis. 2. Bilateral lower lobe pneumonia.  Recommend chest radiograph in 8 weeks to   confirm resolution. 3. Mildly dilated appendix without periappendiceal inflammation can be seen   in the setting of early acute appendicitis. 4. Unchanged 2.9 cm pancreatic cystic lesion. 5. Fecal impaction. CXR 8/6:  FINDINGS:   Endotracheal tube with tip approximately 4.9 cm from the rylee.  NG tube   courses below the diaphragm.  Right IJ catheter has been pulled back with tip   projecting above the clavicle.  Slight improvement in aeration of the mid and   upper lungs.  No substantial change otherwise.        Assessment:     Patient Active Problem List    Diagnosis Date Noted    Bacteremia due to Pseudomonas 08/03/2018    Pulmonary infiltrates 08/03/2018    Nephrolithiasis 08/03/2018    Hydronephrosis with urinary obstruction due to renal calculus 08/03/2018    Fecal impaction ([address] 08/03/2018    Pancreatic cyst 08/03/2018    Acute respiratory insufficiency, postoperative 08/03/2018    Acute renal failure ([address]     Septic shock ([address] 08/02/2018    Pyuria 08/02/2018    GONSALO (acute kidney injury) ([address] 08/02/2018    Lactic acidosis 08/02/2018    Elevated troponin 08/02/2018    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   

## 2018-08-06 NOTE — PROGRESS NOTES
4 Eyes Skin Assessment     The patient is being assess for   Shift Handoff    I agree that 2 RN's have performed a thorough Head to Toe Skin Assessment on the patient. ALL assessment sites listed below have been assessed. Areas assessed by both nurses:   [x]   Head, Face, and Ears   [x]   Shoulders, Back, and Chest, Abdomen  [x]   Arms, Elbows, and Hands   [x]   Coccyx, Sacrum, and Ischium  [x]   Legs, Feet, and Heels      Co-signer eSignature: {Esignature:[id number]}    Does the Patient have Skin Breakdown?   No          Naldo Prevention initiated:  Yes   Wound Care Orders initiated:  No      WOC nurse consulted for Pressure Injury (Stage 3,4, Unstageable, DTI, NWPT, Complex wounds)and New or Established Ostomies:  No      Primary Nurse eSignature: Electronically signed by Earl Thornton RN on 8/6/18 at 8:03 AM

## 2018-08-06 NOTE — PROGRESS NOTES
Progress Note    HISTORY     CC:  AMS      Subjective/     HPI:  Remains in the ICU in critical condition. On IV fluids. Good urine output. He remains on vent with unchanged settings but limited neurologic response. Kidney function is recovering nicely     ROS:  Constitutional:  No fevers,  Respiratory:  No wheezing, On vent   Skin:  No rash  :  Chaidez, urine output is good. Clear color     Social Hx:    - No family at bedside     Past Medical and Surgical History:  - Reviewed, no changes     EXAM       Objective/     Vitals:    08/06/18 0845 08/06/18 0849 08/06/18 0900 08/06/18 1000   BP:   104/64 105/75   Pulse:  72 74 67   Resp: 9 9 15 20   Temp:       TempSrc:       SpO2: 97% 100% 98% 95%   Weight:       Height:         24HR INTAKE/OUTPUT:      Intake/Output Summary (Last 24 hours) at 08/06/18 1041  Last data filed at 08/06/18 1030   Gross per 24 hour   Intake             4752 ml   Output             1855 ml   Net             2897 ml     Constitutional:  Critically ill in the ICU  Eyes:  Pupils reactive, sclera clear   Neck:  Normal thyroid, no masses   Cardiovascular:  Regular, no rub  Respiratory:   On vent, no wheezing  Psychiatry:  Sedated   Abdomen: +bs, soft, nt, no masses   Musculoskeletal: trace LE edema, no clubbing   Lymphatics:  No LAD in neck, no supraclavicular nodes   :  Chaidez       MEDICAL DECISION MAKING       Data/  Recent Labs      08/03/18   1806  08/04/18   0530  08/06/18   0632   WBC  31.8*  27.2*  18.0*   HGB  10.5*  10.3*  10.4*   HCT  30.9*  29.9*  31.6*   MCV  91.7  90.8  91.4   PLT  130*  103*  77*     Recent Labs      08/05/18   0015  08/05/18   0530  08/06/18   0632   NA  137  138  141   K  3.5  3.4*  3.2*   CL  100  101  105   CO2  29  31  28   GLUCOSE  116*  95  102*   PHOS  1.2*  1.1*  1.8*   MG   --   2.10   --    BUN  16  14  13   CREATININE  1.2  1.1  1.0   LABGLOM  57*  >60  >60   GFRAA  >60  >60  >60       Assessment/        Acute Kidney Injury:  KDIGO

## 2018-08-06 NOTE — PROGRESS NOTES
08/06/18 1627   Vent Information   Vent Type 840   Vent Mode PS   Vt Ordered 450 mL   Peak Flow 60 L/min   Pressure Support 20 cmH20   FiO2  50 %   Sensitivity 4   PEEP/CPAP 5   Cuff Pressure (cm H2O) 30 cm H2O   Vent Patient Data   Vt Exhaled 411 mL   Rate Measured 24 br/min   Minute Volume 8.9 Liters   Peak Inspiratory Pressure 25 cmH2O   I:E Ratio 1:2.50   Mean Airway Pressure 12 cmH20   Plateau Pressure (unable to obtain)   Spontaneous Breathing Trial (SBT) RT Doc   Pulse 81   SpO2 99 %   Cough/Sputum   Sputum How Obtained Suctioned;Endotracheal   Cough Productive   Sputum Amount Moderate   Sputum Color Cloudy;Creamy; Yellow   Tenacity Thin   Breath Sounds   Right Upper Lobe Rhonchi   Right Middle Lobe Diminished   Right Lower Lobe Diminished   Left Upper Lobe Rhonchi   Left Lower Lobe Diminished   Additional Respiratory  Assessments   Resp 23   End Tidal CO2 27 (%)   Alarm Settings   High Pressure Alarm 55 cmH2O   Low Minute Volume Alarm 2 L/min   Apnea (secs) 20 secs   High Respiratory Rate 40 br/min   Low Exhaled Vt  300 mL   Patient Observation   Observations ambu @ bedside   ETT (adult)   Placement Date/Time: 08/03/18 1305   Preoxygenation: Yes  Mask Ventilation: Mask ventilation not attempted (0)  Technique: Direct laryngoscopy;Video laryngoscopy  Type: Cuffed  Tube Size: 7 mm  Laryngoscope: Mac  Blade Size: 4  Location: Oral  Grade V...    Secured at 24 cm   Measured From Lips   ET Placement Left  (moved to the right at this time.)   Secured By Commercial tube boyd   Site Condition Dry

## 2018-08-06 NOTE — PROGRESS NOTES
Propofol restarted at 10 mcg/kg/min due to the patient having increased agitation and pulling at the ETT. Will continue to monitor closely.

## 2018-08-06 NOTE — PROGRESS NOTES
Nutrition Assessment    Type and Reason for Visit: Reassess, Consult (tube feeding ordering and management)    Nutrition Recommendations:   1. Jevity 1.2 increase by 10-15 ml every 4 hours to goal rate of 65 ml/hr  2.  Water flush 30 ml every 4 hours  3. Will monitor TF intake and tolerance, blood sugar trends, and fluid and electrolyte balances    Malnutrition Assessment:  · Malnutrition Status: At risk for malnutrition  · Context: Chronic illness  · Findings of the 6 clinical characteristics of malnutrition (Minimum of 2 out of 6 clinical characteristics is required to make the diagnosis of moderate or severe Protein Calorie Malnutrition based on AND/ASPEN Guidelines):  1. Energy Intake-Less than or equal to 75%,  (since admission)    2. Weight Loss-Unable to assess (critical condition-weight fluctuating ),    3. Fat Loss-Unable to assess,    4. Muscle Loss-Unable to assess,    5. Fluid Accumulation-Unable to assess,    6.  Strength-Not measured    Nutrition Diagnosis:   · Problem: Inadequate oral intake  · Etiology: related to Insufficient energy/nutrient consumption     Signs and symptoms:  as evidenced by NPO status due to medical condition    Nutrition Assessment:  · Subjective Assessment: Follow up:  Tube feeding started trophically yesterday and increased to 20 ml/hr. Nurse awaiting goal rate recommendations. K and Phos being replaced today via IVPB.     · Nutrition-Focused Physical Findings: generalized edema  · Wound Type: None  · Current Nutrition Therapies:  · Oral Diet Orders: NPO   · Oral Diet intake: NPO  · Oral Nutrition Supplement (ONS) Orders: None  · ONS intake: NPO  · Tube Feeding (TF) Orders:   · Feeding Route: Orogastric  · Formula: Standard w/Fiber  · Rate (ml/hr):20 ml/hr    · Volume (ml/day):    · Duration: Continuous 24hrs  · Additives/Modulars:    · TF Residuals: Not applicable  · Water Flushes: None  · Current TF & Flush Orders Provides: titrating up  · Goal TF & Flush Orders Provides: Jevity 1.2 continue at 20 ml/hr increase by 10-15 ml every 4 hours to goal rate of 65 ml/hr providing 1872 calories, 87 grams of protein, 1265 ml free water + water flushes 30 ml every 4 hours  · Additional Calories:    · Anthropometric Measures:  · Ht: 5' 9\" (175.3 cm)   · Current Body Wt: 180 lb 1 oz (81.7 kg)  · Ideal Body Wt: 160 lb (72.6 kg), % Ideal Body    · BMI Classification: BMI 25.0 - 29.9 Overweight  · Comparative Standards (Estimated Nutrition Needs):  · Estimated Daily Total Kcal: 7441-7406  · Estimated Daily Protein (g):   · Estimated Daily Fluid (ml/day): 4615-5621    Estimated Intake vs Estimated Needs: Intake Less Than Needs    Nutrition Risk Level: High    Nutrition Interventions:   Modify current Tube Feeding  Continued Inpatient Monitoring    Nutrition Evaluation:   · Evaluation: Goals set   · Goals: Patient will tolerate enteral nutrition without GI distress or aspiration. · Monitoring: TF Intake, TF Tolerance, Comparative Standards    See Adult Nutrition Doc Flowsheet for more detail.      Electronically signed by Sofia Conroy, [address],  on 8/6/18 at 12:14 PM    Contact Number: [phone]

## 2018-08-06 NOTE — PROGRESS NOTES
INPATIENT PULMONARY CRITICAL CARE PROGRESS NOTE      Reason for visit    Septic shock    SUBJECTIVE:  Patient continues to be critically ill on mechanical ventilatory support;patient continues to have large amounts of thick mucus plugs in ETT which are difficult to suction as per RT; Patient is now afebrile, patient continues to be  IV propofol to maintain patient ventilator synchrony which is being held this morning ;patient continues to be on IV pressors to maintain hemodynamically; , patient urine output is improving but patient's cumulative fluid balance is +13.8 L patient has normal sinus rhythm on the monitor, patient was started on trophic feeds last night;patient's blood sugars are trending lower  no other pertinent ROS could be obtained       Physical Exam:  Blood pressure 107/71, pulse 81, temperature 97.6 °F (36.4 °C), temperature source Axillary, resp. rate 23, height 5' 9\" (1.753 m), weight 180 lb 1.9 oz (81.7 kg), SpO2 99 %.'   Constitutional:  No acute distress  On mechanical vent support. HENT:  Oropharynx is clear and moist. No thyromegaly. Eyes:  Conjunctivae are normal. Pupils equal, round, and reactive to light. No scleral icterus. Neck: . No tracheal deviation present. No obvious thyroid mass. Cardiovascular: Normal rate, regular rhythm, normal heart sounds. No right ventricular heave. No lower extremity edema. Pulmonary/Chest: No wheezes. Bilateral  rales. Chest wall is not dull to percussion. No accessory muscle usage or stridor. Decreased breath sound intensity   Abdominal: Soft. Bowel sounds present. No distension or hernia. No tenderness. Musculoskeletal: No cyanosis. No clubbing. No obvious joint deformity. Lymphadenopathy: No cervical or supraclavicular adenopathy.    Skin: Flaky skin lower extremities which is better   Neurologic: sedated         Results:  CBC:   Recent Labs      08/03/18   1806  08/04/18   0530  08/06/18   0632   WBC  31.8*  27.2*  18.0*   HGB  10.5* [id number]) as of 8/6/2018 17:36   Ref. Range 8/5/2018 05:30 8/5/2018 11:34 8/5/2018 17:57 8/6/2018 06:32   Sodium Latest Ref Range: 136 - 145 mmol/L 138   141   Potassium Latest Ref Range: 3.5 - 5.1 mmol/L 3.4 (L)   3.2 (L)   Chloride Latest Ref Range: 99 - 110 mmol/L 101   105   CO2 Latest Ref Range: 21 - 32 mmol/L 31   28   BUN Latest Ref Range: 7 - 20 mg/dL 14   13   Creatinine Latest Ref Range: 0.8 - 1.3 mg/dL 1.1   1.0   Anion Gap Latest Ref Range: 3 - 16  6   8   Calcium, Ion Latest Ref Range: 1.12 - 1.32 mmol/L  0.97 (L)     GFR Non- Latest Ref Range: >60  >60   >60   GFR  Latest Ref Range: >60  >60   >60   Magnesium Latest Ref Range: 1.80 - 2.40 mg/dL 2.10      Lactic Acid Latest Ref Range: 0.4 - 2.0 mmol/L 2.4 (H) 2.0 2.1 (H)    Glucose Latest Ref Range: 70 - 99 mg/dL 95   102 (H)   Calcium Latest Ref Range: 8.3 - 10.6 mg/dL 7.3 (L)   7.5 (L)   Phosphorus Latest Ref Range: 2.5 - 4.9 mg/dL 1.1 (L)   1.8 (L)     Results for Debby Loop (MRN [id number]) as of 8/6/2018 17:36   Ref. Range 8/4/2018 11:58 8/4/2018 17:21 8/5/2018 00:15 8/5/2018 05:30 8/6/2018 06:32   Albumin Latest Ref Range: 3.4 - 5.0 g/dL 1.9 (L) 1.9 (L) 1.8 (L) 2.0 (L) 1.9 (L)     Results for Debby Loop (MRN [id number]) as of 8/6/2018 17:36   Ref.  Range 8/2/2018 11:37 8/3/2018 04:43 8/3/2018 18:06 8/4/2018 05:30 8/6/2018 06:32   WBC Latest Ref Range: 4.0 - 11.0 K/uL 3.3 (L) 24.2 (H) 31.8 (H) 27.2 (H) 18.0 (H)   RBC Latest Ref Range: 4.20 - 5.90 M/uL 4.57 3.52 (L) 3.37 (L) 3.29 (L) 3.45 (L)   Hemoglobin Quant Latest Ref Range: 13.5 - 17.5 g/dL 14.1 10.7 (L) 10.5 (L) 10.3 (L) 10.4 (L)   Hematocrit Latest Ref Range: 40.5 - 52.5 % 42.7 32.9 (L) 30.9 (L) 29.9 (L) 31.6 (L)   MCV Latest Ref Range: 80.0 - 100.0 fL 93.3 93.4 91.7 90.8 91.4   MCH Latest Ref Range: 26.0 - 34.0 pg 30.9 30.3 31.0 31.2 30.0   MCHC Latest Ref Range: 31.0 - 36.0 g/dL 33.1 32.4 33.9 34.4 32.8   MPV Latest Ref Range: 5.0 - 10.5 fL 7.7 ([facility] [address]    Lactic acidosis    Elevated troponin    Open-angle glaucoma    Altered mental status    Bacteremia due to Pseudomonas    Pulmonary infiltrates    Nephrolithiasis    Hydronephrosis with urinary obstruction due to renal calculus    Fecal impaction [facility])    Pancreatic cyst    Acute respiratory insufficiency, postoperative    Acute renal failure ([facility] [address]  Resolved Problems:    * No resolved hospital problems.  *          Plan:   · Ventilatory support  to keep saturation more than 90%  · Vent waveforms and settings reviewed and changes made  · IV sedation to maintain patient -ventilator syncrony as per RASS   · Pulmonary toilet-needs Rpt therapeutic bronchoscopy   · Monitor for any hypoventilation  · Iv fluids as per nephrology   · IV pressors to keep MAP >65 mm Hg   · Antibiotics as per ID -Meropenem being changed to Zosyn by ID  · Monitor input output and BMP closely  · Corrected electrolytes on when necessary basis  · Kphos started   · Patient is on Eliquis -dose can be decreased /stoped -IM to decide   · Enteral feeds started -to be titrated as metabolic support   · Eyedrops for glaucoma can be continued  · Monitor I/O and BMP  · PUD prophylaxis     Case discussed with  ICU team      Critical care time spent -35 minutes exclusive of any procedures          Electronically signed by:  Maria R Handley MD    8/6/2018    5:33 PM.

## 2018-08-07 ENCOUNTER — APPOINTMENT (OUTPATIENT)
Dept: CT IMAGING | Age: 83
DRG: 853 | End: 2018-08-07
Payer: COMMERCIAL

## 2018-08-07 ENCOUNTER — APPOINTMENT (OUTPATIENT)
Dept: GENERAL RADIOLOGY | Age: 83
DRG: 853 | End: 2018-08-07
Payer: COMMERCIAL

## 2018-08-07 LAB
A/G RATIO: 0.6 (ref 1.1–2.2)
ALBUMIN SERPL-MCNC: 2 G/DL (ref 3.4–5)
ALP BLD-CCNC: 338 U/L (ref 40–129)
ALT SERPL-CCNC: 226 U/L (ref 10–40)
ANION GAP SERPL CALCULATED.3IONS-SCNC: 8 MMOL/L (ref 3–16)
AST SERPL-CCNC: 177 U/L (ref 15–37)
BASE EXCESS ARTERIAL: 2.6 MMOL/L (ref -3–3)
BASE EXCESS ARTERIAL: 4.6 MMOL/L (ref -3–3)
BASOPHILS ABSOLUTE: 0.4 K/UL (ref 0–0.2)
BASOPHILS RELATIVE PERCENT: 1.9 %
BILIRUB SERPL-MCNC: 2.3 MG/DL (ref 0–1)
BLOOD CULTURE, ROUTINE: ABNORMAL
BUN BLDV-MCNC: 11 MG/DL (ref 7–20)
CALCIUM SERPL-MCNC: 7.8 MG/DL (ref 8.3–10.6)
CARBOXYHEMOGLOBIN ARTERIAL: 0.2 % (ref 0–1.5)
CARBOXYHEMOGLOBIN ARTERIAL: 0.4 % (ref 0–1.5)
CHLORIDE BLD-SCNC: 104 MMOL/L (ref 99–110)
CO2: 29 MMOL/L (ref 21–32)
CREAT SERPL-MCNC: 0.9 MG/DL (ref 0.8–1.3)
CULTURE, BLOOD 2: ABNORMAL
EOSINOPHILS ABSOLUTE: 0 K/UL (ref 0–0.6)
EOSINOPHILS RELATIVE PERCENT: 0.1 %
GFR AFRICAN AMERICAN: >60
GFR NON-AFRICAN AMERICAN: >60
GLOBULIN: 3.6 G/DL
GLUCOSE BLD-MCNC: 92 MG/DL (ref 70–99)
HCO3 ARTERIAL: 25.6 MMOL/L (ref 21–29)
HCO3 ARTERIAL: 27.5 MMOL/L (ref 21–29)
HCT VFR BLD CALC: 33 % (ref 40.5–52.5)
HCT VFR BLD CALC: 34.1 % (ref 40.5–52.5)
HEMOGLOBIN, ART, EXTENDED: 11.7 G/DL (ref 13.5–17.5)
HEMOGLOBIN, ART, EXTENDED: 12.3 G/DL (ref 13.5–17.5)
HEMOGLOBIN: 10.9 G/DL (ref 13.5–17.5)
HEMOGLOBIN: 11.6 G/DL (ref 13.5–17.5)
LACTIC ACID: 1.1 MMOL/L (ref 0.4–2)
LYMPHOCYTES ABSOLUTE: 0.7 K/UL (ref 1–5.1)
LYMPHOCYTES RELATIVE PERCENT: 3.8 %
MAGNESIUM: 2.4 MG/DL (ref 1.8–2.4)
MCH RBC QN AUTO: 29.8 PG (ref 26–34)
MCH RBC QN AUTO: 30.3 PG (ref 26–34)
MCHC RBC AUTO-ENTMCNC: 33.2 G/DL (ref 31–36)
MCHC RBC AUTO-ENTMCNC: 33.9 G/DL (ref 31–36)
MCV RBC AUTO: 89.5 FL (ref 80–100)
MCV RBC AUTO: 89.9 FL (ref 80–100)
METHEMOGLOBIN ARTERIAL: 0.5 %
METHEMOGLOBIN ARTERIAL: 0.7 %
MONOCYTES ABSOLUTE: 1 K/UL (ref 0–1.3)
MONOCYTES RELATIVE PERCENT: 5.1 %
NEUTROPHILS ABSOLUTE: 17.3 K/UL (ref 1.7–7.7)
NEUTROPHILS RELATIVE PERCENT: 89.1 %
O2 CONTENT ARTERIAL: 16 ML/DL
O2 CONTENT ARTERIAL: 16 ML/DL
O2 SAT, ARTERIAL: 95.4 %
O2 SAT, ARTERIAL: 97 %
O2 THERAPY: ABNORMAL
O2 THERAPY: ABNORMAL
ORGANISM: ABNORMAL
PCO2 ARTERIAL: 34.4 MMHG (ref 35–45)
PCO2 ARTERIAL: 34.9 MMHG (ref 35–45)
PDW BLD-RTO: 13.9 % (ref 12.4–15.4)
PDW BLD-RTO: 14.1 % (ref 12.4–15.4)
PH ARTERIAL: 7.49 (ref 7.35–7.45)
PH ARTERIAL: 7.51 (ref 7.35–7.45)
PHOSPHORUS: 1.5 MG/DL (ref 2.5–4.9)
PLATELET # BLD: 78 K/UL (ref 135–450)
PLATELET # BLD: 92 K/UL (ref 135–450)
PMV BLD AUTO: 10.2 FL (ref 5–10.5)
PMV BLD AUTO: 10.4 FL (ref 5–10.5)
PO2 ARTERIAL: 73.6 MMHG (ref 75–108)
PO2 ARTERIAL: 83.2 MMHG (ref 75–108)
POTASSIUM SERPL-SCNC: 2.6 MMOL/L (ref 3.5–5.1)
RBC # BLD: 3.66 M/UL (ref 4.2–5.9)
RBC # BLD: 3.81 M/UL (ref 4.2–5.9)
SODIUM BLD-SCNC: 141 MMOL/L (ref 136–145)
TCO2 ARTERIAL: 26.7 MMOL/L
TCO2 ARTERIAL: 28.6 MMOL/L
TOTAL PROTEIN: 5.6 G/DL (ref 6.4–8.2)
WBC # BLD: 17.3 K/UL (ref 4–11)
WBC # BLD: 19.4 K/UL (ref 4–11)

## 2018-08-07 PROCEDURE — 2060000000 HC ICU INTERMEDIATE R&B

## 2018-08-07 PROCEDURE — 6360000002 HC RX W HCPCS: Performed by: INTERNAL MEDICINE

## 2018-08-07 PROCEDURE — 6370000000 HC RX 637 (ALT 250 FOR IP): Performed by: INTERNAL MEDICINE

## 2018-08-07 PROCEDURE — 2580000003 HC RX 258: Performed by: HOSPITALIST

## 2018-08-07 PROCEDURE — 2500000003 HC RX 250 WO HCPCS: Performed by: EMERGENCY MEDICINE

## 2018-08-07 PROCEDURE — 6370000000 HC RX 637 (ALT 250 FOR IP): Performed by: FAMILY MEDICINE

## 2018-08-07 PROCEDURE — 74176 CT ABD & PELVIS W/O CONTRAST: CPT

## 2018-08-07 PROCEDURE — 94150 VITAL CAPACITY TEST: CPT

## 2018-08-07 PROCEDURE — 99233 SBSQ HOSP IP/OBS HIGH 50: CPT | Performed by: INTERNAL MEDICINE

## 2018-08-07 PROCEDURE — 94667 MNPJ CHEST WALL 1ST: CPT

## 2018-08-07 PROCEDURE — 84100 ASSAY OF PHOSPHORUS: CPT

## 2018-08-07 PROCEDURE — 2500000003 HC RX 250 WO HCPCS: Performed by: HOSPITALIST

## 2018-08-07 PROCEDURE — 2700000000 HC OXYGEN THERAPY PER DAY

## 2018-08-07 PROCEDURE — 2580000003 HC RX 258

## 2018-08-07 PROCEDURE — 85025 COMPLETE CBC W/AUTO DIFF WBC: CPT

## 2018-08-07 PROCEDURE — 2580000003 HC RX 258: Performed by: INTERNAL MEDICINE

## 2018-08-07 PROCEDURE — 83735 ASSAY OF MAGNESIUM: CPT

## 2018-08-07 PROCEDURE — 80053 COMPREHEN METABOLIC PANEL: CPT

## 2018-08-07 PROCEDURE — C9113 INJ PANTOPRAZOLE SODIUM, VIA: HCPCS | Performed by: INTERNAL MEDICINE

## 2018-08-07 PROCEDURE — 94640 AIRWAY INHALATION TREATMENT: CPT

## 2018-08-07 PROCEDURE — 74018 RADEX ABDOMEN 1 VIEW: CPT

## 2018-08-07 PROCEDURE — 82803 BLOOD GASES ANY COMBINATION: CPT

## 2018-08-07 PROCEDURE — 2580000003 HC RX 258: Performed by: FAMILY MEDICINE

## 2018-08-07 PROCEDURE — 31720 CLEARANCE OF AIRWAYS: CPT

## 2018-08-07 PROCEDURE — 6360000002 HC RX W HCPCS: Performed by: HOSPITALIST

## 2018-08-07 PROCEDURE — 2580000003 HC RX 258: Performed by: EMERGENCY MEDICINE

## 2018-08-07 PROCEDURE — 94761 N-INVAS EAR/PLS OXIMETRY MLT: CPT

## 2018-08-07 PROCEDURE — 85027 COMPLETE CBC AUTOMATED: CPT

## 2018-08-07 PROCEDURE — 94668 MNPJ CHEST WALL SBSQ: CPT

## 2018-08-07 PROCEDURE — 83605 ASSAY OF LACTIC ACID: CPT

## 2018-08-07 PROCEDURE — 94664 DEMO&/EVAL PT USE INHALER: CPT

## 2018-08-07 RX ORDER — MAGNESIUM SULFATE 1 G/100ML
1 INJECTION INTRAVENOUS PRN
Status: DISCONTINUED | OUTPATIENT
Start: 2018-08-07 | End: 2018-08-14 | Stop reason: HOSPADM

## 2018-08-07 RX ORDER — POTASSIUM CHLORIDE 29.8 MG/ML
20 INJECTION INTRAVENOUS PRN
Status: DISCONTINUED | OUTPATIENT
Start: 2018-08-07 | End: 2018-08-14 | Stop reason: HOSPADM

## 2018-08-07 RX ORDER — SODIUM CHLORIDE 9 MG/ML
INJECTION, SOLUTION INTRAVENOUS
Status: COMPLETED
Start: 2018-08-07 | End: 2018-08-07

## 2018-08-07 RX ADMIN — SODIUM CHLORIDE, PRESERVATIVE FREE 10 ML: 5 INJECTION INTRAVENOUS at 10:55

## 2018-08-07 RX ADMIN — DORZOLAMIDE HYDROCHLORIDE AND TIMOLOL MALEATE 1 DROP: 20; 5 SOLUTION/ DROPS OPHTHALMIC at 11:08

## 2018-08-07 RX ADMIN — SODIUM PHOSPHATE, MONOBASIC, MONOHYDRATE 27.57 MMOL: 276; 142 INJECTION, SOLUTION INTRAVENOUS at 21:19

## 2018-08-07 RX ADMIN — SENNOSIDES 8.6 MG: 8.6 TABLET, FILM COATED ORAL at 10:55

## 2018-08-07 RX ADMIN — IPRATROPIUM BROMIDE AND ALBUTEROL SULFATE 1 AMPULE: .5; 3 SOLUTION RESPIRATORY (INHALATION) at 07:50

## 2018-08-07 RX ADMIN — ASPIRIN 81 MG 81 MG: 81 TABLET ORAL at 10:55

## 2018-08-07 RX ADMIN — POTASSIUM CHLORIDE 20 MEQ: 400 INJECTION, SOLUTION INTRAVENOUS at 21:53

## 2018-08-07 RX ADMIN — APIXABAN 5 MG: 5 TABLET, FILM COATED ORAL at 21:04

## 2018-08-07 RX ADMIN — MAGNESIUM HYDROXIDE 30 ML: 400 SUSPENSION ORAL at 10:53

## 2018-08-07 RX ADMIN — PIPERACILLIN AND TAZOBACTAM 3.38 G: 3; .375 INJECTION, POWDER, FOR SOLUTION INTRAVENOUS at 06:10

## 2018-08-07 RX ADMIN — DOCUSATE SODIUM 100 MG: 50 LIQUID ORAL at 20:56

## 2018-08-07 RX ADMIN — PIPERACILLIN AND TAZOBACTAM 3.38 G: 3; .375 INJECTION, POWDER, FOR SOLUTION INTRAVENOUS at 14:25

## 2018-08-07 RX ADMIN — SODIUM CHLORIDE, PRESERVATIVE FREE 10 ML: 5 INJECTION INTRAVENOUS at 10:56

## 2018-08-07 RX ADMIN — DORZOLAMIDE HYDROCHLORIDE AND TIMOLOL MALEATE 1 DROP: 20; 5 SOLUTION/ DROPS OPHTHALMIC at 21:07

## 2018-08-07 RX ADMIN — PANTOPRAZOLE SODIUM 40 MG: 40 INJECTION, POWDER, FOR SOLUTION INTRAVENOUS at 10:53

## 2018-08-07 RX ADMIN — Medication 1 CAPSULE: at 10:54

## 2018-08-07 RX ADMIN — POTASSIUM CHLORIDE 20 MEQ: 400 INJECTION, SOLUTION INTRAVENOUS at 22:55

## 2018-08-07 RX ADMIN — LATANOPROST 1 DROP: 50 SOLUTION OPHTHALMIC at 21:01

## 2018-08-07 RX ADMIN — SODIUM CHLORIDE 250 ML: 9 INJECTION, SOLUTION INTRAVENOUS at 21:04

## 2018-08-07 RX ADMIN — SODIUM CHLORIDE, PRESERVATIVE FREE 10 ML: 5 INJECTION INTRAVENOUS at 21:04

## 2018-08-07 RX ADMIN — CALCIUM GLUCONATE 1 G: 94 INJECTION, SOLUTION INTRAVENOUS at 21:19

## 2018-08-07 RX ADMIN — OXYCODONE HYDROCHLORIDE AND ACETAMINOPHEN 500 MG: 500 TABLET ORAL at 10:55

## 2018-08-07 RX ADMIN — BRIMONIDINE TARTRATE 1 DROP: 2 SOLUTION OPHTHALMIC at 11:08

## 2018-08-07 RX ADMIN — MUPIROCIN: 20 OINTMENT TOPICAL at 20:56

## 2018-08-07 RX ADMIN — PIPERACILLIN AND TAZOBACTAM 3.38 G: 3; .375 INJECTION, POWDER, FOR SOLUTION INTRAVENOUS at 21:50

## 2018-08-07 RX ADMIN — BRIMONIDINE TARTRATE 1 DROP: 2 SOLUTION OPHTHALMIC at 20:55

## 2018-08-07 RX ADMIN — MUPIROCIN: 20 OINTMENT TOPICAL at 10:55

## 2018-08-07 RX ADMIN — DOCUSATE SODIUM 100 MG: 50 LIQUID ORAL at 11:00

## 2018-08-07 RX ADMIN — IPRATROPIUM BROMIDE AND ALBUTEROL SULFATE 1 AMPULE: .5; 3 SOLUTION RESPIRATORY (INHALATION) at 15:25

## 2018-08-07 RX ADMIN — MINERAL SUPPLEMENT IRON 300 MG / 5 ML STRENGTH LIQUID 100 PER BOX UNFLAVORED 300 MG: at 10:55

## 2018-08-07 RX ADMIN — DIBASIC SODIUM PHOSPHATE, MONOBASIC POTASSIUM PHOSPHATE AND MONOBASIC SODIUM PHOSPHATE 250 MG: 852; 155; 130 TABLET ORAL at 10:53

## 2018-08-07 RX ADMIN — IPRATROPIUM BROMIDE AND ALBUTEROL SULFATE 1 AMPULE: .5; 3 SOLUTION RESPIRATORY (INHALATION) at 19:53

## 2018-08-07 RX ADMIN — POTASSIUM CHLORIDE 20 MEQ: 400 INJECTION, SOLUTION INTRAVENOUS at 20:57

## 2018-08-07 RX ADMIN — NOREPINEPHRINE BITARTRATE 5 MCG/MIN: 1 INJECTION INTRAVENOUS at 02:13

## 2018-08-07 RX ADMIN — IPRATROPIUM BROMIDE AND ALBUTEROL SULFATE 1 AMPULE: .5; 3 SOLUTION RESPIRATORY (INHALATION) at 11:54

## 2018-08-07 RX ADMIN — APIXABAN 5 MG: 5 TABLET, FILM COATED ORAL at 10:55

## 2018-08-07 ASSESSMENT — PULMONARY FUNCTION TESTS
PIF_VALUE: 27
PIF_VALUE: 20
PIF_VALUE: 22

## 2018-08-07 NOTE — PROGRESS NOTES
Pt still having high residuals. Orders to keep compat and not start TF. Will re-assess in the morning per Dr Oneyda Orozco.  Mayito Perez RN

## 2018-08-07 NOTE — PROGRESS NOTES
08/06/18 2022   Vent Information   Vent Type 840   Vent Mode AC/VC   Vt Ordered 450 mL   Rate Set 14 bmp   Peak Flow 60 L/min   Pressure Support 0 cmH20   FiO2  40 %   Sensitivity 4   PEEP/CPAP 5   Cuff Pressure (cm H2O) 32 cm H2O   Humidification Source HME   Vent Patient Data   Vt Exhaled 548 mL   Rate Measured 21 br/min   Minute Volume 8.97 Liters   Peak Inspiratory Pressure 22 cmH2O   I:E Ratio 1:3.10   Mean Airway Pressure 13 cmH20   Plateau Pressure 19 GXO54   Static Compliance 39.1 mL/cmH2O   Dynamic Compliance 32.2 mL/cmH2O   Spontaneous Breathing Trial (SBT) RT Doc   Pulse 90   SpO2 99 %   Cough/Sputum   Sputum How Obtained Endotracheal   Cough Non-productive   Breath Sounds   Right Upper Lobe Clear   Right Middle Lobe Diminished   Right Lower Lobe Diminished   Left Upper Lobe Clear   Left Lower Lobe Diminished   Additional Respiratory  Assessments   Resp 25   End Tidal CO2 29 (%)   Alarm Settings   High Pressure Alarm 55 cmH2O   Low Minute Volume Alarm 2 L/min   High Respiratory Rate 40 br/min   Low Exhaled Vt  200 mL   ETT (adult)   Placement Date/Time: 08/03/18 1305   Preoxygenation: Yes  Mask Ventilation: Mask ventilation not attempted (0)  Technique: Direct laryngoscopy;Video laryngoscopy  Type: Cuffed  Tube Size: 7 mm  Laryngoscope: Mac  Blade Size: 4  Location: Oral  Grade V...    Secured at 23 cm   Measured From Lips   ET Placement Left   Secured By Commercial tube boyd   Site Condition Dry

## 2018-08-07 NOTE — PROGRESS NOTES
Tube feed remains on hold. NG residual check is 425ml. Dr. Darshana Larsen rounding and aware. CT Abd ordered. Attending notified.

## 2018-08-07 NOTE — PROGRESS NOTES
[facility]   Cardiovascular Evaluation    PATIENT: Deja Owens  DATE: 2018  MRN: [id number]  CSN: [id number]  : 1928    Primary Care Doctor: Uma Dutta    Reason for evaluation/Chief complaint:   Altered Mental Status (Pt incontinent of stool. Pt has suprapubic catheter.  )      Subjective: Extubated    History of present illness on initial date of evaluation:   Deja Owens is a 80 y.o. patient who presents with AMS. We have been asked to provide cardiac consultation for elevated troponin. The patient is not able to give detailed information about the events of hospitalization due to their underlying medical illness. The majority of the information is taken from the chart, medical staff, and available family. No family at bedside. No issues overnight or this AM.        Patient Active Problem List   Diagnosis    Sepsis ([address]    Lower urinary tract infectious disease    Debility    Leukocytosis    Syncope and collapse    Complicated UTI (urinary tract infection)    E coli bacteremia    Infection due to ESBL-producing Escherichia coli    Septic shock ([facility])    Pyuria    GONSALO (acute kidney injury) ([facility])    Lactic acidosis    Elevated troponin    Open-angle glaucoma    Altered mental status    Bacteremia due to Pseudomonas    Pulmonary infiltrates    Nephrolithiasis    Hydronephrosis with urinary obstruction due to renal calculus    Fecal impaction ([facility])    Pancreatic cyst    Acute respiratory insufficiency, postoperative    Acute renal failure ([address]         Cardiac Testing: I have reviewed the findings below. 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 Surgical History:   has a past surgical history that includes pr cystoscopy,insert ureteral stent (Right, 8/3/2018); pr brnchsc w/brncl alveolar lavage (N/A, 8/4/2018); and bronchoscopy (N/A, 8/6/2018). Social History:   reports that he has never smoked. He has never used smokeless tobacco. He reports that he does not drink alcohol or use drugs. Family History:  No evidence for sudden cardiac death or premature CAD    Medications:  Reviewed and are listed in nursing record. and/or listed below  Outpatient Medications:  Prior to Admission medications    Medication Sig Start Date End Date Taking?  Authorizing Provider   apixaban (ELIQUIS) 5 MG TABS tablet Take 2 tablets by mouth 2 times daily 6/18/15   Camilo Arguello MD   apixaban Shanna Margarita) 5 MG TABS tablet Take 1 tablet by mouth 2 times daily 6/24/15   Camilo Arguello MD   ferrous sulfate 325 (65 FE) MG EC tablet Take 1 tablet by mouth daily (with breakfast) 6/18/15   Camilo Arguello MD   latanoprost (XALATAN) 0.005 % ophthalmic solution   Place 1 drop into both eyes nightly     Historical Provider, MD   brimonidine (ALPHAGAN) 0.2 % ophthalmic solution Place 1 drop into both eyes 2 times daily    Historical Provider, MD   omeprazole (PRILOSEC) 20 MG capsule Take 20 mg by mouth daily    Historical Provider, MD   furosemide (LASIX) 20 MG tablet Take 20 mg by mouth daily    Historical Provider, MD   tamsulosin (FLOMAX) 0.4 MG capsule Take 0.4 mg by mouth nightly    Historical Provider, MD   potassium chloride SA (K-DUR;KLOR-CON M) 20 MEQ tablet Take 20 mEq by mouth 2 times daily    Historical Provider, MD   docusate sodium (COLACE) 100 MG capsule Take 100 mg by mouth 2 times times per day    lactobacillus  1 capsule Oral BID WC         Infusion Medications: Allergies:  Patient has no known allergies. Review of Systems:   14 point review of systems unable to be completed due to patient condition/cooperation. All available positives mentioned in history of present illness. Physical Examination:    [unfilled]  /69   Pulse 98   Temp 98.5 °F (36.9 °C) (Axillary)   Resp 25   Ht 5' 9\" (1.753 m)   Wt 190 lb 0.6 oz (86.2 kg)   SpO2 93%   BMI 28.06 kg/m²    Weight: 190 lb 0.6 oz (86.2 kg)     Wt Readings from Last 3 Encounters:   08/07/18 190 lb 0.6 oz (86.2 kg)   06/09/15 134 lb 7.7 oz (61 kg)       Intake/Output Summary (Last 24 hours) at 08/07/18 1717  Last data filed at 08/07/18 1425   Gross per 24 hour   Intake              903 ml   Output             1200 ml   Net             -297 ml       General Appearance:  no distress, appears younger than stated age   Head:  Normocephalic, without obvious abnormality, atraumatic   Eyes:  PERRL, conjunctiva/corneas clear       Nose: Nares normal, no drainage or sinus tenderness   Throat: Lips, mucosa, and tongue normal   Neck: Supple, symmetrical, trachea midline, no adenopathy, thyroid: not enlarged, symmetric, no tenderness/mass/nodules, no carotid bruit or JVD       Lungs:   Clear to auscultation bilaterally, respirations unlabored   Chest Wall:  No tenderness or deformity   Heart:  Regular rhythm and normal rate; S1, S2 are normal; no murmur noted; no rub or gallop   Abdomen:   Soft, non-tender, bowel sounds active all four quadrants,  no masses, no organomegaly           Extremities: Extremities normal, atraumatic, no cyanosis or edema   Pulses: 2+ and symmetric   Skin: Skin color, texture, turgor normal, no rashes or lesions   Pysch:  Attempts to open eyes   Neurologic: Attempts to open eyes        Labs  Recent Labs      08/06/18   0632  08/07/18   0420   WBC  18.0*  17.3*   HGB  10.4*  10.9*   HCT  31.6*  33.0*   MCV 91.4  89.9   PLT  77*  78*     Recent Labs      08/05/18   0530  08/06/18   0632   CREATININE  1.1  1.0   BUN  14  13   NA  138  141   K  3.4*  3.2*   CL  101  105   CO2  31  28     No results for input(s): INR, PROTIME in the last 72 hours. Recent Labs      08/05/18   1340   TROPONINI  0.12*     Invalid input(s): PRO-BNP  No results for input(s): CHOL, HDL in the last 72 hours. Invalid input(s): LDL, TG      Imaging:  I have reviewed the below testing personally and my interpretation is below. EKG: Sinus rhythm with 1st degree A-V blockLow voltage QRSAbnormal ECGWhen compared with ECG of 02-AUG-2018 11:29,Significant changes have occurredConfirmed by Kiel Hurt MD, Tootie Escalante ([id number]) on 8/5/2018 8:57:13 PM  CXR:      Assessment:  80 y.o. patient with:  Problem List Items Addressed This Visit     Sepsis ([facility] - Primary    Complicated UTI (urinary tract infection)    Elevated troponin    Altered mental status    Acute renal failure ([facility]          Plan:  1. Echocardiogram shows normal LVEF with moderate LVH  2. Elevated troponin likely from demand ischemia and medical issues  3. Not candidate for cath lab due to depressed mental status, thrombocytopenia, and sepsis  4. Will sign off as no cardiac issues at this point. All questions and concerns were addressed to the patient/family. Alternatives to my treatment were discussed. The note was completed using EMR. Every effort was made to ensure accuracy; however, inadvertent computerized transcription errors may be present.     Debbie Dance, MD, [id number], [facility]  [phone] [facility] office  [phone] [facility]  8/7/2018  5:17 PM

## 2018-08-07 NOTE — FLOWSHEET NOTE
2000 Pt assessed, see note for details. 0020 Dr. Kash Wolf text paged, \"chest x-ray states his CL has been pulled out some and needs advancing. \"  0033 Call back from Dr. Lorena Allison, \"Ok to keep where it is for now. If I get a chance later, I will come up and advance it. \"

## 2018-08-07 NOTE — PROGRESS NOTES
RT called to bedside for suspected blown cuff, TIDAL volumes below 300 ml, audible leak heard. Pt extubated and placed on 4 lpm humidified o2, sat 99%. Pt cristian well.

## 2018-08-07 NOTE — PROGRESS NOTES
Infectious Disease Follow up Notes    CC :  Sepsis, bacteremia, obstructing ureteral stone       Antibiotics:   Zosyn 3.375 q8    Admit Date:   8/2/2018  Hospital Day: 6    Subjective:   Extubated, now off levophed  He remains lethargic. No BM. Increased gastric residuals. TF held. Good UOP  Patient denies pain. Objective:     Patient Vitals for the past 8 hrs:   BP Pulse Resp SpO2   08/07/18 1525 - - 25 93 %   08/07/18 1154 - - 21 97 %   08/07/18 0900 110/68 95 25 95 %   08/07/18 0830 - 95 27 97 %   08/07/18 0800 116/70 93 23 100 %   08/07/18 0750 - - 20 98 %       EXAM:  General:  Lethargic but following simple commands  HEENT:  NCAT, muddy sclera, pupils reactive. Dry oral mucosa, poor dentition   NECK:  supple  LUNGS:  Coarse ventilated breath sounds, equal grady   CV:    RRR without murmur  ABD:  Hypoactive bowel sounds.   Distended, tender  EXT:  No acute rash       LINE: R IJ CVL site ok       Scheduled Meds:   docusate  100 mg Oral BID    pantoprazole  40 mg Intravenous Daily    And    sodium chloride flush  10 mL Intravenous Daily    mupirocin   Nasal BID    ferrous sulfate  300 mg Oral Daily    piperacillin-tazobactam  3.375 g Intravenous Q8H    phosphorus  250 mg Per NG tube BID    aspirin  81 mg Oral Daily    atorvastatin  40 mg Oral Nightly    ipratropium-albuterol  1 ampule Inhalation Q4H WA    apixaban  5 mg Oral BID    ascorbic acid  500 mg Oral Daily    brimonidine  1 drop Both Eyes BID    dorzolamide-timolol  1 drop Both Eyes BID    latanoprost  1 drop Both Eyes Nightly    senna  1 tablet Oral BID    sodium chloride flush  10 mL Intravenous 2 times per day    lactobacillus  1 capsule Oral BID WC       Continuous Infusions:         Data Review:    Lab Results   Component Value Date    WBC 17.3 (H) 08/07/2018    HGB 10.9 (L) 08/07/2018    HCT 33.0 (L) 08/07/2018    MCV 89.9 08/07/2018    PLT 78 (L) 08/07/2018     Lab Results   Component Value Date    CREATININE 1.0 08/06/2018    BUN 13 08/06/2018     08/06/2018    K 3.2 (L) 08/06/2018     08/06/2018    CO2 28 08/06/2018       Hepatic Function Panel:   Lab Results   Component Value Date    ALKPHOS 110 08/04/2018     08/04/2018     08/04/2018    PROT 5.1 08/04/2018    BILITOT 0.4 08/04/2018    BILIDIR 0.5 06/11/2015    IBILI 0.3 06/11/2015    LABALBU 1.9 08/06/2018         MICRO:  8/2 BC x2 E coli, Pseudomonas  ESCHERICHIA COLI   Antibiotic Interpretation HARRIETT Unit   amoxicillin-clavulanate Sensitive <=8/4 mcg/mL   ampicillin Resistant >16 mcg/mL   ceFAZolin Resistant >16 mcg/mL   cefOXitin Sensitive <=8 mcg/mL   cefTRIAXone Resistant 32 mcg/mL   cefepime Sensitive <=2 mcg/mL   cefotaxime Intermediate 16 mcg/mL   cefuroxime Resistant >16 mcg/mL   ciprofloxacin Resistant >2 mcg/mL   gentamicin Sensitive <=4 mcg/mL   meropenem Sensitive <=1 mcg/mL   piperacillin-tazobactam Sensitive <=16 mcg/mL   trimethoprim-sulfamethoxazole Resistant >2/38 mcg/mL     8/3  UC E coli, Pseudomonas   ESCHERICHIA COLI   Antibiotic Interpretation HARRIETT Unit   amoxicillin-clavulanate Sensitive <=8/4 mcg/mL   ampicillin Resistant >16 mcg/mL   ceFAZolin Resistant 16 mcg/mL   Comment: NOTE: Cefazolin should only be used for uncomplicated UTI        for E.coli, Klebsiella pneumoniae or Proteus mirabilis.    cefOXitin Sensitive <=8 mcg/mL   cefTRIAXone Resistant 8 mcg/mL   cefepime Sensitive <=2 mcg/mL   cefotaxime Sensitive 4 mcg/mL   cefuroxime Resistant >16 mcg/mL   ciprofloxacin Resistant >2 mcg/mL   gentamicin Sensitive <=4 mcg/mL   meropenem Sensitive <=1 mcg/mL   nitrofurantoin Sensitive <=32 mcg/mL   piperacillin-tazobactam Sensitive <=16 mcg/mL   trimethoprim-sulfamethoxazole Resistant >2/38 mcg/mL         PSEUDOMONAS AERUGINOSA   Antibiotic Interpretation HARRIETT Unit   cefepime Sensitive <=2 mcg/mL   ciprofloxacin Sensitive <=1 mcg/mL   gentamicin Sensitive <=4 mcg/mL   meropenem Sensitive <=1 mcg/mL   piperacillin-tazobactam Sensitive <=16 mcg/mL   tobramycin Sensitive <=4 mcg/mL        8/4 BC x1 NGTD   Resp cx with normal respiratory nimo       IMAGING:  CT abd 8/2:  Impression   1. 4 mm obstructing nephrolithiasis within the right proximal ureter causing   moderate hydronephrosis. 2. Bilateral lower lobe pneumonia.  Recommend chest radiograph in 8 weeks to   confirm resolution. 3. Mildly dilated appendix without periappendiceal inflammation can be seen   in the setting of early acute appendicitis. 4. Unchanged 2.9 cm pancreatic cystic lesion. 5. Fecal impaction. CXR 8/6:  FINDINGS:   Endotracheal tube with tip approximately 4.9 cm from the rylee.  NG tube   courses below the diaphragm.  Right IJ catheter has been pulled back with tip   projecting above the clavicle.  Slight improvement in aeration of the mid and   upper lungs.  No substantial change otherwise.        Assessment:     Patient Active Problem List    Diagnosis Date Noted    Bacteremia due to Pseudomonas 08/03/2018    Pulmonary infiltrates 08/03/2018    Nephrolithiasis 08/03/2018    Hydronephrosis with urinary obstruction due to renal calculus 08/03/2018    Fecal impaction ([address] 08/03/2018    Pancreatic cyst 08/03/2018    Acute respiratory insufficiency, postoperative 08/03/2018    Acute renal failure ([address]     Septic shock ([address] 08/02/2018    Pyuria 08/02/2018    GONSALO (acute kidney injury) ([address] 08/02/2018    Lactic acidosis 08/02/2018    Elevated troponin 08/02/2018    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    Altered mental status     Infection due to ESBL-producing Escherichia coli 06/19/2015    E coli bacteremia 06/17/2015    Leukocytosis 06/15/2015    Syncope and collapse     Complicated UTI (urinary tract infection)     Sepsis ([address] 06/13/2015    Lower urinary tract infectious disease 06/13/2015     Overview Note:     replace inactive diagnosis      Debility 06/13/2015

## 2018-08-07 NOTE — PROGRESS NOTES
Fall risk assessment complete, fall precautions in place. Fall visuals posted, bed alarm on, bed in lowest position with wheels locked. Patient has been free of falls this shift, will continue to monitor.

## 2018-08-07 NOTE — PROGRESS NOTES
5' 9\" (1.753 m)   Wt 190 lb 0.6 oz (86.2 kg)   SpO2 97%   BMI 28.06 kg/m²     General appearance: lethargic but awake   HEENT: Pupils equal, round, Conjunctivae/corneas clear. Neck: Supple, with full range of motion. No jugular venous distention. Trachea midline. Respiratory: bibasilar rales   Cardiovascular: Regular rate and rhythm with normal S1/S2 without murmurs, rubs or gallops. Abdomen: Soft, non-tender, non-distended with normal bowel sounds. Musculoskeletal: No clubbing, cyanosis or edema bilaterally. Skin: Skin color, texture, turgor normal.  No rashes or lesions on exposed skin . Neurologic:  Awake, following commands, moving all extremities spontaneously  Psychiatric: unable to assess   Capillary Refill: Brisk,< 3 seconds   Peripheral Pulses: +2 palpable, equal bilaterally       Labs:   Recent Labs      08/06/18   0632  08/07/18   0420   WBC  18.0*  17.3*   HGB  10.4*  10.9*   HCT  31.6*  33.0*   PLT  77*  78*     Recent Labs      08/05/18   0015  08/05/18   0530  08/06/18   0632   NA  137  138  141   K  3.5  3.4*  3.2*   CL  100  101  105   CO2  29  31  28   BUN  16  14  13   CREATININE  1.2  1.1  1.0   CALCIUM  7.2*  7.3*  7.5*   PHOS  1.2*  1.1*  1.8*     No results for input(s): AST, ALT, BILIDIR, BILITOT, ALKPHOS in the last 72 hours. No results for input(s): INR in the last 72 hours. Recent Labs      08/05/18   1340   TROPONINI  0.12*       Urinalysis:      Lab Results   Component Value Date    NITRU POSITIVE 08/02/2018    WBCUA >100 08/02/2018    BACTERIA 1+ 08/02/2018    RBCUA  08/02/2018    BLOODU MODERATE 08/02/2018    SPECGRAV 1.010 08/02/2018    GLUCOSEU Negative 08/02/2018       Radiology:  XR ABDOMEN (KUB) (SINGLE AP VIEW)   Final Result   Nasoenteric tube tip overlies the greater curvature of the stomach. XR CHEST PORTABLE   Final Result   Right IJ catheter has been pulled back with tip above the clavicle. Recommend repositioning.       Some improvement in troponin [R74.8] 08/02/2018    Open-angle glaucoma [H40.10X0] 08/02/2018    Altered mental status [R41.82]     E coli bacteremia [R78.81] 06/17/2015    Sepsis ([facility] [A41.9] 06/13/2015     Septic shock : required multiple pressors which is being  Weaned off . Continue abx per ID ( zosyn for pseudomonas bacteremia)       Respiratory failure requiring mechanical ventilation . S/p  Bronchoscopy on 8/6. Follow bronch cultures. Extubated on 8/7 which he seems to be tolerating. 02 as needed     Hydronephrosis with urinary obstruction:  s/p  cystoscopy and right ureteral stent (Dr. Padmaja Bonilla) to relieve obstruction of right kidney. Incidental finding dilated appendix on CT:  Appreciate general surgery evaluation (Dr. Batsheva Valverde).  CT finding does not represent appendicitis and patient not a candidate for surgery given clinical condition. Metabolic acidosis secondary to lactate and sepsis resolving.  Did not need  CRRT per nephrology. UOP improving on IVF which has now been d/epi          Open angle glaucoma continues on brimonidine, dorzolamide-timolol, and latanoprost eye drops to avoid rebound increase in intraocular pressure. Stress ulcer prophylaxis with pantoprazole (Protonix). Iron deficiency anemia continues on ferrous sulfate and ascorbic acid. Hypokalemia and hypomagnesemia:   monitoring and replacing as needed     Elevated troponin likely secondary to sepsis and demand ischemia. Cardio recs noted and appreciated. Not candidate for cath lab due to depressed mental status, thrombocytopenia, and sepsis     DVT Prophylaxis:  On eliquis      Diet: DIET TUBE FEED CONTINUOUS/CYCLIC NPO; STANDARD WITH FIBER; Nasogastric; Continuous; 20; 65  Code Status: DNR-CCA    PT/OT Eval Status: ordered     Dispo -  OK for transfer out of ICU to   per Critical care. Agree with plan.  Transfer order placed       Sayda Deng MD

## 2018-08-07 NOTE — PROGRESS NOTES
[facility] cross cover paged for the patient's CT results. Patient stable at this time.

## 2018-08-07 NOTE — PROGRESS NOTES
Recent Labs      08/05/18   0015  08/05/18   0530  08/06/18   0632   NA  137  138  141   K  3.5  3.4*  3.2*   CL  100  101  105   CO2  29  31  28   PHOS  1.2*  1.1*  1.8*   BUN  16  14  13   CREATININE  1.2  1.1  1.0       Imaging:  I have reviewed radiology images personally. XR ABDOMEN (KUB) (SINGLE AP VIEW)   Final Result   Nasoenteric tube tip overlies the greater curvature of the stomach. XR CHEST PORTABLE   Final Result   Right IJ catheter has been pulled back with tip above the clavicle. Recommend repositioning. Some improvement in aeration of the lungs. XR CHEST PORTABLE   Final Result   Pleural effusions with increasing atelectasis and/or pneumonia. XR CHEST PORTABLE   Final Result   1. Bibasilar atelectasis and/or pneumonia. 2. The enteric tube is in good position in the stomach. XR ABDOMEN (KUB) (SINGLE AP VIEW)   Final Result   Successful placement of right ureteral stent. CT ABDOMEN PELVIS WO CONTRAST Additional Contrast? None   Final Result   1. 4 mm obstructing nephrolithiasis within the right proximal ureter causing   moderate hydronephrosis. 2. Bilateral lower lobe pneumonia. Recommend chest radiograph in 8 weeks to   confirm resolution. 3. Mildly dilated appendix without periappendiceal inflammation can be seen   in the setting of early acute appendicitis. 4. Unchanged 2.9 cm pancreatic cystic lesion. 5. Fecal impaction. XR CHEST PORTABLE   Final Result   Curvilinear opacity within the medial right lung base, which could represent   atelectasis, pneumonia, or aspiration. XR CHEST PORTABLE   Final Result   1. Right internal jugular catheter terminating in the mid SVC. CT HEAD WO CONTRAST   Final Result   No acute intracranial abnormality. FLUORO FOR SURGICAL PROCEDURES    (Results Pending)     Results for Briseida Godwin (MRN [id number]) as of 8/7/2018 19:09   Ref.  Range 8/3/2018 23:36 8/4/2018 05:30

## 2018-08-07 NOTE — FLOWSHEET NOTE
NG aspirated for gastric contents prior to medication administration. 350ml of gastric contents aspirated. The patient's tube feed has been off since 0745. The dietician is at the bedside. Will continue to hold. PRN Milk of Mag given along with scheduled Senna and Colace.

## 2018-08-07 NOTE — PROGRESS NOTES
4 Eyes Skin Assessment     The patient is being assess for   Shift Handoff    I agree that 2 RN's have performed a thorough Head to Toe Skin Assessment on the patient. ALL assessment sites listed below have been assessed. Areas assessed by both nurses:   [x]   Head, Face, and Ears   [x]   Shoulders, Back, and Chest, Abdomen  [x]   Arms, Elbows, and Hands   [x]   Coccyx, Sacrum, and Ischium  [x]   Legs, Feet, and Heels            **SHARE this note so that the co-signing nurse is able to place an eSignature**    Co-signer eSignature: Electronically signed by Brandy Evans RN on 8/7/18 at 7:23 PM    Does the Patient have Skin Breakdown?   No          Naldo Prevention initiated:  Yes   Wound Care Orders initiated:  No      [facility] nurse consulted for Pressure Injury (Stage 3,4, Unstageable, DTI, NWPT, Complex wounds)and New or Established Ostomies:  No      Primary Nurse eSignature: Electronically signed by Vane Collins RN on 8/7/18 at 7:51 AM

## 2018-08-07 NOTE — PROGRESS NOTES
08/07/18 0341   Vent Information   Vent Type 840   Vent Mode AC/VC   Vt Ordered 450 mL   Rate Set 14 bmp   Peak Flow 60 L/min   Pressure Support 0 cmH20   FiO2  40 %   Sensitivity 4   PEEP/CPAP 5   Vent Patient Data   Vt Exhaled 347 mL   Rate Measured 27 br/min   Minute Volume 7.21 Liters   Peak Inspiratory Pressure 20 cmH2O   I:E Ratio 1:1.50   Mean Airway Pressure 13 cmH20   Spontaneous Breathing Trial (SBT) RT Doc   Pulse 94   SpO2 99 %   Cough/Sputum   Sputum How Obtained Endotracheal   Cough Productive   Sputum Amount Small   Sputum Color Clear   Tenacity Thin   Breath Sounds   Right Upper Lobe Clear   Right Middle Lobe Clear   Right Lower Lobe Diminished   Left Upper Lobe Clear   Left Lower Lobe Diminished   Additional Respiratory  Assessments   Resp 22   End Tidal CO2 28 (%)   Position Semi-Correa's   Subglottic Suction Done? Yes   Alarm Settings   High Pressure Alarm 55 cmH2O   Low Minute Volume Alarm 2 L/min   High Respiratory Rate 40 br/min   ETT (adult)   Placement Date/Time: 08/03/18 1305   Preoxygenation: Yes  Mask Ventilation: Mask ventilation not attempted (0)  Technique: Direct laryngoscopy;Video laryngoscopy  Type: Cuffed  Tube Size: 7 mm  Laryngoscope: Mac  Blade Size: 4  Location: Oral  Grade V...    Secured at 23 cm   Measured From Lips   ET Placement Left   Secured By Commercial tube boyd   Site Condition Cool;Dry

## 2018-08-08 PROBLEM — E87.6 HYPOKALEMIA: Status: ACTIVE | Noted: 2018-08-08

## 2018-08-08 LAB
ALBUMIN SERPL-MCNC: 1.8 G/DL (ref 3.4–5)
ALP BLD-CCNC: 302 U/L (ref 40–129)
ALT SERPL-CCNC: 213 U/L (ref 10–40)
ANION GAP SERPL CALCULATED.3IONS-SCNC: 8 MMOL/L (ref 3–16)
AST SERPL-CCNC: 196 U/L (ref 15–37)
BILIRUB SERPL-MCNC: 2.2 MG/DL (ref 0–1)
BILIRUBIN DIRECT: 1.9 MG/DL (ref 0–0.3)
BILIRUBIN, INDIRECT: 0.3 MG/DL (ref 0–1)
BUN BLDV-MCNC: 10 MG/DL (ref 7–20)
CALCIUM SERPL-MCNC: 7.8 MG/DL (ref 8.3–10.6)
CHLORIDE BLD-SCNC: 105 MMOL/L (ref 99–110)
CO2: 28 MMOL/L (ref 21–32)
CREAT SERPL-MCNC: 1 MG/DL (ref 0.8–1.3)
GFR AFRICAN AMERICAN: >60
GFR NON-AFRICAN AMERICAN: >60
GLUCOSE BLD-MCNC: 61 MG/DL (ref 70–99)
HCT VFR BLD CALC: 33.4 % (ref 40.5–52.5)
HEMOGLOBIN: 10.9 G/DL (ref 13.5–17.5)
MAGNESIUM: 2.4 MG/DL (ref 1.8–2.4)
MCH RBC QN AUTO: 29.5 PG (ref 26–34)
MCHC RBC AUTO-ENTMCNC: 32.8 G/DL (ref 31–36)
MCV RBC AUTO: 89.9 FL (ref 80–100)
PDW BLD-RTO: 14 % (ref 12.4–15.4)
PHOSPHORUS: 2.3 MG/DL (ref 2.5–4.9)
PLATELET # BLD: 94 K/UL (ref 135–450)
PMV BLD AUTO: 10 FL (ref 5–10.5)
POTASSIUM SERPL-SCNC: 2.6 MMOL/L (ref 3.5–5.1)
RBC # BLD: 3.71 M/UL (ref 4.2–5.9)
SODIUM BLD-SCNC: 141 MMOL/L (ref 136–145)
TOTAL PROTEIN: 5.4 G/DL (ref 6.4–8.2)
WBC # BLD: 17.5 K/UL (ref 4–11)

## 2018-08-08 PROCEDURE — 6360000002 HC RX W HCPCS: Performed by: INTERNAL MEDICINE

## 2018-08-08 PROCEDURE — 83735 ASSAY OF MAGNESIUM: CPT

## 2018-08-08 PROCEDURE — 94640 AIRWAY INHALATION TREATMENT: CPT

## 2018-08-08 PROCEDURE — 99222 1ST HOSP IP/OBS MODERATE 55: CPT | Performed by: SURGERY

## 2018-08-08 PROCEDURE — 6370000000 HC RX 637 (ALT 250 FOR IP): Performed by: FAMILY MEDICINE

## 2018-08-08 PROCEDURE — 2060000000 HC ICU INTERMEDIATE R&B

## 2018-08-08 PROCEDURE — 2580000003 HC RX 258: Performed by: FAMILY MEDICINE

## 2018-08-08 PROCEDURE — 99233 SBSQ HOSP IP/OBS HIGH 50: CPT | Performed by: INTERNAL MEDICINE

## 2018-08-08 PROCEDURE — 80076 HEPATIC FUNCTION PANEL: CPT

## 2018-08-08 PROCEDURE — 6370000000 HC RX 637 (ALT 250 FOR IP): Performed by: INTERNAL MEDICINE

## 2018-08-08 PROCEDURE — 85027 COMPLETE CBC AUTOMATED: CPT

## 2018-08-08 PROCEDURE — 84100 ASSAY OF PHOSPHORUS: CPT

## 2018-08-08 PROCEDURE — 2580000003 HC RX 258: Performed by: INTERNAL MEDICINE

## 2018-08-08 PROCEDURE — 2700000000 HC OXYGEN THERAPY PER DAY

## 2018-08-08 PROCEDURE — 94668 MNPJ CHEST WALL SBSQ: CPT

## 2018-08-08 PROCEDURE — 94761 N-INVAS EAR/PLS OXIMETRY MLT: CPT

## 2018-08-08 PROCEDURE — 2500000003 HC RX 250 WO HCPCS: Performed by: INTERNAL MEDICINE

## 2018-08-08 PROCEDURE — C9113 INJ PANTOPRAZOLE SODIUM, VIA: HCPCS | Performed by: INTERNAL MEDICINE

## 2018-08-08 PROCEDURE — 80048 BASIC METABOLIC PNL TOTAL CA: CPT

## 2018-08-08 RX ORDER — DEXTROSE, SODIUM CHLORIDE, AND POTASSIUM CHLORIDE 5; .45; .15 G/100ML; G/100ML; G/100ML
INJECTION INTRAVENOUS CONTINUOUS
Status: DISCONTINUED | OUTPATIENT
Start: 2018-08-08 | End: 2018-08-14 | Stop reason: HOSPADM

## 2018-08-08 RX ADMIN — PIPERACILLIN AND TAZOBACTAM 3.38 G: 3; .375 INJECTION, POWDER, FOR SOLUTION INTRAVENOUS at 14:15

## 2018-08-08 RX ADMIN — POTASSIUM CHLORIDE 20 MEQ: 400 INJECTION, SOLUTION INTRAVENOUS at 07:24

## 2018-08-08 RX ADMIN — MUPIROCIN: 20 OINTMENT TOPICAL at 20:03

## 2018-08-08 RX ADMIN — IPRATROPIUM BROMIDE AND ALBUTEROL SULFATE 1 AMPULE: .5; 3 SOLUTION RESPIRATORY (INHALATION) at 08:14

## 2018-08-08 RX ADMIN — IPRATROPIUM BROMIDE AND ALBUTEROL SULFATE 1 AMPULE: .5; 3 SOLUTION RESPIRATORY (INHALATION) at 11:56

## 2018-08-08 RX ADMIN — IPRATROPIUM BROMIDE AND ALBUTEROL SULFATE 1 AMPULE: .5; 3 SOLUTION RESPIRATORY (INHALATION) at 20:19

## 2018-08-08 RX ADMIN — DOCUSATE SODIUM 100 MG: 50 LIQUID ORAL at 20:59

## 2018-08-08 RX ADMIN — POTASSIUM CHLORIDE 20 MEQ: 400 INJECTION, SOLUTION INTRAVENOUS at 09:36

## 2018-08-08 RX ADMIN — PIPERACILLIN AND TAZOBACTAM 3.38 G: 3; .375 INJECTION, POWDER, FOR SOLUTION INTRAVENOUS at 05:36

## 2018-08-08 RX ADMIN — POTASSIUM CHLORIDE, DEXTROSE MONOHYDRATE AND SODIUM CHLORIDE: 150; 5; 450 INJECTION, SOLUTION INTRAVENOUS at 18:53

## 2018-08-08 RX ADMIN — BRIMONIDINE TARTRATE 1 DROP: 2 SOLUTION OPHTHALMIC at 20:03

## 2018-08-08 RX ADMIN — DORZOLAMIDE HYDROCHLORIDE AND TIMOLOL MALEATE 1 DROP: 20; 5 SOLUTION/ DROPS OPHTHALMIC at 20:04

## 2018-08-08 RX ADMIN — Medication 1 CAPSULE: at 10:43

## 2018-08-08 RX ADMIN — APIXABAN 5 MG: 5 TABLET, FILM COATED ORAL at 10:43

## 2018-08-08 RX ADMIN — SODIUM CHLORIDE, PRESERVATIVE FREE 10 ML: 5 INJECTION INTRAVENOUS at 19:58

## 2018-08-08 RX ADMIN — DORZOLAMIDE HYDROCHLORIDE AND TIMOLOL MALEATE 1 DROP: 20; 5 SOLUTION/ DROPS OPHTHALMIC at 09:38

## 2018-08-08 RX ADMIN — PIPERACILLIN AND TAZOBACTAM 3.38 G: 3; .375 INJECTION, POWDER, FOR SOLUTION INTRAVENOUS at 21:00

## 2018-08-08 RX ADMIN — Medication 1 CAPSULE: at 18:54

## 2018-08-08 RX ADMIN — MUPIROCIN: 20 OINTMENT TOPICAL at 19:58

## 2018-08-08 RX ADMIN — MUPIROCIN: 20 OINTMENT TOPICAL at 09:38

## 2018-08-08 RX ADMIN — IPRATROPIUM BROMIDE AND ALBUTEROL SULFATE 1 AMPULE: .5; 3 SOLUTION RESPIRATORY (INHALATION) at 16:45

## 2018-08-08 RX ADMIN — ASPIRIN 81 MG 81 MG: 81 TABLET ORAL at 10:43

## 2018-08-08 RX ADMIN — SODIUM CHLORIDE, PRESERVATIVE FREE 10 ML: 5 INJECTION INTRAVENOUS at 10:52

## 2018-08-08 RX ADMIN — BRIMONIDINE TARTRATE 1 DROP: 2 SOLUTION OPHTHALMIC at 09:39

## 2018-08-08 RX ADMIN — PANTOPRAZOLE SODIUM 40 MG: 40 INJECTION, POWDER, FOR SOLUTION INTRAVENOUS at 10:43

## 2018-08-08 RX ADMIN — LATANOPROST 1 DROP: 50 SOLUTION OPHTHALMIC at 20:03

## 2018-08-08 RX ADMIN — SODIUM CHLORIDE, PRESERVATIVE FREE 10 ML: 5 INJECTION INTRAVENOUS at 09:39

## 2018-08-08 RX ADMIN — POTASSIUM CHLORIDE 20 MEQ: 400 INJECTION, SOLUTION INTRAVENOUS at 10:49

## 2018-08-08 RX ADMIN — APIXABAN 5 MG: 5 TABLET, FILM COATED ORAL at 20:59

## 2018-08-08 RX ADMIN — DOCUSATE SODIUM 100 MG: 50 LIQUID ORAL at 10:43

## 2018-08-08 ASSESSMENT — PAIN SCALES - GENERAL
PAINLEVEL_OUTOF10: 0

## 2018-08-08 NOTE — CONSULTS
Department of General Surgery Consult    PATIENT NAME: Silver Blount OF BIRTH: 7/1/1928    ADMISSION DATE: 8/2/2018 11:01 AM      TODAY'S DATE: 8/8/2018    Reason for Consult:  Acute cholecystitis    Chief Complaint: none    Requesting Physician:  unknown    HISTORY OF PRESENT ILLNESS:              The patient is a 80 y.o. male who presented with confusion and lethargy. Progressed to septic shock due to UTI/ureteral stone and underwent ureteral stent placement for hydronephrosis. Has been weaned off of pressors and extubated yesterday. Not responsive but CT obtained for persistent leukocytosis. This demonstrated gallbladder inflammation and gallstones.     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 [address] [facility] N/A 8/4/2018    BRONCHOSCOPY ALVEOLAR LAVAGE performed by Santa Liu MD at [facility] Right 8/3/2018    CYSTOSCOPY URETERAL STENT INSERTION performed by Jayson Brar MD at [place] [address]       Current Medications:   Current Facility-Administered Medications: docusate (COLACE) 50 MG/5ML liquid 100 mg, 100 mg, Oral, BID  potassium chloride 20 mEq/50 mL IVPB (Central Line), 20 mEq, Intravenous, PRN  magnesium sulfate 1 g in dextrose 5% 100 mL IVPB, 1 g, Intravenous, PRN  sodium phosphate 13.8 mmol in dextrose 5 % 250 mL IVPB, 0.16 mmol/kg, Intravenous, PRN **OR** sodium phosphate 27.57 mmol in dextrose 5 % 250 mL IVPB, 0.32 mmol/kg, Intravenous, PRN  pantoprazole (PROTONIX) injection 40 mg, 40 mg, Intravenous, Daily **AND** sodium chloride flush 0.9 % injection 10 mL, 10 mL, Intravenous, Daily  mupirocin (BACTROBAN) 2 % ointment, , Nasal, BID  piperacillin-tazobactam (ZOSYN) 3.375 g in dextrose 5 % 50 mL IVPB extended infusion (mini-bag), 3.375 g, Intravenous, Q8H  aspirin chewable tablet 81 mg, 81 mg, Oral, Daily  ipratropium-albuterol (DUONEB) nebulizer solution 1 ampule, 1 ampule, Inhalation, Q4H WA  apixaban (ELIQUIS) tablet 5 mg, 5 mg, Oral, BID  brimonidine (ALPHAGAN) 0.2 % ophthalmic solution 1 drop, 1 drop, Both Eyes, BID  dorzolamide-timolol (COSOPT) 22.3-6.8 MG/ML ophthalmic solution 1 drop, 1 drop, Both Eyes, BID  latanoprost (XALATAN) 0.005 % ophthalmic solution 1 drop, 1 drop, Both Eyes, Nightly  sodium chloride flush 0.9 % injection 10 mL, 10 mL, Intravenous, 2 times per day  sodium chloride flush 0.9 % injection 10 mL, 10 mL, Intravenous, PRN  magnesium hydroxide (MILK OF MAGNESIA) 400 MG/5ML suspension 30 mL, 30 mL, Oral, Daily PRN  ondansetron (ZOFRAN) injection 4 mg, 4 mg, Intravenous, Q6H PRN  lactobacillus (CULTURELLE) capsule 1 capsule, 1 capsule, Oral, BID WC  Prior to Admission medications    Medication Sig Start Date End Date Taking?  Authorizing Provider   apixaban (ELIQUIS) 5 MG TABS tablet Take 2 tablets by mouth 2 times daily 6/18/15   Yang Lynn MD   apixaban Jethro Beckie) 5 MG TABS tablet Take 1 tablet by mouth 2 times daily 6/24/15   Yang Lynn MD   ferrous sulfate 325 (65 FE) MG EC tablet Take 1 tablet by mouth daily (with breakfast) 6/18/15   Yang Lynn MD   latanoprost (XALATAN) 0.005 % ophthalmic solution   Place 1 drop

## 2018-08-08 NOTE — PROGRESS NOTES
CT ABDOMEN PELVIS WO CONTRAST Additional Contrast? None   Final Result   1. 4 mm obstructing nephrolithiasis within the right proximal ureter causing   moderate hydronephrosis. 2. Bilateral lower lobe pneumonia. Recommend chest radiograph in 8 weeks to   confirm resolution. 3. Mildly dilated appendix without periappendiceal inflammation can be seen   in the setting of early acute appendicitis. 4. Unchanged 2.9 cm pancreatic cystic lesion. 5. Fecal impaction. XR CHEST PORTABLE   Final Result   Curvilinear opacity within the medial right lung base, which could represent   atelectasis, pneumonia, or aspiration. XR CHEST PORTABLE   Final Result   1. Right internal jugular catheter terminating in the mid SVC. CT HEAD WO CONTRAST   Final Result   No acute intracranial abnormality. FLUORO FOR SURGICAL PROCEDURES    (Results Pending)     Results for Bill Junior (MRN [id number]) as of 8/8/2018 18:15   Ref.  Range 8/7/2018 18:38 8/8/2018 05:50   Sodium Latest Ref Range: 136 - 145 mmol/L 141 141   Potassium Latest Ref Range: 3.5 - 5.1 mmol/L 2.6 (LL) 2.6 (LL)   Chloride Latest Ref Range: 99 - 110 mmol/L 104 105   CO2 Latest Ref Range: 21 - 32 mmol/L 29 28   BUN Latest Ref Range: 7 - 20 mg/dL 11 10   Creatinine Latest Ref Range: 0.8 - 1.3 mg/dL 0.9 1.0   Anion Gap Latest Ref Range: 3 - 16  8 8   GFR Non- Latest Ref Range: >60  >60 >60   GFR  Latest Ref Range: >60  >60 >60   Magnesium Latest Ref Range: 1.80 - 2.40 mg/dL 2.40 2.40   Lactic Acid Latest Ref Range: 0.4 - 2.0 mmol/L 1.1    Glucose Latest Ref Range: 70 - 99 mg/dL 92 61 (L)   Calcium Latest Ref Range: 8.3 - 10.6 mg/dL 7.8 (L) 7.8 (L)   Phosphorus Latest Ref Range: 2.5 - 4.9 mg/dL 1.5 (L) 2.3 (L)   Total Protein Latest Ref Range: 6.4 - 8.2 g/dL 5.6 (L) 5.4 (L)   Albumin Latest Ref Range: 3.4 - 5.0 g/dL 2.0 (L) 1.8 (L)   Globulin Latest Units: g/dL 3.6    Albumin/Globulin Ratio Latest Ref

## 2018-08-08 NOTE — PROGRESS NOTES
RESPIRATORY THERAPY ASSESSMENT    Name:  Danielito Blair 89 Record Number:  [id number]  Age: 80 y.o. Gender: male  : 1928  Today's Date:  2018  Room:  [address] [place] [address]    Assessment     Is the patient being admitted for a COPD or Asthma exacerbation? No   (If yes the patient will be seen every 4 hours for the first 24 hours and then reassessed)    Patient Admission Diagnosis      Allergies  No Known Allergies    Minimum Predicted Vital Capacity:     NA          Actual Vital Capacity:      NA - Pt unable          Pulmonary History:No history  Home Oxygen Therapy:  room air  Home Respiratory Therapy:None   Current Respiratory Therapy:  Duoneb Q4WA  Treatment Type: Vibratory Mucous Clearing Therapy or Intervention  Medications: Albuterol/Ipratropium    Respiratory Severity Index(RSI)   Patients with orders for inhalation medications, oxygen, or any therapeutic treatment modality will be placed on Respiratory Protocol. They will be assessed with the first treatment and at least every 72 hours thereafter. The following severity scale will be used to determine frequency of treatment intervention.     Smoking History: No Smoking History = 0    Social History  Social History   Substance Use Topics    Smoking status: Never Smoker    Smokeless tobacco: Never Used    Alcohol use No       Recent Surgical History: Lower Abdominal = 2  Past Surgical History  Past Surgical History:   Procedure Laterality Date    BRONCHOSCOPY N/A 2018    BRONCHOSCOPY performed by Veva Schilder, MD at [address] [facility] [address] [facility] N/A 2018    BRONCHOSCOPY ALVEOLAR LAVAGE performed by Veva Schilder, MD at [facility] 8/3/2018    CYSTOSCOPY URETERAL STENT INSERTION performed by Marco A Woodward MD at [facility]       Level of Consciousness: Lethargic = 3    Level of Activity: Non weight bearing- transfers bed to chair only = 3    Respiratory Pattern:

## 2018-08-08 NOTE — PROGRESS NOTES
Infectious Disease Follow up Notes    CC :  Sepsis, bacteremia, obstructing ureteral stone       Antibiotics:   Zosyn 3.375 q8    Admit Date:   8/2/2018  Hospital Day: 7    Subjective:   Remains NPO  On 2L NC. No fever   No BM. No history from patient possible    Objective:     Patient Vitals for the past 8 hrs:   BP Pulse Resp SpO2 Weight   08/08/18 1156 - - 18 96 % -   08/08/18 1056 - 86 24 95 % -   08/08/18 0814 - - 19 96 % -   08/08/18 0700 127/63 92 20 95 % -   08/08/18 0600 108/61 90 24 96 % 182 lb 5.1 oz (82.7 kg)       EXAM:  General:  Lethargic but responds to voice and following simple commands  HEENT:  NCAT, muddy sclera, pupils reactive. Dry oral mucosa, poor dentition   NECK:  supple  LUNGS:  Coarse ventilated breath sounds, equal grady   CV:    RRR without murmur  ABD:  Hypoactive bowel sounds.   Distended, tender  EXT:  No acute rash       LINE: R IJ CVL site ok       Scheduled Meds:   docusate  100 mg Oral BID    pantoprazole  40 mg Intravenous Daily    And    sodium chloride flush  10 mL Intravenous Daily    mupirocin   Nasal BID    piperacillin-tazobactam  3.375 g Intravenous Q8H    aspirin  81 mg Oral Daily    ipratropium-albuterol  1 ampule Inhalation Q4H WA    apixaban  5 mg Oral BID    brimonidine  1 drop Both Eyes BID    dorzolamide-timolol  1 drop Both Eyes BID    latanoprost  1 drop Both Eyes Nightly    sodium chloride flush  10 mL Intravenous 2 times per day    lactobacillus  1 capsule Oral BID WC          Data Review:    Lab Results   Component Value Date    WBC 17.5 (H) 08/08/2018    HGB 10.9 (L) 08/08/2018    HCT 33.4 (L) 08/08/2018    MCV 89.9 08/08/2018    PLT 94 (L) 08/08/2018     Lab Results   Component Value Date    CREATININE 1.0 08/08/2018    BUN 10 08/08/2018     08/08/2018    K 2.6 (LL) 08/08/2018     08/08/2018    CO2 28 08/08/2018       Hepatic Function Panel:   Lab Results   Component Value Date    ALKPHOS 302 08/08/2018     08/08/2018     08/08/2018    PROT 5.4 08/08/2018    BILITOT 2.2 08/08/2018    BILIDIR 1.9 08/08/2018    IBILI 0.3 08/08/2018    LABALBU 1.8 08/08/2018         MICRO:  8/2 BC x2 E coli, Pseudomonas  ESCHERICHIA COLI   Antibiotic Interpretation HARRIETT Unit   amoxicillin-clavulanate Sensitive <=8/4 mcg/mL   ampicillin Resistant >16 mcg/mL   ceFAZolin Resistant >16 mcg/mL   cefOXitin Sensitive <=8 mcg/mL   cefTRIAXone Resistant 32 mcg/mL   cefepime Sensitive <=2 mcg/mL   cefotaxime Intermediate 16 mcg/mL   cefuroxime Resistant >16 mcg/mL   ciprofloxacin Resistant >2 mcg/mL   gentamicin Sensitive <=4 mcg/mL   meropenem Sensitive <=1 mcg/mL   piperacillin-tazobactam Sensitive <=16 mcg/mL   trimethoprim-sulfamethoxazole Resistant >2/38 mcg/mL     8/3  UC E coli, Pseudomonas   ESCHERICHIA COLI   Antibiotic Interpretation HARRIETT Unit   amoxicillin-clavulanate Sensitive <=8/4 mcg/mL   ampicillin Resistant >16 mcg/mL   ceFAZolin Resistant 16 mcg/mL   Comment: NOTE: Cefazolin should only be used for uncomplicated UTI        for E.coli, Klebsiella pneumoniae or Proteus mirabilis.    cefOXitin Sensitive <=8 mcg/mL   cefTRIAXone Resistant 8 mcg/mL   cefepime Sensitive <=2 mcg/mL   cefotaxime Sensitive 4 mcg/mL   cefuroxime Resistant >16 mcg/mL   ciprofloxacin Resistant >2 mcg/mL   gentamicin Sensitive <=4 mcg/mL   meropenem Sensitive <=1 mcg/mL   nitrofurantoin Sensitive <=32 mcg/mL   piperacillin-tazobactam Sensitive <=16 mcg/mL   trimethoprim-sulfamethoxazole Resistant >2/38 mcg/mL         PSEUDOMONAS AERUGINOSA   Antibiotic Interpretation HARRIETT Unit   cefepime Sensitive <=2 mcg/mL   ciprofloxacin Sensitive <=1 mcg/mL   gentamicin Sensitive <=4 mcg/mL   meropenem Sensitive <=1 mcg/mL   piperacillin-tazobactam Sensitive <=16 mcg/mL   tobramycin Sensitive <=4 mcg/mL        8/4 BC x1 NGTD   Resp cx with normal respiratory nimo       IMAGING:  CT abd 8/2:  Impression acidosis 08/02/2018    Elevated troponin 08/02/2018    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    Altered mental status     Infection due to ESBL-producing Escherichia coli 06/19/2015    E coli bacteremia 06/17/2015    Leukocytosis 06/15/2015    Syncope and collapse     Complicated UTI (urinary tract infection)     Sepsis ([facility] [address] 06/13/2015    Lower urinary tract infectious disease 06/13/2015     Overview Note:     replace inactive diagnosis      Debility 06/13/2015     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 LFTs are abnormal.  Surgery following. Family does not want surgery or aggressive interventions      GONSALO, resolved     Possible pneumonia  S/p bronch 8/4, 8/6, cultures negative to date     Thrombocytopenia suspect from sepsis, stable     NKDA     Plan:   Continue Zosyn which is appropriate for the initial  pathology/bacteremia as well as possible cholecystitis. However, he does not seem to be improving with medical therapy    Involve Palliative care  I d/w dtr Vidya poor prognosis.   She indicates they may be moving toward comfort care/hospice soon     D/w RN         Conor Burks MD  Phone: [phone]   Fax : [phone]

## 2018-08-08 NOTE — PROGRESS NOTES
Hospitalist Progress Note      PCP: Lali Hartman    Date of Admission: 8/2/2018    Chief Complaint: unresponsive     Hospital Course:   admitted with septic shock, high grade fever, acute kidney injury (KDIGO stage 3), acute encephalopathy required  multiple pressors and mechanical ventilation 30% FiO2 AC/VC.   CT abd showed obstructing stone.  Had cystoscopy and right ureteral stent (Dr. Cali Cardenas) to relieve obstruction of right kidney. Blood cultures are positive for E coli and Pseudomonas, UC with GNR. Prior history of UTI with bacteremia, E coli sensitive to cefepime.      Subjective:       Extubated yesterday, sating well on 2L . Pt lethargic and somnolent today. Not tolerating TF  with high residuals per RN.        Medications:  Reviewed    Infusion Medications     Scheduled Medications    docusate  100 mg Oral BID    pantoprazole  40 mg Intravenous Daily    And    sodium chloride flush  10 mL Intravenous Daily    mupirocin   Nasal BID    piperacillin-tazobactam  3.375 g Intravenous Q8H    aspirin  81 mg Oral Daily    ipratropium-albuterol  1 ampule Inhalation Q4H WA    apixaban  5 mg Oral BID    brimonidine  1 drop Both Eyes BID    dorzolamide-timolol  1 drop Both Eyes BID    latanoprost  1 drop Both Eyes Nightly    sodium chloride flush  10 mL Intravenous 2 times per day    lactobacillus  1 capsule Oral BID WC     PRN Meds: potassium chloride, magnesium sulfate, sodium phosphate IVPB **OR** sodium phosphate IVPB, sodium chloride flush, magnesium hydroxide, ondansetron      Intake/Output Summary (Last 24 hours) at 08/08/18 1401  Last data filed at 08/08/18 1056   Gross per 24 hour   Intake              720 ml   Output             1425 ml   Net             -705 ml       Physical Exam Performed:    /63   Pulse 86   Temp 98.4 °F (36.9 °C) (Oral)   Resp 18   Ht 5' 9\" (1.753 m)   Wt 182 lb 5.1 oz (82.7 kg)   SpO2 96%   BMI 26.92 kg/m²     General appearance: lethargic but awake pressure. Stress ulcer prophylaxis with pantoprazole (Protonix). Iron deficiency anemia continues on ferrous sulfate and ascorbic acid. Hypokalemia and hypomagnesemia:   monitoring and replacing as needed     Elevated troponin likely secondary to sepsis and demand ischemia. Cardio recs noted and appreciated. Not candidate for cath lab due to depressed mental status, thrombocytopenia, and sepsis       Elevated LFTs : likely due to profound sepsis, hypotension last week  or possibly cholecystitis. Gen surgery assisting. NPO and IV abx. Follow LFTs      DVT Prophylaxis:  On eliquis      Diet: Diet NPO Effective Now  Code Status: DNR-CCA      Dispo -  Overall poor prognosis. Palliative care to discuss with family tomorrow- ? Hospice .  Continue ICU       Lizabeth Galvan MD

## 2018-08-09 LAB
ALBUMIN SERPL-MCNC: 1.7 G/DL (ref 3.4–5)
ALP BLD-CCNC: 309 U/L (ref 40–129)
ALT SERPL-CCNC: 178 U/L (ref 10–40)
ANION GAP SERPL CALCULATED.3IONS-SCNC: 9 MMOL/L (ref 3–16)
AST SERPL-CCNC: 163 U/L (ref 15–37)
BILIRUB SERPL-MCNC: 2.2 MG/DL (ref 0–1)
BILIRUBIN DIRECT: 1.9 MG/DL (ref 0–0.3)
BILIRUBIN, INDIRECT: 0.3 MG/DL (ref 0–1)
BUN BLDV-MCNC: 10 MG/DL (ref 7–20)
CALCIUM SERPL-MCNC: 7.8 MG/DL (ref 8.3–10.6)
CHLORIDE BLD-SCNC: 110 MMOL/L (ref 99–110)
CO2: 26 MMOL/L (ref 21–32)
CREAT SERPL-MCNC: 1 MG/DL (ref 0.8–1.3)
CULTURE, BLOOD 2: NORMAL
GFR AFRICAN AMERICAN: >60
GFR NON-AFRICAN AMERICAN: >60
GLUCOSE BLD-MCNC: 45 MG/DL (ref 70–99)
GLUCOSE BLD-MCNC: 50 MG/DL (ref 70–99)
GLUCOSE BLD-MCNC: 55 MG/DL (ref 70–99)
GLUCOSE BLD-MCNC: 74 MG/DL (ref 70–99)
GLUCOSE BLD-MCNC: 81 MG/DL (ref 70–99)
GLUCOSE BLD-MCNC: 95 MG/DL (ref 70–99)
HCT VFR BLD CALC: 32.7 % (ref 40.5–52.5)
HEMOGLOBIN: 11.2 G/DL (ref 13.5–17.5)
MAGNESIUM: 2.4 MG/DL (ref 1.8–2.4)
MCH RBC QN AUTO: 30.4 PG (ref 26–34)
MCHC RBC AUTO-ENTMCNC: 34.2 G/DL (ref 31–36)
MCV RBC AUTO: 89.1 FL (ref 80–100)
PDW BLD-RTO: 14.2 % (ref 12.4–15.4)
PERFORMED ON: ABNORMAL
PERFORMED ON: ABNORMAL
PERFORMED ON: NORMAL
PLATELET # BLD: 124 K/UL (ref 135–450)
PMV BLD AUTO: 10.4 FL (ref 5–10.5)
POTASSIUM SERPL-SCNC: 3.1 MMOL/L (ref 3.5–5.1)
POTASSIUM SERPL-SCNC: 3.6 MMOL/L (ref 3.5–5.1)
RBC # BLD: 3.67 M/UL (ref 4.2–5.9)
SODIUM BLD-SCNC: 145 MMOL/L (ref 136–145)
TOTAL PROTEIN: 5.7 G/DL (ref 6.4–8.2)
WBC # BLD: 14.8 K/UL (ref 4–11)

## 2018-08-09 PROCEDURE — 80048 BASIC METABOLIC PNL TOTAL CA: CPT

## 2018-08-09 PROCEDURE — 31720 CLEARANCE OF AIRWAYS: CPT

## 2018-08-09 PROCEDURE — 6360000002 HC RX W HCPCS: Performed by: INTERNAL MEDICINE

## 2018-08-09 PROCEDURE — 6370000000 HC RX 637 (ALT 250 FOR IP): Performed by: FAMILY MEDICINE

## 2018-08-09 PROCEDURE — 6370000000 HC RX 637 (ALT 250 FOR IP): Performed by: INTERNAL MEDICINE

## 2018-08-09 PROCEDURE — 2580000003 HC RX 258: Performed by: INTERNAL MEDICINE

## 2018-08-09 PROCEDURE — 83735 ASSAY OF MAGNESIUM: CPT

## 2018-08-09 PROCEDURE — 2500000003 HC RX 250 WO HCPCS: Performed by: INTERNAL MEDICINE

## 2018-08-09 PROCEDURE — 2700000000 HC OXYGEN THERAPY PER DAY

## 2018-08-09 PROCEDURE — 83036 HEMOGLOBIN GLYCOSYLATED A1C: CPT

## 2018-08-09 PROCEDURE — 2580000003 HC RX 258: Performed by: FAMILY MEDICINE

## 2018-08-09 PROCEDURE — 80076 HEPATIC FUNCTION PANEL: CPT

## 2018-08-09 PROCEDURE — 84132 ASSAY OF SERUM POTASSIUM: CPT

## 2018-08-09 PROCEDURE — 85027 COMPLETE CBC AUTOMATED: CPT

## 2018-08-09 PROCEDURE — 2060000000 HC ICU INTERMEDIATE R&B

## 2018-08-09 PROCEDURE — C9113 INJ PANTOPRAZOLE SODIUM, VIA: HCPCS | Performed by: INTERNAL MEDICINE

## 2018-08-09 PROCEDURE — 94761 N-INVAS EAR/PLS OXIMETRY MLT: CPT

## 2018-08-09 PROCEDURE — 94640 AIRWAY INHALATION TREATMENT: CPT

## 2018-08-09 PROCEDURE — 99232 SBSQ HOSP IP/OBS MODERATE 35: CPT | Performed by: INTERNAL MEDICINE

## 2018-08-09 PROCEDURE — 94668 MNPJ CHEST WALL SBSQ: CPT

## 2018-08-09 PROCEDURE — 36592 COLLECT BLOOD FROM PICC: CPT

## 2018-08-09 RX ORDER — NICOTINE POLACRILEX 4 MG
15 LOZENGE BUCCAL PRN
Status: DISCONTINUED | OUTPATIENT
Start: 2018-08-09 | End: 2018-08-14 | Stop reason: HOSPADM

## 2018-08-09 RX ORDER — DEXTROSE MONOHYDRATE 25 G/50ML
12.5 INJECTION, SOLUTION INTRAVENOUS PRN
Status: DISCONTINUED | OUTPATIENT
Start: 2018-08-09 | End: 2018-08-14 | Stop reason: HOSPADM

## 2018-08-09 RX ORDER — DEXTROSE MONOHYDRATE 50 MG/ML
100 INJECTION, SOLUTION INTRAVENOUS PRN
Status: DISCONTINUED | OUTPATIENT
Start: 2018-08-09 | End: 2018-08-14 | Stop reason: HOSPADM

## 2018-08-09 RX ADMIN — APIXABAN 5 MG: 5 TABLET, FILM COATED ORAL at 20:01

## 2018-08-09 RX ADMIN — BRIMONIDINE TARTRATE 1 DROP: 2 SOLUTION OPHTHALMIC at 20:02

## 2018-08-09 RX ADMIN — PIPERACILLIN AND TAZOBACTAM 3.38 G: 3; .375 INJECTION, POWDER, FOR SOLUTION INTRAVENOUS at 13:48

## 2018-08-09 RX ADMIN — PIPERACILLIN AND TAZOBACTAM 3.38 G: 3; .375 INJECTION, POWDER, FOR SOLUTION INTRAVENOUS at 06:22

## 2018-08-09 RX ADMIN — BRIMONIDINE TARTRATE 1 DROP: 2 SOLUTION OPHTHALMIC at 08:41

## 2018-08-09 RX ADMIN — POTASSIUM CHLORIDE, DEXTROSE MONOHYDRATE AND SODIUM CHLORIDE: 150; 5; 450 INJECTION, SOLUTION INTRAVENOUS at 17:19

## 2018-08-09 RX ADMIN — SODIUM CHLORIDE, PRESERVATIVE FREE 10 ML: 5 INJECTION INTRAVENOUS at 20:01

## 2018-08-09 RX ADMIN — DOCUSATE SODIUM 100 MG: 50 LIQUID ORAL at 08:46

## 2018-08-09 RX ADMIN — DOCUSATE SODIUM 100 MG: 50 LIQUID ORAL at 20:01

## 2018-08-09 RX ADMIN — ASPIRIN 81 MG 81 MG: 81 TABLET ORAL at 08:46

## 2018-08-09 RX ADMIN — APIXABAN 5 MG: 5 TABLET, FILM COATED ORAL at 08:46

## 2018-08-09 RX ADMIN — POTASSIUM CHLORIDE 20 MEQ: 400 INJECTION, SOLUTION INTRAVENOUS at 09:04

## 2018-08-09 RX ADMIN — IPRATROPIUM BROMIDE AND ALBUTEROL SULFATE 1 AMPULE: .5; 3 SOLUTION RESPIRATORY (INHALATION) at 12:01

## 2018-08-09 RX ADMIN — LATANOPROST 1 DROP: 50 SOLUTION OPHTHALMIC at 20:02

## 2018-08-09 RX ADMIN — PIPERACILLIN AND TAZOBACTAM 3.38 G: 3; .375 INJECTION, POWDER, FOR SOLUTION INTRAVENOUS at 23:12

## 2018-08-09 RX ADMIN — IPRATROPIUM BROMIDE AND ALBUTEROL SULFATE 1 AMPULE: .5; 3 SOLUTION RESPIRATORY (INHALATION) at 20:40

## 2018-08-09 RX ADMIN — IPRATROPIUM BROMIDE AND ALBUTEROL SULFATE 1 AMPULE: .5; 3 SOLUTION RESPIRATORY (INHALATION) at 15:28

## 2018-08-09 RX ADMIN — SODIUM CHLORIDE, PRESERVATIVE FREE 10 ML: 5 INJECTION INTRAVENOUS at 08:42

## 2018-08-09 RX ADMIN — DORZOLAMIDE HYDROCHLORIDE AND TIMOLOL MALEATE 1 DROP: 20; 5 SOLUTION/ DROPS OPHTHALMIC at 20:02

## 2018-08-09 RX ADMIN — Medication 1 CAPSULE: at 08:46

## 2018-08-09 RX ADMIN — POTASSIUM CHLORIDE 20 MEQ: 400 INJECTION, SOLUTION INTRAVENOUS at 10:38

## 2018-08-09 RX ADMIN — PANTOPRAZOLE SODIUM 40 MG: 40 INJECTION, POWDER, FOR SOLUTION INTRAVENOUS at 08:42

## 2018-08-09 RX ADMIN — IPRATROPIUM BROMIDE AND ALBUTEROL SULFATE 1 AMPULE: .5; 3 SOLUTION RESPIRATORY (INHALATION) at 08:15

## 2018-08-09 RX ADMIN — SODIUM CHLORIDE, PRESERVATIVE FREE 10 ML: 5 INJECTION INTRAVENOUS at 08:45

## 2018-08-09 RX ADMIN — Medication 1 CAPSULE: at 17:13

## 2018-08-09 RX ADMIN — DORZOLAMIDE HYDROCHLORIDE AND TIMOLOL MALEATE 1 DROP: 20; 5 SOLUTION/ DROPS OPHTHALMIC at 12:29

## 2018-08-09 RX ADMIN — DEXTROSE MONOHYDRATE 100 ML/HR: 50 INJECTION, SOLUTION INTRAVENOUS at 08:49

## 2018-08-09 NOTE — FLOWSHEET NOTE
08/09/18 0400   Assessment   Charting Type Shift assessment   Neurological   Neuro (WDL) X   Level of Consciousness 0   Orientation Level MARCELLO   Cognition MARCELLO   Language Nods/gestures appropriately   Size R Pupil (mm) 3   R Pupil Shape Round   R Pupil Reaction Brisk   Size L Pupil (mm) 3   L Pupil Shape Round   L Pupil Reaction Brisk   R Hand  Moderate   L Hand  Moderate   R Foot Dorsiflexion Weak   L Foot Dorsiflexion Weak   R Foot Plantar Flexion Weak   L Foot Plantar Flexion Weak   RUE Motor Response Responds to command   Sensation RUE MARCELLO   LUE Motor Response Responds to command   Sensation LUE MARCELLO   RLE Motor Response Responds to command   Sensation RLE MARCELLO   LLE Motor Response Responds to command   Sensation LLE MARCELLO   Gag MARCELLO   Tongue Deviation MARCELLO   Edward Coma Scale   Eye Opening 3   Best Verbal Response 3   Best Motor Response 5   Edward Coma Scale Score 11   HEENT   HEENT (WDL) X   Right Eye Impaired vision   Left Eye Impaired vision   Teeth Missing teeth   Respiratory   Respiratory (WDL) X   Respiratory Pattern Regular   Respiratory Depth Normal   Respiratory Quality/Effort Unlabored   Chest Assessment Chest expansion symmetrical;Trachea midline   L Breath Sounds Rhonchi   R Breath Sounds Rhonchi   Breath Sounds   Right Upper Lobe Rhonchi   Right Middle Lobe Rhonchi   Right Lower Lobe Rhonchi   Left Upper Lobe Rhonchi   Left Lower Lobe Rhonchi   Cough/Sputum   Cough Barky   Frequency Occasional   Cardiac   Cardiac (WDL) WDL   Cardiac Regularity Regular   Heart Sounds S1, S2   Cardiac Rhythm NSR   Cardiac Monitor   Telemetry Monitor On Yes   Telemetry Audible Yes   Telemetry Alarms Set Yes   Telemetry Box Number [id number]   Gastrointestinal   Abdominal (WDL) X   Abdomen Inspection Rounded; Soft   Bowel Sounds (All Quadrants) Hypoactive   Tenderness Nontender   Bowel Sounds   RUQ Bowel Sounds Hypoactive   LUQ Bowel Sounds Hypoactive   RLQ Bowel Sounds Hypoactive   LLQ Bowel Sounds Hypoactive   Peripheral Vascular   Peripheral Vascular (WDL) X   Edema Right lower extremity; Left lower extremity   RLE Edema +1;Pitting   LLE Edema +1;Pitting   Sacral Edema +2   Skin Color/Condition   Skin Color/Condition (WDL) WDL   Skin Integrity   Skin Integrity (WDL) WDL   Preventative Dressing Yes   Dressing Site Sacrum   Date Applied 08/08/18   Musculoskeletal   Musculoskeletal (WDL) X   RUE Contracture   LUE Contracture   RL Extremity Weakness   LL Extremity Weakness   Genitourinary   Genitourinary (WDL) X  (suprapubic catheter)   Flank Tenderness MARCELLO   Suprapubic Tenderness MARCELLO   Dysuria MARCELLO   Urine Assessment   Urine Color Red   Urine Odor No odor   Anus/Rectum   Anus/Rectum (WDL) WDL   Psychosocial   Psychosocial (WDL) X   Patient Behaviors Not interactive

## 2018-08-09 NOTE — PROGRESS NOTES
Hepatic Function Panel:   Lab Results   Component Value Date    ALKPHOS 309 08/09/2018     08/09/2018     08/09/2018    PROT 5.7 08/09/2018    BILITOT 2.2 08/09/2018    BILIDIR 1.9 08/09/2018    IBILI 0.3 08/09/2018    LABALBU 1.7 08/09/2018         MICRO:  8/2 BC x2 E coli, Pseudomonas  ESCHERICHIA COLI   Antibiotic Interpretation HARRIETT Unit   amoxicillin-clavulanate Sensitive <=8/4 mcg/mL   ampicillin Resistant >16 mcg/mL   ceFAZolin Resistant >16 mcg/mL   cefOXitin Sensitive <=8 mcg/mL   cefTRIAXone Resistant 32 mcg/mL   cefepime Sensitive <=2 mcg/mL   cefotaxime Intermediate 16 mcg/mL   cefuroxime Resistant >16 mcg/mL   ciprofloxacin Resistant >2 mcg/mL   gentamicin Sensitive <=4 mcg/mL   meropenem Sensitive <=1 mcg/mL   piperacillin-tazobactam Sensitive <=16 mcg/mL   trimethoprim-sulfamethoxazole Resistant >2/38 mcg/mL     8/3  UC E coli, Pseudomonas   ESCHERICHIA COLI   Antibiotic Interpretation HARRIETT Unit   amoxicillin-clavulanate Sensitive <=8/4 mcg/mL   ampicillin Resistant >16 mcg/mL   ceFAZolin Resistant 16 mcg/mL   Comment: NOTE: Cefazolin should only be used for uncomplicated UTI        for E.coli, Klebsiella pneumoniae or Proteus mirabilis.    cefOXitin Sensitive <=8 mcg/mL   cefTRIAXone Resistant 8 mcg/mL   cefepime Sensitive <=2 mcg/mL   cefotaxime Sensitive 4 mcg/mL   cefuroxime Resistant >16 mcg/mL   ciprofloxacin Resistant >2 mcg/mL   gentamicin Sensitive <=4 mcg/mL   meropenem Sensitive <=1 mcg/mL   nitrofurantoin Sensitive <=32 mcg/mL   piperacillin-tazobactam Sensitive <=16 mcg/mL   trimethoprim-sulfamethoxazole Resistant >2/38 mcg/mL         PSEUDOMONAS AERUGINOSA   Antibiotic Interpretation HARRIETT Unit   cefepime Sensitive <=2 mcg/mL   ciprofloxacin Sensitive <=1 mcg/mL   gentamicin Sensitive <=4 mcg/mL   meropenem Sensitive <=1 mcg/mL   piperacillin-tazobactam Sensitive <=16 mcg/mL   tobramycin Sensitive <=4 mcg/mL        8/4 BC x1 NGTD   Resp cx with normal respiratory nimo 08/02/2018    GONSALO (acute kidney injury) ([facility] [address] 08/02/2018    Lactic acidosis 08/02/2018    Elevated troponin 08/02/2018    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    Altered mental status     Infection due to ESBL-producing Escherichia coli 06/19/2015    E coli bacteremia 06/17/2015    Leukocytosis 06/15/2015    Syncope and collapse     Complicated UTI (urinary tract infection)     Sepsis ([facility] [address] 06/13/2015    Lower urinary tract infectious disease 06/13/2015     Overview Note:     replace inactive diagnosis      Debility 06/13/2015     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 LFTs are abnormal.  Surgery following.   Family does not want surgery or aggressive interventions      GONSALO, resolved     Possible pneumonia  S/p bronch 8/4, 8/6, cultures negative to date     Thrombocytopenia suspect from sepsis, resolving      NKDA     Plan:   Continue Zosyn   Decline in WBC is reassuring, yet prognosis remains poor   Palliative care meeting with family later today            Koki Mercer MD  Phone: [phone]   Fax : [phone]

## 2018-08-09 NOTE — PROGRESS NOTES
Updated provided to [facility], RN for request of palliative care. Nurse spoke with Bandar Raymundo (Deepak Escobar) regarding palliative care involvement to determine goals of family for patient; POC to provide comfort.       Chet Owen RN

## 2018-08-09 NOTE — PROGRESS NOTES
9\" (1.753 m)   Wt 189 lb 2.5 oz (85.8 kg)   SpO2 95%   BMI 27.93 kg/m²     General appearance: lethargic, not in overt resp distress   HEENT: Pupils equal, round, Conjunctivae/corneas clear. Neck: Supple, with full range of motion. No jugular venous distention. Trachea midline. Respiratory: bibasilar rales   Cardiovascular: Regular rate and rhythm with normal S1/S2 without murmurs, rubs or gallops. Abdomen: Soft, non-tender, non-distended with normal bowel sounds. Musculoskeletal: No clubbing, cyanosis or edema bilaterally. Skin: Skin color, texture, turgor normal.  No rashes or lesions on exposed skin . Neurologic:  Somnolent but arousable, following simple commands, moving all extremities spontaneously  Psychiatric: unable to assess   Capillary Refill: Brisk,< 3 seconds   Peripheral Pulses: +2 palpable, equal bilaterally       Labs:   Recent Labs      08/07/18 1838 08/08/18   0550  08/09/18   0510   WBC  19.4*  17.5*  14.8*   HGB  11.6*  10.9*  11.2*   HCT  34.1*  33.4*  32.7*   PLT  92*  94*  124*     Recent Labs      08/07/18 1838  08/08/18   0550  08/09/18   0510   NA  141  141  145   K  2.6*  2.6*  3.1*   CL  104  105  110   CO2  29  28  26   BUN  11  10  10   CREATININE  0.9  1.0  1.0   CALCIUM  7.8*  7.8*  7.8*   PHOS  1.5*  2.3*   --      Recent Labs      08/07/18 1838 08/08/18   0550  08/09/18   0510   AST  177*  196*  163*   ALT  226*  213*  178*   BILIDIR   --   1.9*  1.9*   BILITOT  2.3*  2.2*  2.2*   ALKPHOS  338*  302*  309*     No results for input(s): INR in the last 72 hours. No results for input(s): Liz Pines in the last 72 hours.     Urinalysis:      Lab Results   Component Value Date    NITRU POSITIVE 08/02/2018    WBCUA >100 08/02/2018    BACTERIA 1+ 08/02/2018    RBCUA  08/02/2018    BLOODU MODERATE 08/02/2018    SPECGRAV 1.010 08/02/2018    GLUCOSEU Negative 08/02/2018       Radiology:  CT ABDOMEN PELVIS WO CONTRAST Additional Contrast? None   Final Result New inflammatory change surrounding the gallbladder, with gallbladder   distention, suggesting developing cholecystitis. Interval insertion of right-sided ureteral stent. There is decreased   right-sided hydronephrosis. .  Punctate stones remain in right kidney. Small stone marginates the ureteral stent in the proximal right ureter. Large bladder calculus appears unchanged. Scattered areas of colonic wall thickening are seen either due to incomplete   distention or superimposed colitis. Appendix is not well seen      Increased pleural effusions with increased lung consolidation      No change in cystic nodule in the region the pancreas         XR ABDOMEN (KUB) (SINGLE AP VIEW)   Final Result   Nasoenteric tube tip overlies the greater curvature of the stomach. XR CHEST PORTABLE   Final Result   Right IJ catheter has been pulled back with tip above the clavicle. Recommend repositioning. Some improvement in aeration of the lungs. XR CHEST PORTABLE   Final Result   Pleural effusions with increasing atelectasis and/or pneumonia. XR CHEST PORTABLE   Final Result   1. Bibasilar atelectasis and/or pneumonia. 2. The enteric tube is in good position in the stomach. XR ABDOMEN (KUB) (SINGLE AP VIEW)   Final Result   Successful placement of right ureteral stent. CT ABDOMEN PELVIS WO CONTRAST Additional Contrast? None   Final Result   1. 4 mm obstructing nephrolithiasis within the right proximal ureter causing   moderate hydronephrosis. 2. Bilateral lower lobe pneumonia. Recommend chest radiograph in 8 weeks to   confirm resolution. 3. Mildly dilated appendix without periappendiceal inflammation can be seen   in the setting of early acute appendicitis. 4. Unchanged 2.9 cm pancreatic cystic lesion. 5. Fecal impaction.          XR CHEST PORTABLE   Final Result   Curvilinear opacity within the medial right lung base, which could represent   atelectasis,

## 2018-08-09 NOTE — PROGRESS NOTES
Progress Note    HISTORY     CC:  AMS      Subjective/     HPI:    Kidney function is recovering nicely, good urine output. No sob. ROS:  Constitutional:  No fevers,  Respiratory:  No wheezing, Off vent   Skin:  No rash  :  Chaidez, urine output is good. Clear color     Social Hx:    - No family at bedside     Past Medical and Surgical History:  - Reviewed, no changes     EXAM       Objective/     Vitals:    08/09/18 0815 08/09/18 0831 08/09/18 1202 08/09/18 1342   BP:  136/86  110/68   Pulse:  99  91   Resp:  22  20   Temp:  98.8 °F (37.1 °C)  99.3 °F (37.4 °C)   TempSrc:  Axillary  Axillary   SpO2: 98% 95% 99% 98%   Weight:       Height:         24HR INTAKE/OUTPUT:      Intake/Output Summary (Last 24 hours) at 08/09/18 1347  Last data filed at 08/09/18 1235   Gross per 24 hour   Intake           288.33 ml   Output              800 ml   Net          -511.67 ml     Constitutional:  Ill-appearing  Eyes:  Pupils reactive, sclera clear   Neck:  Normal thyroid, no masses   Cardiovascular:  Regular, no rub  Respiratory:  Off vent, no wheezing  Psychiatry:  Sedated   Abdomen: +bs, soft, nt, no masses   Musculoskeletal: trace LE edema, no clubbing   Lymphatics:  No LAD in neck, no supraclavicular nodes   :  Chaidez       MEDICAL DECISION MAKING       Data/  Recent Labs      08/07/18 1838  08/08/18   0550  08/09/18   0510   WBC  19.4*  17.5*  14.8*   HGB  11.6*  10.9*  11.2*   HCT  34.1*  33.4*  32.7*   MCV  89.5  89.9  89.1   PLT  92*  94*  124*     Recent Labs      08/07/18   1838  08/08/18   0550  08/09/18   0510   NA  141  141  145   K  2.6*  2.6*  3.1*   CL  104  105  110   CO2  29  28  26   GLUCOSE  92  61*  50*   PHOS  1.5*  2.3*   --    MG  2.40  2.40   --    BUN  11  10  10   CREATININE  0.9  1.0  1.0   LABGLOM  >60  >60  >60   GFRAA  >60  >60  >60       Assessment/        Acute Kidney Injury:  KDIGO stage 3  - Etiology:  ATN / septic shock.   Some contribution of the obstructed right kidney but clearly systemic intrinsic renal injury  - Clinical:  S/p right stent placement by Dr. Keily Shelton. Improving. Scr is 1.0 with good urine output     Metabolic Acidosis:  Resolved.       Septic Shock:  Right ureteral stent placed by Urology.   Pressors off     Hypokalemia, hypophosphatemia: Prn replacement       Plan/     Monitoring off of IVF's, pressors with improved hemodynamics  Follow labs    No further recs at this time  Will sign off for now  Please call with questions

## 2018-08-09 NOTE — PROGRESS NOTES
Transfer to [facility] from [address] [facility] and [address] RN performed complete skin assessment on transfer. Assessment revealed intact skin. Upon transferring the patient to the ordered level of care, patients medications were gathered from the following locations and given to the receiving nurse during bedside report. Lock Box: YES  Pyxis Bin: YES  Pyxis Refrigerator: YES  Tube System: YES    The following paperwork was transferred with the patient:    12 hour chart check: YES  Blue Medication Book: YES    Patient belongings transferred with patient include: ring    Tele monitor assigned to patient, in place for transfer. [facility] notified of transfer. LDAs reviewed and documented. Continuous Pulse ox in place (if applicable): NO    MD notified via [employer].     Family notified of transfer, spoke with: will call in AM

## 2018-08-09 NOTE — CARE COORDINATION
8/9/18 Palliative care meeting with family at 1:30 today will follow up on the outcome of that conversation.

## 2018-08-09 NOTE — FLOWSHEET NOTE
Nontender   Bowel Sounds   RUQ Bowel Sounds Hypoactive   LUQ Bowel Sounds Hypoactive   RLQ Bowel Sounds Hypoactive   LLQ Bowel Sounds Hypoactive   Peripheral Vascular   Peripheral Vascular (WDL) X   Edema Generalized   Edema Generalized Non-pitting;+2   Sacral Edema +2   Skin Color/Condition   Skin Color/Condition (WDL) WDL   Skin Integrity   Skin Integrity (WDL) WDL   Musculoskeletal   Musculoskeletal (WDL) X  (contracted)   RUE Limited movement   LUE Limited movement   RL Extremity Limited movement   LL Extremity Limited movement   Genitourinary   Genitourinary (WDL) X  (suprapubic catheter)   Flank Tenderness MARCELLO   Suprapubic Tenderness MARCELLO   Dysuria MARCELLO   Urine Assessment   Urine Color Lluvia   Urine Appearance Clear   Anus/Rectum   Anus/Rectum (WDL) WDL   Psychosocial   Psychosocial (WDL) X   Patient Behaviors Cooperative;Flat affect

## 2018-08-10 LAB
ANION GAP SERPL CALCULATED.3IONS-SCNC: 8 MMOL/L (ref 3–16)
BUN BLDV-MCNC: 9 MG/DL (ref 7–20)
CALCIUM SERPL-MCNC: 7.6 MG/DL (ref 8.3–10.6)
CHLORIDE BLD-SCNC: 106 MMOL/L (ref 99–110)
CO2: 26 MMOL/L (ref 21–32)
CREAT SERPL-MCNC: 1 MG/DL (ref 0.8–1.3)
ESTIMATED AVERAGE GLUCOSE: 105.4 MG/DL
GFR AFRICAN AMERICAN: >60
GFR NON-AFRICAN AMERICAN: >60
GLUCOSE BLD-MCNC: 100 MG/DL (ref 70–99)
GLUCOSE BLD-MCNC: 108 MG/DL (ref 70–99)
GLUCOSE BLD-MCNC: 136 MG/DL (ref 70–99)
GLUCOSE BLD-MCNC: 48 MG/DL (ref 70–99)
GLUCOSE BLD-MCNC: 84 MG/DL (ref 70–99)
GLUCOSE BLD-MCNC: 84 MG/DL (ref 70–99)
GLUCOSE BLD-MCNC: 94 MG/DL (ref 70–99)
GLUCOSE BLD-MCNC: 96 MG/DL (ref 70–99)
HBA1C MFR BLD: 5.3 %
HCT VFR BLD CALC: 31.5 % (ref 40.5–52.5)
HEMOGLOBIN: 10.6 G/DL (ref 13.5–17.5)
MCH RBC QN AUTO: 29.9 PG (ref 26–34)
MCHC RBC AUTO-ENTMCNC: 33.7 G/DL (ref 31–36)
MCV RBC AUTO: 88.6 FL (ref 80–100)
PDW BLD-RTO: 14.3 % (ref 12.4–15.4)
PERFORMED ON: ABNORMAL
PERFORMED ON: NORMAL
PLATELET # BLD: 127 K/UL (ref 135–450)
PMV BLD AUTO: 10.4 FL (ref 5–10.5)
POTASSIUM SERPL-SCNC: 3.1 MMOL/L (ref 3.5–5.1)
RBC # BLD: 3.55 M/UL (ref 4.2–5.9)
SODIUM BLD-SCNC: 140 MMOL/L (ref 136–145)
WBC # BLD: 12.6 K/UL (ref 4–11)

## 2018-08-10 PROCEDURE — 6360000002 HC RX W HCPCS: Performed by: INTERNAL MEDICINE

## 2018-08-10 PROCEDURE — 2060000000 HC ICU INTERMEDIATE R&B

## 2018-08-10 PROCEDURE — 2700000000 HC OXYGEN THERAPY PER DAY

## 2018-08-10 PROCEDURE — 80048 BASIC METABOLIC PNL TOTAL CA: CPT

## 2018-08-10 PROCEDURE — 6370000000 HC RX 637 (ALT 250 FOR IP): Performed by: FAMILY MEDICINE

## 2018-08-10 PROCEDURE — 6370000000 HC RX 637 (ALT 250 FOR IP): Performed by: INTERNAL MEDICINE

## 2018-08-10 PROCEDURE — 94664 DEMO&/EVAL PT USE INHALER: CPT

## 2018-08-10 PROCEDURE — 94761 N-INVAS EAR/PLS OXIMETRY MLT: CPT

## 2018-08-10 PROCEDURE — C9113 INJ PANTOPRAZOLE SODIUM, VIA: HCPCS | Performed by: INTERNAL MEDICINE

## 2018-08-10 PROCEDURE — 36592 COLLECT BLOOD FROM PICC: CPT

## 2018-08-10 PROCEDURE — 6360000002 HC RX W HCPCS: Performed by: NURSE PRACTITIONER

## 2018-08-10 PROCEDURE — 31720 CLEARANCE OF AIRWAYS: CPT

## 2018-08-10 PROCEDURE — 2580000003 HC RX 258: Performed by: INTERNAL MEDICINE

## 2018-08-10 PROCEDURE — 6370000000 HC RX 637 (ALT 250 FOR IP)

## 2018-08-10 PROCEDURE — 2580000003 HC RX 258: Performed by: FAMILY MEDICINE

## 2018-08-10 PROCEDURE — 2500000003 HC RX 250 WO HCPCS: Performed by: INTERNAL MEDICINE

## 2018-08-10 PROCEDURE — 94640 AIRWAY INHALATION TREATMENT: CPT

## 2018-08-10 PROCEDURE — 85027 COMPLETE CBC AUTOMATED: CPT

## 2018-08-10 RX ORDER — FUROSEMIDE 10 MG/ML
20 INJECTION INTRAMUSCULAR; INTRAVENOUS ONCE
Status: COMPLETED | OUTPATIENT
Start: 2018-08-10 | End: 2018-08-10

## 2018-08-10 RX ORDER — MORPHINE SULFATE 2 MG/ML
2 INJECTION, SOLUTION INTRAMUSCULAR; INTRAVENOUS EVERY 4 HOURS PRN
Status: DISCONTINUED | OUTPATIENT
Start: 2018-08-10 | End: 2018-08-14 | Stop reason: HOSPADM

## 2018-08-10 RX ADMIN — DEXTROSE MONOHYDRATE 12.5 G: 25 INJECTION, SOLUTION INTRAVENOUS at 02:22

## 2018-08-10 RX ADMIN — Medication 1 CAPSULE: at 08:42

## 2018-08-10 RX ADMIN — IPRATROPIUM BROMIDE AND ALBUTEROL SULFATE 1 AMPULE: .5; 3 SOLUTION RESPIRATORY (INHALATION) at 08:57

## 2018-08-10 RX ADMIN — FUROSEMIDE 20 MG: 10 INJECTION, SOLUTION INTRAVENOUS at 03:01

## 2018-08-10 RX ADMIN — SODIUM CHLORIDE, PRESERVATIVE FREE 10 ML: 5 INJECTION INTRAVENOUS at 08:39

## 2018-08-10 RX ADMIN — DOCUSATE SODIUM 100 MG: 50 LIQUID ORAL at 08:42

## 2018-08-10 RX ADMIN — DORZOLAMIDE HYDROCHLORIDE AND TIMOLOL MALEATE 1 DROP: 20; 5 SOLUTION/ DROPS OPHTHALMIC at 21:34

## 2018-08-10 RX ADMIN — Medication 1 CAPSULE: at 17:15

## 2018-08-10 RX ADMIN — APIXABAN 5 MG: 5 TABLET, FILM COATED ORAL at 08:42

## 2018-08-10 RX ADMIN — IPRATROPIUM BROMIDE AND ALBUTEROL SULFATE 1 AMPULE: .5; 3 SOLUTION RESPIRATORY (INHALATION) at 16:51

## 2018-08-10 RX ADMIN — LATANOPROST 1 DROP: 50 SOLUTION OPHTHALMIC at 21:34

## 2018-08-10 RX ADMIN — MORPHINE SULFATE 2 MG: 2 INJECTION, SOLUTION INTRAMUSCULAR; INTRAVENOUS at 10:13

## 2018-08-10 RX ADMIN — BRIMONIDINE TARTRATE 1 DROP: 2 SOLUTION OPHTHALMIC at 21:34

## 2018-08-10 RX ADMIN — PIPERACILLIN AND TAZOBACTAM 3.38 G: 3; .375 INJECTION, POWDER, FOR SOLUTION INTRAVENOUS at 06:07

## 2018-08-10 RX ADMIN — PIPERACILLIN AND TAZOBACTAM 3.38 G: 3; .375 INJECTION, POWDER, FOR SOLUTION INTRAVENOUS at 14:46

## 2018-08-10 RX ADMIN — POTASSIUM CHLORIDE, DEXTROSE MONOHYDRATE AND SODIUM CHLORIDE: 150; 5; 450 INJECTION, SOLUTION INTRAVENOUS at 13:58

## 2018-08-10 RX ADMIN — APIXABAN 5 MG: 5 TABLET, FILM COATED ORAL at 21:33

## 2018-08-10 RX ADMIN — BRIMONIDINE TARTRATE 1 DROP: 2 SOLUTION OPHTHALMIC at 08:42

## 2018-08-10 RX ADMIN — SODIUM CHLORIDE, PRESERVATIVE FREE 10 ML: 5 INJECTION INTRAVENOUS at 21:33

## 2018-08-10 RX ADMIN — DORZOLAMIDE HYDROCHLORIDE AND TIMOLOL MALEATE 1 DROP: 20; 5 SOLUTION/ DROPS OPHTHALMIC at 08:42

## 2018-08-10 RX ADMIN — DOCUSATE SODIUM 100 MG: 50 LIQUID ORAL at 21:33

## 2018-08-10 RX ADMIN — Medication: at 20:15

## 2018-08-10 RX ADMIN — ASPIRIN 81 MG 81 MG: 81 TABLET ORAL at 08:42

## 2018-08-10 RX ADMIN — PANTOPRAZOLE SODIUM 40 MG: 40 INJECTION, POWDER, FOR SOLUTION INTRAVENOUS at 08:39

## 2018-08-10 RX ADMIN — IPRATROPIUM BROMIDE AND ALBUTEROL SULFATE 1 AMPULE: .5; 3 SOLUTION RESPIRATORY (INHALATION) at 12:34

## 2018-08-10 RX ADMIN — PIPERACILLIN AND TAZOBACTAM 3.38 G: 3; .375 INJECTION, POWDER, FOR SOLUTION INTRAVENOUS at 21:33

## 2018-08-10 ASSESSMENT — PAIN SCALES - GENERAL: PAINLEVEL_OUTOF10: 0

## 2018-08-10 NOTE — PROGRESS NOTES
Hospitalist Progress Note      PCP: Kary Vines    Date of Admission: 8/2/2018    Chief Complaint: unresponsive     Hospital Course:   admitted with septic shock, high grade fever, acute kidney injury (KDIGO stage 3), acute encephalopathy required  multiple pressors and mechanical ventilation 30% FiO2 AC/VC.   CT abd showed obstructing stone.  Had cystoscopy and right ureteral stent (Dr. Ubaldo Sales) to relieve obstruction of right kidney. Blood cultures are positive for E coli and Pseudomonas, UC with GNR. Prior history of UTI with bacteremia, E coli sensitive to cefepime.      Subjective:     Remains NPO on IV dextrose for hypoglycemia. Received a dose of IV lasix overnight for concern for vol overload. Pt sating well on 1L 02. Minimally verbal . RN reports moans in pain when being turned.         Medications:  Reviewed    Infusion Medications    dextrose 100 mL/hr (08/09/18 0849)    dextrose 5% and 0.45% NaCl with KCl 20 mEq 50 mL/hr at 08/09/18 1719     Scheduled Medications    docusate  100 mg Oral BID    pantoprazole  40 mg Intravenous Daily    And    sodium chloride flush  10 mL Intravenous Daily    mupirocin   Nasal BID    piperacillin-tazobactam  3.375 g Intravenous Q8H    aspirin  81 mg Oral Daily    ipratropium-albuterol  1 ampule Inhalation Q4H WA    apixaban  5 mg Oral BID    brimonidine  1 drop Both Eyes BID    dorzolamide-timolol  1 drop Both Eyes BID    latanoprost  1 drop Both Eyes Nightly    sodium chloride flush  10 mL Intravenous 2 times per day    lactobacillus  1 capsule Oral BID WC     PRN Meds: morphine, glucose, dextrose, glucagon (rDNA), dextrose, potassium chloride, magnesium sulfate, sodium phosphate IVPB **OR** sodium phosphate IVPB, sodium chloride flush, magnesium hydroxide, ondansetron      Intake/Output Summary (Last 24 hours) at 08/10/18 1027  Last data filed at 08/10/18 0545   Gross per 24 hour   Intake             2421 ml   Output             2150 ml   Net 271 ml       Physical Exam Performed:    /67   Pulse 100   Temp 98.6 °F (37 °C) (Axillary)   Resp 22   Ht 5' 9\" (1.753 m)   Wt 188 lb 11.4 oz (85.6 kg)   SpO2 96%   BMI 27.87 kg/m²     General appearance: lethargic, not in overt resp distress   HEENT: Pupils equal, round, Conjunctivae/corneas clear. Neck: Supple, with full range of motion. No jugular venous distention. Trachea midline. Respiratory: bibasilar rales   Cardiovascular: Regular rate and rhythm with normal S1/S2 without murmurs, rubs or gallops. Abdomen: Soft, non-tender, non-distended with normal bowel sounds. Musculoskeletal: No clubbing, cyanosis or edema bilaterally. Skin: Skin color, texture, turgor normal.  No rashes or lesions on exposed skin . Neurologic:  Awake  , following simple commands, moving all extremities spontaneously  Psychiatric: unable to assess   Capillary Refill: Brisk,< 3 seconds   Peripheral Pulses: +2 palpable, equal bilaterally       Labs:   Recent Labs      08/08/18   0550  08/09/18   0510  08/10/18   0600   WBC  17.5*  14.8*  12.6*   HGB  10.9*  11.2*  10.6*   HCT  33.4*  32.7*  31.5*   PLT  94*  124*  127*     Recent Labs      08/07/18 1838  08/08/18   0550  08/09/18   0510  08/09/18   1406   NA  141  141  145   --    K  2.6*  2.6*  3.1*  3.6   CL  104  105  110   --    CO2  29  28  26   --    BUN  11  10  10   --    CREATININE  0.9  1.0  1.0   --    CALCIUM  7.8*  7.8*  7.8*   --    PHOS  1.5*  2.3*   --    --      Recent Labs      08/07/18 1838 08/08/18   0550  08/09/18   0510   AST  177*  196*  163*   ALT  226*  213*  178*   BILIDIR   --   1.9*  1.9*   BILITOT  2.3*  2.2*  2.2*   ALKPHOS  338*  302*  309*     No results for input(s): INR in the last 72 hours. No results for input(s): Monica Betancourt in the last 72 hours.     Urinalysis:      Lab Results   Component Value Date    NITRU POSITIVE 08/02/2018    WBCUA >100 08/02/2018    BACTERIA 1+ 08/02/2018    RBCUA  08/02/2018    BLOODU MODERATE 08/02/2018    SPECGRAV 1.010 08/02/2018    GLUCOSEU Negative 08/02/2018       Radiology:  CT ABDOMEN PELVIS WO CONTRAST Additional Contrast? None   Final Result   New inflammatory change surrounding the gallbladder, with gallbladder   distention, suggesting developing cholecystitis. Interval insertion of right-sided ureteral stent. There is decreased   right-sided hydronephrosis. .  Punctate stones remain in right kidney. Small stone marginates the ureteral stent in the proximal right ureter. Large bladder calculus appears unchanged. Scattered areas of colonic wall thickening are seen either due to incomplete   distention or superimposed colitis. Appendix is not well seen      Increased pleural effusions with increased lung consolidation      No change in cystic nodule in the region the pancreas         XR ABDOMEN (KUB) (SINGLE AP VIEW)   Final Result   Nasoenteric tube tip overlies the greater curvature of the stomach. XR CHEST PORTABLE   Final Result   Right IJ catheter has been pulled back with tip above the clavicle. Recommend repositioning. Some improvement in aeration of the lungs. XR CHEST PORTABLE   Final Result   Pleural effusions with increasing atelectasis and/or pneumonia. XR CHEST PORTABLE   Final Result   1. Bibasilar atelectasis and/or pneumonia. 2. The enteric tube is in good position in the stomach. XR ABDOMEN (KUB) (SINGLE AP VIEW)   Final Result   Successful placement of right ureteral stent. CT ABDOMEN PELVIS WO CONTRAST Additional Contrast? None   Final Result   1. 4 mm obstructing nephrolithiasis within the right proximal ureter causing   moderate hydronephrosis. 2. Bilateral lower lobe pneumonia. Recommend chest radiograph in 8 weeks to   confirm resolution. 3. Mildly dilated appendix without periappendiceal inflammation can be seen   in the setting of early acute appendicitis.    4. Unchanged 2.9 cm pancreatic and right ureteral stent (Dr. Yamel Greco) to relieve obstruction of right kidney. Metabolic acidosis secondary to lactate and sepsis resolving.  Did not need  CRRT per nephrology. UOP improved  on IVF. Nephro has signed off       Open angle glaucoma continues on brimonidine, dorzolamide-timolol, and latanoprost eye drops to avoid rebound increase in intraocular pressure. Stress ulcer prophylaxis with pantoprazole (Protonix). Iron deficiency anemia continues on ferrous sulfate and ascorbic acid. Hypokalemia and hypomagnesemia:   monitoring and replacing as needed     Elevated troponin likely secondary to sepsis and demand ischemia. Cardio recs noted and appreciated. Not candidate for cath lab due to depressed mental status, thrombocytopenia, and sepsis       Elevated LFTs : likely due to profound sepsis, hypotension last week  or possibly cholecystitis. Gen surgery assisting. Remains NPO and IV abx. LFT improved       Hypoglycemia: pt NPO. On  dose IV dextrose. Will resume TF today. Hypoglycemia protocol. DVT Prophylaxis:  On eliquis      Diet: Diet NPO Effective Now  Code Status: DNR-CCA palliative care assisting       Dispo -  Overall poor prognosis.  Palliative care to further  discuss with family today to consider possible hospice      Kaylee Ingram MD

## 2018-08-10 NOTE — PROGRESS NOTES
Paged Ernesto Benton with the following request:     Pt lungs sound fluid filled w/increasing edema, pt appears uncomfortable, intake 2k output 800 in last 24 hours. Can he get a dose of IV Lasix possibly? Currently waiting on response.

## 2018-08-10 NOTE — FLOWSHEET NOTE
08/09/18 2140   Assessment   Charting Type Shift assessment   Neurological   Neuro (WDL) X   Level of Consciousness 0   Orientation Level MARCELLO   Cognition MARCELLO   Language Nods/gestures appropriately   Size R Pupil (mm) 3   R Pupil Shape Round   R Pupil Reaction Brisk   Size L Pupil (mm) 3   L Pupil Shape Round   L Pupil Reaction Brisk   R Hand  Moderate   L Hand  Moderate   R Foot Dorsiflexion Weak   L Foot Dorsiflexion Weak   R Foot Plantar Flexion Weak   L Foot Plantar Flexion Weak   RUE Motor Response Responds to command   Sensation RUE MARCELLO   LUE Motor Response Responds to command   Sensation LUE MARCELLO   RLE Motor Response Responds to command   Sensation RLE MARCELLO   LLE Motor Response Responds to command   Sensation LLE MARCELLO   Gag MARCELLO   Edward Coma Scale   Eye Opening 4   Best Verbal Response 2   Best Motor Response 6   Edward Coma Scale Score 12   HEENT   HEENT (WDL) X   Right Eye Impaired vision   Left Eye Impaired vision   Voice Hoarse;Raspy; Difficulty talking   Mucous Membrane Dry   Teeth Missing teeth   Respiratory   Respiratory (WDL) X   Respiratory Pattern Regular   Respiratory Depth Normal   Respiratory Quality/Effort Labored   Chest Assessment Chest expansion symmetrical;Trachea midline   L Breath Sounds Rhonchi   R Breath Sounds Rhonchi   Breath Sounds   Right Upper Lobe Rhonchi   Right Middle Lobe Rhonchi   Right Lower Lobe Rhonchi   Left Upper Lobe Rhonchi   Left Lower Lobe Rhonchi   Cough/Sputum   Sputum How Obtained Spontaneous cough   Cough Productive   Frequency Occasional   Sputum Amount Moderate   Sputum Color Cloudy   Tenacity Thick   Cardiac   Cardiac (WDL) X   Cardiac Regularity Regular   Heart Sounds S1, S2   Cardiac Rhythm NSR;ST   Cardiac Monitor   Telemetry Monitor On Yes   Telemetry Audible Yes   Telemetry Alarms Set Yes   Telemetry Box Number [id number]   Gastrointestinal   Abdominal (WDL) X   Abdomen Inspection Rounded; Soft   Bowel Sounds (All Quadrants) Hypoactive   Tenderness Nontender Bowel Sounds   RUQ Bowel Sounds Hypoactive   LUQ Bowel Sounds Hypoactive   RLQ Bowel Sounds Hypoactive   LLQ Bowel Sounds Hypoactive   Peripheral Vascular   Peripheral Vascular (WDL) X   Edema Right lower extremity; Left lower extremity   RLE Edema +2;Non-pitting   LLE Edema +2;Non-pitting   Sacral Edema +2   Skin Color/Condition   Skin Color/Condition (WDL) WDL   Skin Integrity   Skin Integrity (WDL) WDL   Musculoskeletal   Musculoskeletal (WDL) X   RUE Contracture   LUE Contracture   RL Extremity Weakness   LL Extremity Weakness   Genitourinary   Genitourinary (WDL) X  (suprapubic catheter)   Flank Tenderness MARCELLO   Suprapubic Tenderness MARCELLO   Dysuria MARCELLO   Urine Assessment   Urine Color Red   Urine Odor No odor   Anus/Rectum   Anus/Rectum (WDL) WDL   Psychosocial   Psychosocial (WDL) X   Patient Behaviors Not interactive   Provider Notification   Reason for Communication Evaluate  (Daily ABG orderer to be d/c per RT Luke Longo )   Provider Name FIF    Provider Notification Nurse Practitioner   Method of Communication Secure Message

## 2018-08-10 NOTE — PROGRESS NOTES
Pt alert, non verbal.  Can communicate yes or no questions. VSS. Assessment updated and charted. Moans and Groans with movement. Gown changed. Denies any other needs. Call light within reach. Will continue to monitor.

## 2018-08-11 ENCOUNTER — APPOINTMENT (OUTPATIENT)
Dept: GENERAL RADIOLOGY | Age: 83
DRG: 853 | End: 2018-08-11
Payer: COMMERCIAL

## 2018-08-11 LAB
ANION GAP SERPL CALCULATED.3IONS-SCNC: 8 MMOL/L (ref 3–16)
BUN BLDV-MCNC: 7 MG/DL (ref 7–20)
CALCIUM SERPL-MCNC: 7.3 MG/DL (ref 8.3–10.6)
CHLORIDE BLD-SCNC: 108 MMOL/L (ref 99–110)
CO2: 23 MMOL/L (ref 21–32)
CREAT SERPL-MCNC: 1 MG/DL (ref 0.8–1.3)
GFR AFRICAN AMERICAN: >60
GFR NON-AFRICAN AMERICAN: >60
GLUCOSE BLD-MCNC: 101 MG/DL (ref 70–99)
GLUCOSE BLD-MCNC: 103 MG/DL (ref 70–99)
GLUCOSE BLD-MCNC: 109 MG/DL (ref 70–99)
GLUCOSE BLD-MCNC: 73 MG/DL (ref 70–99)
GLUCOSE BLD-MCNC: 84 MG/DL (ref 70–99)
GLUCOSE BLD-MCNC: 84 MG/DL (ref 70–99)
GLUCOSE BLD-MCNC: 94 MG/DL (ref 70–99)
HCT VFR BLD CALC: 29.4 % (ref 40.5–52.5)
HEMOGLOBIN: 9.9 G/DL (ref 13.5–17.5)
MCH RBC QN AUTO: 30.1 PG (ref 26–34)
MCHC RBC AUTO-ENTMCNC: 33.8 G/DL (ref 31–36)
MCV RBC AUTO: 89.3 FL (ref 80–100)
PDW BLD-RTO: 14.6 % (ref 12.4–15.4)
PERFORMED ON: ABNORMAL
PERFORMED ON: NORMAL
PLATELET # BLD: 134 K/UL (ref 135–450)
PMV BLD AUTO: 10.3 FL (ref 5–10.5)
POTASSIUM SERPL-SCNC: 3.6 MMOL/L (ref 3.5–5.1)
RBC # BLD: 3.3 M/UL (ref 4.2–5.9)
SODIUM BLD-SCNC: 139 MMOL/L (ref 136–145)
WBC # BLD: 11.8 K/UL (ref 4–11)

## 2018-08-11 PROCEDURE — 85027 COMPLETE CBC AUTOMATED: CPT

## 2018-08-11 PROCEDURE — 6370000000 HC RX 637 (ALT 250 FOR IP): Performed by: INTERNAL MEDICINE

## 2018-08-11 PROCEDURE — C9113 INJ PANTOPRAZOLE SODIUM, VIA: HCPCS | Performed by: INTERNAL MEDICINE

## 2018-08-11 PROCEDURE — 94761 N-INVAS EAR/PLS OXIMETRY MLT: CPT

## 2018-08-11 PROCEDURE — 71045 X-RAY EXAM CHEST 1 VIEW: CPT

## 2018-08-11 PROCEDURE — 2500000003 HC RX 250 WO HCPCS: Performed by: NURSE PRACTITIONER

## 2018-08-11 PROCEDURE — 6360000002 HC RX W HCPCS: Performed by: INTERNAL MEDICINE

## 2018-08-11 PROCEDURE — 74018 RADEX ABDOMEN 1 VIEW: CPT

## 2018-08-11 PROCEDURE — 94668 MNPJ CHEST WALL SBSQ: CPT

## 2018-08-11 PROCEDURE — 99233 SBSQ HOSP IP/OBS HIGH 50: CPT | Performed by: INTERNAL MEDICINE

## 2018-08-11 PROCEDURE — 2060000000 HC ICU INTERMEDIATE R&B

## 2018-08-11 PROCEDURE — 2580000003 HC RX 258: Performed by: INTERNAL MEDICINE

## 2018-08-11 PROCEDURE — 80048 BASIC METABOLIC PNL TOTAL CA: CPT

## 2018-08-11 PROCEDURE — 2580000003 HC RX 258: Performed by: FAMILY MEDICINE

## 2018-08-11 PROCEDURE — 2700000000 HC OXYGEN THERAPY PER DAY

## 2018-08-11 PROCEDURE — 94640 AIRWAY INHALATION TREATMENT: CPT

## 2018-08-11 PROCEDURE — 94669 MECHANICAL CHEST WALL OSCILL: CPT

## 2018-08-11 PROCEDURE — 31720 CLEARANCE OF AIRWAYS: CPT

## 2018-08-11 RX ORDER — ACETYLCYSTEINE 200 MG/ML
600 SOLUTION ORAL; RESPIRATORY (INHALATION) 2 TIMES DAILY
Status: DISCONTINUED | OUTPATIENT
Start: 2018-08-11 | End: 2018-08-12

## 2018-08-11 RX ADMIN — BRIMONIDINE TARTRATE 1 DROP: 2 SOLUTION OPHTHALMIC at 21:44

## 2018-08-11 RX ADMIN — POTASSIUM CHLORIDE, DEXTROSE MONOHYDRATE AND SODIUM CHLORIDE: 150; 5; 450 INJECTION, SOLUTION INTRAVENOUS at 20:51

## 2018-08-11 RX ADMIN — ACETYLCYSTEINE 600 MG: 200 SOLUTION ORAL; RESPIRATORY (INHALATION) at 20:36

## 2018-08-11 RX ADMIN — PIPERACILLIN AND TAZOBACTAM 3.38 G: 3; .375 INJECTION, POWDER, FOR SOLUTION INTRAVENOUS at 06:21

## 2018-08-11 RX ADMIN — IPRATROPIUM BROMIDE AND ALBUTEROL SULFATE 1 AMPULE: .5; 3 SOLUTION RESPIRATORY (INHALATION) at 00:21

## 2018-08-11 RX ADMIN — IPRATROPIUM BROMIDE AND ALBUTEROL SULFATE 1 AMPULE: .5; 3 SOLUTION RESPIRATORY (INHALATION) at 20:36

## 2018-08-11 RX ADMIN — SODIUM CHLORIDE, PRESERVATIVE FREE 10 ML: 5 INJECTION INTRAVENOUS at 10:00

## 2018-08-11 RX ADMIN — DORZOLAMIDE HYDROCHLORIDE AND TIMOLOL MALEATE 1 DROP: 20; 5 SOLUTION/ DROPS OPHTHALMIC at 09:58

## 2018-08-11 RX ADMIN — IPRATROPIUM BROMIDE AND ALBUTEROL SULFATE 1 AMPULE: .5; 3 SOLUTION RESPIRATORY (INHALATION) at 16:37

## 2018-08-11 RX ADMIN — IPRATROPIUM BROMIDE AND ALBUTEROL SULFATE 1 AMPULE: .5; 3 SOLUTION RESPIRATORY (INHALATION) at 08:15

## 2018-08-11 RX ADMIN — DORZOLAMIDE HYDROCHLORIDE AND TIMOLOL MALEATE 1 DROP: 20; 5 SOLUTION/ DROPS OPHTHALMIC at 21:44

## 2018-08-11 RX ADMIN — LATANOPROST 1 DROP: 50 SOLUTION OPHTHALMIC at 21:44

## 2018-08-11 RX ADMIN — PIPERACILLIN AND TAZOBACTAM 3.38 G: 3; .375 INJECTION, POWDER, FOR SOLUTION INTRAVENOUS at 15:01

## 2018-08-11 RX ADMIN — POTASSIUM CHLORIDE, DEXTROSE MONOHYDRATE AND SODIUM CHLORIDE: 150; 5; 450 INJECTION, SOLUTION INTRAVENOUS at 04:32

## 2018-08-11 RX ADMIN — BRIMONIDINE TARTRATE 1 DROP: 2 SOLUTION OPHTHALMIC at 09:58

## 2018-08-11 RX ADMIN — PANTOPRAZOLE SODIUM 40 MG: 40 INJECTION, POWDER, FOR SOLUTION INTRAVENOUS at 09:58

## 2018-08-11 RX ADMIN — IPRATROPIUM BROMIDE AND ALBUTEROL SULFATE 1 AMPULE: .5; 3 SOLUTION RESPIRATORY (INHALATION) at 11:45

## 2018-08-11 RX ADMIN — SODIUM CHLORIDE, PRESERVATIVE FREE 10 ML: 5 INJECTION INTRAVENOUS at 09:58

## 2018-08-11 ASSESSMENT — PAIN SCALES - GENERAL
PAINLEVEL_OUTOF10: 0
PAINLEVEL_OUTOF10: 0

## 2018-08-11 NOTE — PROGRESS NOTES
Lisbeth Armstrong NP notified that [id number] [place]- pt's K+ 3.1 , and BS 96 would you like to continue with current IVF of D5.45 20KCL at 50mL/hr ?

## 2018-08-11 NOTE — PROGRESS NOTES
Xi Houston NP notified that pt has TF going and his last residual was 70mL at 2000. Now his residual is 150mL his TF is ordered to increase by 25mL q4hr. he is currently at 45mL/hr and is scheduled to be increased to goal of 65 now. pt is also having light tan sputum that is being suctioned often. NG is still secured at small place as beginning of shift. Please advise as to increasing rate of checking placement?

## 2018-08-11 NOTE — PROGRESS NOTES
08/11/18 0817   Treatment   Treatment Type HHN   Medications Albuterol/Ipratropium   Duration 10   Pre-Tx Pulse 94   Pre-Tx Resps 20   Breath Sounds Pre-Tx Left Diminished   Breath Sounds Pre-Tx RAJANI Diminished   Breath Sounds Pre-Tx LLL Diminished   Breath Sounds Pre-Tx Right Diminished   Breath Sounds Pre-Tx RUL Diminished   Breath Sounds Pre-Tx RML Diminished   Breath Sounds Pre-Tx RLL Diminished   Mid-Tx Pulse 94   Breath Sounds Post-Tx Left Diminished   Breath Sounds Post-Tx RAJANI Diminished   Breath Sounds Post-Tx LLL Diminished   Breath Sounds Post-Tx Right Diminished   Breath Sounds Post-Tx RUL Diminished   Breath Sounds Post-Tx RML Diminished   Breath Sounds Post-Tx RLL Diminished   Post-Tx Pulse 92   Post-Tx Resps 20   Delivery Source Oxygen   Position Semi-Madeline's   Tx Tolerance Well

## 2018-08-11 NOTE — PROGRESS NOTES
Pulmonary & Critical Care Inpatient Progress Note   Adriana Dominguez MD     REASON FOR TODAY'S VISIT:  Acute resp failure    SUBJECTIVE:   Asked to re-evaluate due to persistent respiratory secretion issues. Remains on nasal cannula oxygen  Minimally participatory with examination  Family are discussing with palliative care and contemplating a withdrawal of support    Scheduled Meds:   acetylcysteine  600 mg Inhalation BID    docusate  100 mg Oral BID    pantoprazole  40 mg Intravenous Daily    And    sodium chloride flush  10 mL Intravenous Daily    piperacillin-tazobactam  3.375 g Intravenous Q8H    aspirin  81 mg Oral Daily    ipratropium-albuterol  1 ampule Inhalation Q4H WA    apixaban  5 mg Oral BID    brimonidine  1 drop Both Eyes BID    dorzolamide-timolol  1 drop Both Eyes BID    latanoprost  1 drop Both Eyes Nightly    sodium chloride flush  10 mL Intravenous 2 times per day    lactobacillus  1 capsule Oral BID WC       Continuous Infusions:   dextrose 100 mL/hr (08/09/18 0849)    dextrose 5% and 0.45% NaCl with KCl 20 mEq 75 mL/hr at 08/11/18 0432       PRN Meds:  morphine, glucose, dextrose, glucagon (rDNA), dextrose, potassium chloride, magnesium sulfate, sodium phosphate IVPB **OR** sodium phosphate IVPB, sodium chloride flush, magnesium hydroxide, ondansetron    ALLERGIES:  Patient has No Known Allergies. Objective:   PHYSICAL EXAM:  /82   Pulse 89   Temp 98.1 °F (36.7 °C) (Oral)   Resp 16   Ht 5' 9\" (1.753 m)   Wt 184 lb 8.4 oz (83.7 kg)   SpO2 96%   BMI 27.25 kg/m²    Physical Exam   Constitutional: He appears well-developed and well-nourished. No distress. HENT:   Head: Normocephalic and atraumatic. Mouth/Throat: Oropharynx is clear and moist. No oropharyngeal exudate. Eyes: EOM are normal. Pupils are equal, round, and reactive to light. Neck: Neck supple. No JVD present. Cardiovascular: Normal heart sounds. Exam reveals no gallop and no friction rub.     No

## 2018-08-11 NOTE — PROGRESS NOTES
Patient was NTS'd due to being uncomfortable with his breathing. RN was made aware of light brown secretions due to patient is on tube feed.

## 2018-08-11 NOTE — PROGRESS NOTES
Physical Exam Performed:    /72   Pulse 89   Temp 98.7 °F (37.1 °C) (Oral)   Resp 18   Ht 5' 9\" (1.753 m)   Wt 184 lb 8.4 oz (83.7 kg)   SpO2 95%   BMI 27.25 kg/m²     General appearance: lethargic, not in overt resp distress   HEENT: Pupils equal, round, Conjunctivae/corneas clear. Neck: Supple, with full range of motion. No jugular venous distention. Trachea midline. Respiratory: bibasilar rales   Cardiovascular: Regular rate and rhythm with normal S1/S2 without murmurs, rubs or gallops. Abdomen: Soft, non-tender, non-distended with normal bowel sounds. Musculoskeletal: No clubbing, cyanosis or edema bilaterally. Skin: Skin color, texture, turgor normal.  No rashes or lesions on exposed skin . Neurologic:  Awake  , following simple commands, moving all extremities spontaneously  Psychiatric: unable to assess   Capillary Refill: Brisk,< 3 seconds   Peripheral Pulses: +2 palpable, equal bilaterally       Labs:   Recent Labs      08/09/18   0510  08/10/18   0600  08/11/18   0525   WBC  14.8*  12.6*  11.8*   HGB  11.2*  10.6*  9.9*   HCT  32.7*  31.5*  29.4*   PLT  124*  127*  134*     Recent Labs      08/09/18   0510  08/09/18   1406  08/10/18   2014   NA  145   --   140   K  3.1*  3.6  3.1*   CL  110   --   106   CO2  26   --   26   BUN  10   --   9   CREATININE  1.0   --   1.0   CALCIUM  7.8*   --   7.6*     Recent Labs      08/09/18   0510   AST  163*   ALT  178*   BILIDIR  1.9*   BILITOT  2.2*   ALKPHOS  309*     No results for input(s): INR in the last 72 hours. No results for input(s): Jessica Watts in the last 72 hours.     Urinalysis:      Lab Results   Component Value Date    NITRU POSITIVE 08/02/2018    WBCUA >100 08/02/2018    BACTERIA 1+ 08/02/2018    RBCUA  08/02/2018    BLOODU MODERATE 08/02/2018    SPECGRAV 1.010 08/02/2018    GLUCOSEU Negative 08/02/2018       Radiology:  CT ABDOMEN PELVIS WO CONTRAST Additional Contrast? None   Final Result   New inflammatory change surrounding the gallbladder, with gallbladder   distention, suggesting developing cholecystitis. Interval insertion of right-sided ureteral stent. There is decreased   right-sided hydronephrosis. .  Punctate stones remain in right kidney. Small stone marginates the ureteral stent in the proximal right ureter. Large bladder calculus appears unchanged. Scattered areas of colonic wall thickening are seen either due to incomplete   distention or superimposed colitis. Appendix is not well seen      Increased pleural effusions with increased lung consolidation      No change in cystic nodule in the region the pancreas         XR ABDOMEN (KUB) (SINGLE AP VIEW)   Final Result   Nasoenteric tube tip overlies the greater curvature of the stomach. XR CHEST PORTABLE   Final Result   Right IJ catheter has been pulled back with tip above the clavicle. Recommend repositioning. Some improvement in aeration of the lungs. XR CHEST PORTABLE   Final Result   Pleural effusions with increasing atelectasis and/or pneumonia. XR CHEST PORTABLE   Final Result   1. Bibasilar atelectasis and/or pneumonia. 2. The enteric tube is in good position in the stomach. XR ABDOMEN (KUB) (SINGLE AP VIEW)   Final Result   Successful placement of right ureteral stent. CT ABDOMEN PELVIS WO CONTRAST Additional Contrast? None   Final Result   1. 4 mm obstructing nephrolithiasis within the right proximal ureter causing   moderate hydronephrosis. 2. Bilateral lower lobe pneumonia. Recommend chest radiograph in 8 weeks to   confirm resolution. 3. Mildly dilated appendix without periappendiceal inflammation can be seen   in the setting of early acute appendicitis. 4. Unchanged 2.9 cm pancreatic cystic lesion. 5. Fecal impaction.          XR CHEST PORTABLE   Final Result   Curvilinear opacity within the medial right lung base, which could represent   atelectasis, pneumonia, or aspiration. XR CHEST PORTABLE   Final Result   1. Right internal jugular catheter terminating in the mid SVC. CT HEAD WO CONTRAST   Final Result   No acute intracranial abnormality. FLUORO FOR SURGICAL PROCEDURES    (Results Pending)   XR ABDOMEN (KUB) (SINGLE AP VIEW)    (Results Pending)           Assessment/Plan:    Active Hospital Problems    Diagnosis Date Noted    Hypokalemia [E87.6] 08/08/2018    Bacteremia due to Pseudomonas [R78.81] 08/03/2018    Pulmonary infiltrates [R91.8] 08/03/2018    Nephrolithiasis [N20.0] 08/03/2018    Hydronephrosis with urinary obstruction due to renal calculus [N13.2] 08/03/2018    Fecal impaction ([address] [K56.41] 08/03/2018    Pancreatic cyst [K86.2] 08/03/2018    Acute respiratory insufficiency, postoperative [J95.89] 08/03/2018    Acute renal failure ([address] [N17.9]     Septic shock ([address] [A41.9, R65.21] 08/02/2018    Pyuria [N39.0] 08/02/2018    GONSALO (acute kidney injury) ([address] [N17.9] 08/02/2018    Lactic acidosis [E87.2] 08/02/2018    Elevated troponin [R74.8] 08/02/2018    Open-angle glaucoma [H40.10X0] 08/02/2018    Altered mental status [R41.82]     E coli bacteremia [R78.81] 06/17/2015    Sepsis ([address] [A41.9] 06/13/2015     Septic shock : required multiple pressors which has been weaned off . Likely E coli and P aeruginosa bacteremia. On  abx per ID. CT abd/pelvis on 8/7 showed possible acute cholecystitis. Gen surgery assisting . Not a surgical candidate given high mortality risk. Palliative care to discuss with family for goals of care- ? Hospice       Respiratory failure requiring mechanical ventilation . S/p  Bronchoscopy on 8/6. Follow bronch cultures. Extubated on 8/7 which he has been  tolerating. Having large amount of secretions suctioned . Discussed with pulm - will assess pt today.  CXR ordered        Hydronephrosis with urinary obstruction:  s/p  cystoscopy and right ureteral stent (Dr. Ruthie Renteria) to relieve obstruction of

## 2018-08-11 NOTE — PROGRESS NOTES
RESPIRATORY THERAPY ASSESSMENT    Name:  Danielito Blair 89 Record Number:  [id number]  Age: 80 y.o. Gender: male  : 1928  Today's Date:  2018  Room:  [address] [place] [address]    Assessment     Is the patient being admitted for a COPD or Asthma exacerbation? No   (If yes the patient will be seen every 4 hours for the first 24 hours and then reassessed)    Patient Admission Diagnosis      Allergies  No Known Allergies    Minimum Predicted Vital Capacity:     974          Actual Vital Capacity:      Unable to do          Pulmonary History:No history  Home Oxygen Therapy:  room air  Home Respiratory Therapy:None   Current Respiratory Therapy:  Duoneb Q4WA  Treatment Type: HHN  Medications: Albuterol/Ipratropium    Respiratory Severity Index(RSI)   Patients with orders for inhalation medications, oxygen, or any therapeutic treatment modality will be placed on Respiratory Protocol. They will be assessed with the first treatment and at least every 72 hours thereafter. The following severity scale will be used to determine frequency of treatment intervention.     Smoking History: No Smoking History = 0    Social History  Social History   Substance Use Topics    Smoking status: Never Smoker    Smokeless tobacco: Never Used    Alcohol use No       Recent Surgical History: Lower Abdominal = 2  Past Surgical History  Past Surgical History:   Procedure Laterality Date    BRONCHOSCOPY N/A 2018    BRONCHOSCOPY performed by Doc Platt MD at [facility] N/A 2018    BRONCHOSCOPY ALVEOLAR LAVAGE [facility]2018    CYSTOSCOPY URETERAL STENT INSERTION performed by Alice Coker MD at [facility] 1       Level of Consciousness: Lethargic = 3    Level of Activity: Bedridden, unresponsive or quadriplegic = 4    Respiratory Pattern: Regular Pattern; RR 8-20 = 0    Breath Sounds: possible discontinuation.

## 2018-08-12 LAB
GLUCOSE BLD-MCNC: 73 MG/DL (ref 70–99)
GLUCOSE BLD-MCNC: 74 MG/DL (ref 70–99)
GLUCOSE BLD-MCNC: 75 MG/DL (ref 70–99)
GLUCOSE BLD-MCNC: 76 MG/DL (ref 70–99)
GLUCOSE BLD-MCNC: 91 MG/DL (ref 70–99)
HCT VFR BLD CALC: 28.6 % (ref 40.5–52.5)
HEMOGLOBIN: 9.8 G/DL (ref 13.5–17.5)
MCH RBC QN AUTO: 30 PG (ref 26–34)
MCHC RBC AUTO-ENTMCNC: 34.2 G/DL (ref 31–36)
MCV RBC AUTO: 87.7 FL (ref 80–100)
PDW BLD-RTO: 14.7 % (ref 12.4–15.4)
PERFORMED ON: NORMAL
PLATELET # BLD: 181 K/UL (ref 135–450)
PMV BLD AUTO: 10.1 FL (ref 5–10.5)
RBC # BLD: 3.25 M/UL (ref 4.2–5.9)
WBC # BLD: 11.1 K/UL (ref 4–11)

## 2018-08-12 PROCEDURE — 2580000003 HC RX 258: Performed by: INTERNAL MEDICINE

## 2018-08-12 PROCEDURE — 6370000000 HC RX 637 (ALT 250 FOR IP): Performed by: INTERNAL MEDICINE

## 2018-08-12 PROCEDURE — 2700000000 HC OXYGEN THERAPY PER DAY

## 2018-08-12 PROCEDURE — 2580000003 HC RX 258: Performed by: FAMILY MEDICINE

## 2018-08-12 PROCEDURE — 2060000000 HC ICU INTERMEDIATE R&B

## 2018-08-12 PROCEDURE — 94761 N-INVAS EAR/PLS OXIMETRY MLT: CPT

## 2018-08-12 PROCEDURE — 6360000002 HC RX W HCPCS: Performed by: INTERNAL MEDICINE

## 2018-08-12 PROCEDURE — 85027 COMPLETE CBC AUTOMATED: CPT

## 2018-08-12 PROCEDURE — C9113 INJ PANTOPRAZOLE SODIUM, VIA: HCPCS | Performed by: INTERNAL MEDICINE

## 2018-08-12 PROCEDURE — 99233 SBSQ HOSP IP/OBS HIGH 50: CPT | Performed by: INTERNAL MEDICINE

## 2018-08-12 RX ADMIN — PIPERACILLIN AND TAZOBACTAM 3.38 G: 3; .375 INJECTION, POWDER, FOR SOLUTION INTRAVENOUS at 00:31

## 2018-08-12 RX ADMIN — LATANOPROST 1 DROP: 50 SOLUTION OPHTHALMIC at 19:56

## 2018-08-12 RX ADMIN — DORZOLAMIDE HYDROCHLORIDE AND TIMOLOL MALEATE 1 DROP: 20; 5 SOLUTION/ DROPS OPHTHALMIC at 09:49

## 2018-08-12 RX ADMIN — SODIUM CHLORIDE, PRESERVATIVE FREE 10 ML: 5 INJECTION INTRAVENOUS at 09:56

## 2018-08-12 RX ADMIN — PIPERACILLIN AND TAZOBACTAM 3.38 G: 3; .375 INJECTION, POWDER, FOR SOLUTION INTRAVENOUS at 16:15

## 2018-08-12 RX ADMIN — SODIUM CHLORIDE, PRESERVATIVE FREE 10 ML: 5 INJECTION INTRAVENOUS at 19:56

## 2018-08-12 RX ADMIN — DORZOLAMIDE HYDROCHLORIDE AND TIMOLOL MALEATE 1 DROP: 20; 5 SOLUTION/ DROPS OPHTHALMIC at 19:56

## 2018-08-12 RX ADMIN — BRIMONIDINE TARTRATE 1 DROP: 2 SOLUTION OPHTHALMIC at 09:51

## 2018-08-12 RX ADMIN — SODIUM CHLORIDE, PRESERVATIVE FREE 10 ML: 5 INJECTION INTRAVENOUS at 09:50

## 2018-08-12 RX ADMIN — BRIMONIDINE TARTRATE 1 DROP: 2 SOLUTION OPHTHALMIC at 19:55

## 2018-08-12 RX ADMIN — PIPERACILLIN AND TAZOBACTAM 3.38 G: 3; .375 INJECTION, POWDER, FOR SOLUTION INTRAVENOUS at 06:24

## 2018-08-12 RX ADMIN — PANTOPRAZOLE SODIUM 40 MG: 40 INJECTION, POWDER, FOR SOLUTION INTRAVENOUS at 09:50

## 2018-08-12 RX ADMIN — MORPHINE SULFATE 2 MG: 2 INJECTION, SOLUTION INTRAMUSCULAR; INTRAVENOUS at 16:04

## 2018-08-12 ASSESSMENT — PAIN SCALES - GENERAL: PAINLEVEL_OUTOF10: 5

## 2018-08-12 NOTE — PROGRESS NOTES
Pulmonary & Critical Care Inpatient Progress Note   Graciela Mccormack MD     REASON FOR TODAY'S VISIT:  Acute resp failure    SUBJECTIVE:   Talking to himself when seen  On 1 LPM of oxygen  Refused all respiratory treatments yesterday    Scheduled Meds:   docusate  100 mg Oral BID    pantoprazole  40 mg Intravenous Daily    And    sodium chloride flush  10 mL Intravenous Daily    piperacillin-tazobactam  3.375 g Intravenous Q8H    aspirin  81 mg Oral Daily    apixaban  5 mg Oral BID    brimonidine  1 drop Both Eyes BID    dorzolamide-timolol  1 drop Both Eyes BID    latanoprost  1 drop Both Eyes Nightly    sodium chloride flush  10 mL Intravenous 2 times per day    lactobacillus  1 capsule Oral BID WC       Continuous Infusions:   dextrose 100 mL/hr (08/09/18 0849)    dextrose 5% and 0.45% NaCl with KCl 20 mEq 50 mL/hr at 08/11/18 2051       PRN Meds:  morphine, glucose, dextrose, glucagon (rDNA), dextrose, potassium chloride, magnesium sulfate, sodium phosphate IVPB **OR** sodium phosphate IVPB, sodium chloride flush, magnesium hydroxide, ondansetron    ALLERGIES:  Patient has No Known Allergies. Objective:   PHYSICAL EXAM:  /76   Pulse 89   Temp 99.1 °F (37.3 °C) (Axillary)   Resp 18   Ht 5' 9\" (1.753 m)   Wt 184 lb (83.5 kg)   SpO2 100%   BMI 27.17 kg/m²    Physical Exam   Constitutional: He appears well-developed and well-nourished. No distress. HENT:   Head: Normocephalic and atraumatic. Mouth/Throat: Oropharynx is clear and moist. No oropharyngeal exudate. Eyes: Pupils are equal, round, and reactive to light. EOM are normal.   Neck: Neck supple. No JVD present. Cardiovascular: Normal heart sounds. Exam reveals no gallop and no friction rub. No murmur heard. Pulmonary/Chest: Effort normal. He has no wheezes. He has no rales. Equal chest rise and expansion bilaterally   Abdominal: Soft. Bowel sounds are normal. He exhibits no distension. There is no tenderness.

## 2018-08-12 NOTE — PLAN OF CARE
Problem: Nutrition  Goal: Optimal nutrition therapy  Outcome: Ongoing  Nutrition Problem: Inadequate oral intake  Intervention: Food and/or Nutrient Delivery: Modify current Tube Feeding  Nutritional Goals: Patient will tolerate enteral nutrition without GI distress or aspiration.
Problem: Nutrition  Intervention: Swallowing evaluation  SLP completed evaluation. Please refer to notes in EMR. Intervention: Aspiration precautions  SLP completed evaluation. Please refer to notes in EMR.
Problem: OXYGENATION/RESPIRATORY FUNCTION  Goal: Patient will maintain patent airway    Intervention: POSITION PATIENT FOR MAXIMUM VENTILATORY EFFICIENCY  Pt is lethargic and not able to respond to education.
Problem: Skin Integrity:  Goal: Will show no infection signs and symptoms  Will show no infection signs and symptoms   Outcome: Ongoing      Problem: Nutrition  Goal: Optimal nutrition therapy  Outcome: Ongoing      Problem: Falls - Risk of:  Goal: Will remain free from falls  Will remain free from falls   Outcome: Ongoing    Goal: Absence of physical injury  Absence of physical injury   Outcome: Ongoing      Problem: Risk for Impaired Skin Integrity  Goal: Tissue integrity - skin and mucous membranes  Structural intactness and normal physiological function of skin and  mucous membranes.    Outcome: Ongoing      Problem: Urinary Elimination:  Goal: Ability to achieve a balanced intake and output will improve  Ability to achieve a balanced intake and output will improve   Outcome: Ongoing      Problem: Restraint Use - Nonviolent/Non-Self-Destructive Behavior:  Goal: Absence of restraint indications  Absence of restraint indications   Outcome: Ongoing    Goal: Absence of restraint-related injury  Absence of restraint-related injury   Outcome: Ongoing
Problem: Urinary Elimination:  Goal: Will remain free from infection  Will remain free from infection  Outcome: Ongoing  Currently on ATB for UTI. Has suprapubic rao. Cysto done 8/3/18 & stent placed. Monitor urinary output & chart output and color. IVF as ordered. Monitor vitals per protocol. Monitor labs & notify MD with abnormal results.  Randee Puri
a day/five pounds in a week. Also notified patient to call the doctor with dizziness, increased fatigue, decreased or difficulty urinating. Pt with no evidence of learning. No additional questions at this time.     Education Time: 10 Minutes
patient to call the doctor with dizziness, increased fatigue, decreased or difficulty urinating. Pt education needs reinforcement. No additional questions at this time.     Education Time: 30 Minutes

## 2018-08-12 NOTE — PROGRESS NOTES
Hospitalist Progress Note      PCP: Molina Snider    Date of Admission: 8/2/2018    Chief Complaint: unresponsive     Hospital Course:   admitted with septic shock, high grade fever, acute kidney injury (KDIGO stage 3), acute encephalopathy required  multiple pressors and mechanical ventilation 30% FiO2 AC/VC.   CT abd showed obstructing stone.  Had cystoscopy and right ureteral stent (Dr. Alexis Maddox) to relieve obstruction of right kidney. Blood cultures are positive for E coli and Pseudomonas - on IV abx per ID. Required multiple pressors which were eventually weaned off , extubated 8/6. His mentation has been limiting his care -  pls see A & P for further details      Subjective:     Pulled NG yesterday- multiple attempts to replace it unsuccessful. Pt remains minimally verbal and uninteractive. Refusing all care today per RN including breathing treatment and suctioning.         Medications:  Reviewed    Infusion Medications    dextrose 100 mL/hr (08/09/18 0849)    dextrose 5% and 0.45% NaCl with KCl 20 mEq 50 mL/hr at 08/11/18 2051     Scheduled Medications    docusate  100 mg Oral BID    pantoprazole  40 mg Intravenous Daily    And    sodium chloride flush  10 mL Intravenous Daily    piperacillin-tazobactam  3.375 g Intravenous Q8H    aspirin  81 mg Oral Daily    apixaban  5 mg Oral BID    brimonidine  1 drop Both Eyes BID    dorzolamide-timolol  1 drop Both Eyes BID    latanoprost  1 drop Both Eyes Nightly    sodium chloride flush  10 mL Intravenous 2 times per day    lactobacillus  1 capsule Oral BID      PRN Meds: morphine, glucose, dextrose, glucagon (rDNA), dextrose, potassium chloride, magnesium sulfate, sodium phosphate IVPB **OR** sodium phosphate IVPB, sodium chloride flush, magnesium hydroxide, ondansetron      Intake/Output Summary (Last 24 hours) at 08/12/18 1526  Last data filed at 08/12/18 0956   Gross per 24 hour   Intake               40 ml   Output              375 ml   Net -335 ml       Physical Exam Performed:    /75   Pulse 84   Temp 98.1 °F (36.7 °C) (Axillary)   Resp 18   Ht 5' 9\" (1.753 m)   Wt 184 lb (83.5 kg)   SpO2 98%   BMI 27.17 kg/m²     General appearance: lethargic, not in overt resp distress,   HEENT: Pupils equal, round, Conjunctivae/corneas clear. Neck: Supple, with full range of motion. No jugular venous distention. Trachea midline. Respiratory: bibasilar rales   Cardiovascular: Regular rate and rhythm with normal S1/S2 without murmurs, rubs or gallops. Abdomen: Soft, non-tender, non-distended with normal bowel sounds. Musculoskeletal: No clubbing, cyanosis or edema bilaterally. Skin: Skin color, texture, turgor normal.  No rashes or lesions on exposed skin . Neurologic:  Awake  , following simple commands, moving all extremities spontaneously  Psychiatric: unable to assess   Capillary Refill: Brisk,< 3 seconds   Peripheral Pulses: +2 palpable, equal bilaterally       Labs:   Recent Labs      08/10/18   0600  08/11/18   0525  08/12/18   0620   WBC  12.6*  11.8*  11.1*   HGB  10.6*  9.9*  9.8*   HCT  31.5*  29.4*  28.6*   PLT  127*  134*  181     Recent Labs      08/10/18   2014  08/11/18   1432   NA  140  139   K  3.1*  3.6   CL  106  108   CO2  26  23   BUN  9  7   CREATININE  1.0  1.0   CALCIUM  7.6*  7.3*     No results for input(s): AST, ALT, BILIDIR, BILITOT, ALKPHOS in the last 72 hours. No results for input(s): INR in the last 72 hours. No results for input(s): King Cartwright in the last 72 hours. Urinalysis:      Lab Results   Component Value Date    NITRU POSITIVE 08/02/2018    WBCUA >100 08/02/2018    BACTERIA 1+ 08/02/2018    RBCUA  08/02/2018    BLOODU MODERATE 08/02/2018    SPECGRAV 1.010 08/02/2018    GLUCOSEU Negative 08/02/2018       Radiology:  XR CHEST 1 VW   Final Result   No significant change of bilateral airspace disease and bilateral pleural   effusions over the past 5 days.   The airspace disease is most likely   compressive atelectasis adjacent to the pleural effusion although   superimposed pneumonia cannot be excluded         XR ABDOMEN (KUB) (SINGLE AP VIEW)   Final Result   Couple mildly distended loops of small bowel in the left mid abdomen,   nonspecific, of uncertain significance. This could be due to early change of   developing obstruction given the provided history         CT ABDOMEN PELVIS WO CONTRAST Additional Contrast? None   Final Result   New inflammatory change surrounding the gallbladder, with gallbladder   distention, suggesting developing cholecystitis. Interval insertion of right-sided ureteral stent. There is decreased   right-sided hydronephrosis. .  Punctate stones remain in right kidney. Small stone marginates the ureteral stent in the proximal right ureter. Large bladder calculus appears unchanged. Scattered areas of colonic wall thickening are seen either due to incomplete   distention or superimposed colitis. Appendix is not well seen      Increased pleural effusions with increased lung consolidation      No change in cystic nodule in the region the pancreas         XR ABDOMEN (KUB) (SINGLE AP VIEW)   Final Result   Nasoenteric tube tip overlies the greater curvature of the stomach. XR CHEST PORTABLE   Final Result   Right IJ catheter has been pulled back with tip above the clavicle. Recommend repositioning. Some improvement in aeration of the lungs. XR CHEST PORTABLE   Final Result   Pleural effusions with increasing atelectasis and/or pneumonia. XR CHEST PORTABLE   Final Result   1. Bibasilar atelectasis and/or pneumonia. 2. The enteric tube is in good position in the stomach. XR ABDOMEN (KUB) (SINGLE AP VIEW)   Final Result   Successful placement of right ureteral stent.          CT ABDOMEN PELVIS WO CONTRAST Additional Contrast? None   Final Result   1. 4 mm obstructing nephrolithiasis within the right proximal ureter causing moderate hydronephrosis. 2. Bilateral lower lobe pneumonia. Recommend chest radiograph in 8 weeks to   confirm resolution. 3. Mildly dilated appendix without periappendiceal inflammation can be seen   in the setting of early acute appendicitis. 4. Unchanged 2.9 cm pancreatic cystic lesion. 5. Fecal impaction. XR CHEST PORTABLE   Final Result   Curvilinear opacity within the medial right lung base, which could represent   atelectasis, pneumonia, or aspiration. XR CHEST PORTABLE   Final Result   1. Right internal jugular catheter terminating in the mid SVC. CT HEAD WO CONTRAST   Final Result   No acute intracranial abnormality. FLUORO FOR SURGICAL PROCEDURES    (Results Pending)           Assessment/Plan:    Active Hospital Problems    Diagnosis Date Noted    Hypokalemia [E87.6] 08/08/2018    Bacteremia due to Pseudomonas [R78.81] 08/03/2018    Pulmonary infiltrates [R91.8] 08/03/2018    Nephrolithiasis [N20.0] 08/03/2018    Hydronephrosis with urinary obstruction due to renal calculus [N13.2] 08/03/2018    Fecal impaction ([address] [K56.41] 08/03/2018    Pancreatic cyst [K86.2] 08/03/2018    Acute respiratory insufficiency, postoperative [J95.89] 08/03/2018    Acute renal failure ([address] [N17.9]     Septic shock ([address] [A41.9, R65.21] 08/02/2018    Pyuria [N39.0] 08/02/2018    GONSALO (acute kidney injury) ([address] [N17.9] 08/02/2018    Lactic acidosis [E87.2] 08/02/2018    Elevated troponin [R74.8] 08/02/2018    Open-angle glaucoma [H40.10X0] 08/02/2018    Altered mental status [R41.82]     E coli bacteremia [R78.81] 06/17/2015    Sepsis ([address] [A41.9] 06/13/2015     Septic shock : required multiple pressors which has been weaned off . Likely E coli and P aeruginosa bacteremia. On  abx per ID. CT abd/pelvis on 8/7 showed possible acute cholecystitis. Gen surgery consulted . Not a surgical candidate given high mortality risk.   Pt overall poor prognosis - palliative care

## 2018-08-13 LAB
GLUCOSE BLD-MCNC: 101 MG/DL (ref 70–99)
GLUCOSE BLD-MCNC: 79 MG/DL (ref 70–99)
GLUCOSE BLD-MCNC: 80 MG/DL (ref 70–99)
GLUCOSE BLD-MCNC: 80 MG/DL (ref 70–99)
GLUCOSE BLD-MCNC: 90 MG/DL (ref 70–99)
GLUCOSE BLD-MCNC: 91 MG/DL (ref 70–99)
GLUCOSE BLD-MCNC: 98 MG/DL (ref 70–99)
HCT VFR BLD CALC: 28 % (ref 40.5–52.5)
HEMOGLOBIN: 9.5 G/DL (ref 13.5–17.5)
MCH RBC QN AUTO: 30.2 PG (ref 26–34)
MCHC RBC AUTO-ENTMCNC: 33.9 G/DL (ref 31–36)
MCV RBC AUTO: 88.9 FL (ref 80–100)
PDW BLD-RTO: 14.6 % (ref 12.4–15.4)
PERFORMED ON: ABNORMAL
PERFORMED ON: NORMAL
PLATELET # BLD: 236 K/UL (ref 135–450)
PMV BLD AUTO: 9.8 FL (ref 5–10.5)
RBC # BLD: 3.15 M/UL (ref 4.2–5.9)
WBC # BLD: 11.6 K/UL (ref 4–11)

## 2018-08-13 PROCEDURE — 2700000000 HC OXYGEN THERAPY PER DAY

## 2018-08-13 PROCEDURE — 2580000003 HC RX 258

## 2018-08-13 PROCEDURE — 2500000003 HC RX 250 WO HCPCS: Performed by: NURSE PRACTITIONER

## 2018-08-13 PROCEDURE — 2580000003 HC RX 258: Performed by: FAMILY MEDICINE

## 2018-08-13 PROCEDURE — 6360000002 HC RX W HCPCS: Performed by: INTERNAL MEDICINE

## 2018-08-13 PROCEDURE — 2580000003 HC RX 258: Performed by: INTERNAL MEDICINE

## 2018-08-13 PROCEDURE — 85027 COMPLETE CBC AUTOMATED: CPT

## 2018-08-13 PROCEDURE — 99231 SBSQ HOSP IP/OBS SF/LOW 25: CPT | Performed by: INTERNAL MEDICINE

## 2018-08-13 PROCEDURE — C9113 INJ PANTOPRAZOLE SODIUM, VIA: HCPCS | Performed by: INTERNAL MEDICINE

## 2018-08-13 PROCEDURE — 94761 N-INVAS EAR/PLS OXIMETRY MLT: CPT

## 2018-08-13 PROCEDURE — 2060000000 HC ICU INTERMEDIATE R&B

## 2018-08-13 RX ORDER — SODIUM CHLORIDE 9 MG/ML
INJECTION, SOLUTION INTRAVENOUS
Status: COMPLETED
Start: 2018-08-13 | End: 2018-08-13

## 2018-08-13 RX ADMIN — DORZOLAMIDE HYDROCHLORIDE AND TIMOLOL MALEATE 1 DROP: 20; 5 SOLUTION/ DROPS OPHTHALMIC at 20:07

## 2018-08-13 RX ADMIN — PIPERACILLIN AND TAZOBACTAM 3.38 G: 3; .375 INJECTION, POWDER, FOR SOLUTION INTRAVENOUS at 06:32

## 2018-08-13 RX ADMIN — PANTOPRAZOLE SODIUM 40 MG: 40 INJECTION, POWDER, FOR SOLUTION INTRAVENOUS at 08:09

## 2018-08-13 RX ADMIN — BRIMONIDINE TARTRATE 1 DROP: 2 SOLUTION OPHTHALMIC at 20:07

## 2018-08-13 RX ADMIN — SODIUM CHLORIDE, PRESERVATIVE FREE 10 ML: 5 INJECTION INTRAVENOUS at 20:08

## 2018-08-13 RX ADMIN — BRIMONIDINE TARTRATE 1 DROP: 2 SOLUTION OPHTHALMIC at 08:12

## 2018-08-13 RX ADMIN — SODIUM CHLORIDE 250 ML: 9 INJECTION, SOLUTION INTRAVENOUS at 23:42

## 2018-08-13 RX ADMIN — POTASSIUM CHLORIDE, DEXTROSE MONOHYDRATE AND SODIUM CHLORIDE: 150; 5; 450 INJECTION, SOLUTION INTRAVENOUS at 03:50

## 2018-08-13 RX ADMIN — SODIUM CHLORIDE, PRESERVATIVE FREE 10 ML: 5 INJECTION INTRAVENOUS at 08:09

## 2018-08-13 RX ADMIN — LATANOPROST 1 DROP: 50 SOLUTION OPHTHALMIC at 20:07

## 2018-08-13 RX ADMIN — SODIUM CHLORIDE, PRESERVATIVE FREE 10 ML: 5 INJECTION INTRAVENOUS at 08:11

## 2018-08-13 RX ADMIN — POTASSIUM CHLORIDE, DEXTROSE MONOHYDRATE AND SODIUM CHLORIDE: 150; 5; 450 INJECTION, SOLUTION INTRAVENOUS at 17:02

## 2018-08-13 RX ADMIN — PIPERACILLIN AND TAZOBACTAM 3.38 G: 3; .375 INJECTION, POWDER, FOR SOLUTION INTRAVENOUS at 15:32

## 2018-08-13 RX ADMIN — PIPERACILLIN AND TAZOBACTAM 3.38 G: 3; .375 INJECTION, POWDER, FOR SOLUTION INTRAVENOUS at 23:42

## 2018-08-13 RX ADMIN — PIPERACILLIN AND TAZOBACTAM 3.38 G: 3; .375 INJECTION, POWDER, FOR SOLUTION INTRAVENOUS at 00:07

## 2018-08-13 RX ADMIN — DORZOLAMIDE HYDROCHLORIDE AND TIMOLOL MALEATE 1 DROP: 20; 5 SOLUTION/ DROPS OPHTHALMIC at 08:12

## 2018-08-13 ASSESSMENT — PAIN SCALES - GENERAL
PAINLEVEL_OUTOF10: 0

## 2018-08-13 NOTE — PROGRESS NOTES
Suctioned with resistance from pt. Large amounts of thick mucus removed per yanker. Zak Payment given to pt and RN instructed pt that when he coughs up the mucus to put the yanker in his mouth and suction the mucus out. Pt attempting to follow directions with the yanker. Will continue to monitor.

## 2018-08-13 NOTE — PROGRESS NOTES
Hospitalist Progress Note      PCP: Love Gifford    Date of Admission: 8/2/2018    Chief Complaint: unresponsive     Hospital Course:   admitted with septic shock, high grade fever, acute kidney injury (KDIGO stage 3), acute encephalopathy required  multiple pressors and mechanical ventilation 30% FiO2 AC/VC.   CT abd showed obstructing stone.  Had cystoscopy and right ureteral stent (Dr. Franchesca Roberson) to relieve obstruction of right kidney. Blood cultures are positive for E coli and Pseudomonas - on IV abx per ID. Required multiple pressors which were eventually weaned off , extubated 8/6. His mentation has been limiting his care -  pls see A & P for further details      Subjective:   Patient refusing NG tube and any other nursing care. Pulled NG yesterday- multiple attempts to replace it unsuccessful. Pt remains minimally verbal and uninteractive. Refusing all care today per RN including breathing treatment and suctioning.        Medications:  Reviewed    Infusion Medications    dextrose Stopped (08/13/18 0700)    dextrose 5% and 0.45% NaCl with KCl 20 mEq 75 mL/hr at 08/13/18 0350     Scheduled Medications    docusate  100 mg Oral BID    pantoprazole  40 mg Intravenous Daily    And    sodium chloride flush  10 mL Intravenous Daily    piperacillin-tazobactam  3.375 g Intravenous Q8H    aspirin  81 mg Oral Daily    apixaban  5 mg Oral BID    brimonidine  1 drop Both Eyes BID    dorzolamide-timolol  1 drop Both Eyes BID    latanoprost  1 drop Both Eyes Nightly    sodium chloride flush  10 mL Intravenous 2 times per day    lactobacillus  1 capsule Oral BID      PRN Meds: morphine, glucose, dextrose, glucagon (rDNA), dextrose, potassium chloride, magnesium sulfate, sodium phosphate IVPB **OR** sodium phosphate IVPB, sodium chloride flush, magnesium hydroxide, ondansetron      Intake/Output Summary (Last 24 hours) at 08/13/18 1443  Last data filed at 08/13/18 1151   Gross per 24 hour   Intake 610 ml   Output             2600 ml   Net            -1990 ml       Physical Exam Performed:    /85   Pulse 79   Temp 98 °F (36.7 °C) (Oral)   Resp 16   Ht 5' 9\" (1.753 m)   Wt 183 lb 6.8 oz (83.2 kg)   SpO2 97%   BMI 27.09 kg/m²     General appearance: lethargic, not in overt resp distress,   HEENT: Pupils equal, round, Conjunctivae/corneas clear. Neck: Supple, with full range of motion. No jugular venous distention. Trachea midline. Respiratory: bibasilar rales   Cardiovascular: Regular rate and rhythm with normal S1/S2 without murmurs, rubs or gallops. Abdomen: Soft, non-tender, non-distended with normal bowel sounds. Musculoskeletal: No clubbing, cyanosis or edema bilaterally. Skin: Skin color, texture, turgor normal.  No rashes or lesions on exposed skin . Neurologic:  Awake  , following simple commands, moving all extremities spontaneously  Psychiatric: unable to assess   Capillary Refill: Brisk,< 3 seconds   Peripheral Pulses: +2 palpable, equal bilaterally       Labs:   Recent Labs      08/11/18   0525  08/12/18   0620  08/13/18   0609   WBC  11.8*  11.1*  11.6*   HGB  9.9*  9.8*  9.5*   HCT  29.4*  28.6*  28.0*   PLT  134*  181  236     Recent Labs      08/10/18   2014  08/11/18   1432   NA  140  139   K  3.1*  3.6   CL  106  108   CO2  26  23   BUN  9  7   CREATININE  1.0  1.0   CALCIUM  7.6*  7.3*       Urinalysis:      Lab Results   Component Value Date    NITRU POSITIVE 08/02/2018    WBCUA >100 08/02/2018    BACTERIA 1+ 08/02/2018    RBCUA  08/02/2018    BLOODU MODERATE 08/02/2018    SPECGRAV 1.010 08/02/2018    GLUCOSEU Negative 08/02/2018       Radiology:  XR CHEST 1 VW   Final Result   No significant change of bilateral airspace disease and bilateral pleural   effusions over the past 5 days.   The airspace disease is most likely   compressive atelectasis adjacent to the pleural effusion although   superimposed pneumonia cannot be excluded         XR ABDOMEN (KUB) (SINGLE AP appendix without periappendiceal inflammation can be seen   in the setting of early acute appendicitis. 4. Unchanged 2.9 cm pancreatic cystic lesion. 5. Fecal impaction. XR CHEST PORTABLE   Final Result   Curvilinear opacity within the medial right lung base, which could represent   atelectasis, pneumonia, or aspiration. XR CHEST PORTABLE   Final Result   1. Right internal jugular catheter terminating in the mid SVC. CT HEAD WO CONTRAST   Final Result   No acute intracranial abnormality. FLUORO FOR SURGICAL PROCEDURES    (Results Pending)           Assessment/Plan:    Active Hospital Problems    Diagnosis Date Noted    Hypokalemia [E87.6] 08/08/2018    Bacteremia due to Pseudomonas [R78.81] 08/03/2018    Pulmonary infiltrates [R91.8] 08/03/2018    Nephrolithiasis [N20.0] 08/03/2018    Hydronephrosis with urinary obstruction due to renal calculus [N13.2] 08/03/2018    Fecal impaction ([address] [K56.41] 08/03/2018    Pancreatic cyst [K86.2] 08/03/2018    Acute respiratory insufficiency, postoperative [J95.89] 08/03/2018    Acute renal failure ([address] [N17.9]     Septic shock ([address] [A41.9, R65.21] 08/02/2018    Pyuria [N39.0] 08/02/2018    GONSALO (acute kidney injury) ([address] [N17.9] 08/02/2018    Lactic acidosis [E87.2] 08/02/2018    Elevated troponin [R74.8] 08/02/2018    Open-angle glaucoma [H40.10X0] 08/02/2018    Altered mental status [R41.82]     E coli bacteremia [R78.81] 06/17/2015    Sepsis ([address] [A41.9] 06/13/2015     Septic shock : required multiple pressors which has been weaned off . Likely E coli and P aeruginosa bacteremia. On  abx per ID. CT abd/pelvis on 8/7 showed possible acute cholecystitis. Gen surgery consulted . Not a surgical candidate given high mortality risk. Pt overall poor prognosis - palliative care assisting.  Discussed with daughter , HPOA- not interested in pursuing  hospice at this time but may reconsider tomorrow depending on further

## 2018-08-13 NOTE — PROGRESS NOTES
With much insistence, patient does suction self to some degree. He does however continue to refuse RN suctioning. Patient while difficult to understand appears to say that he does not want anything.

## 2018-08-13 NOTE — PROGRESS NOTES
Bedside report received. 4 eyed skin assessment completed. No evidence of skin breakdown outside of charted values. Mepilex dsg on sacrum. Pt responsive but generally incomprehensible. Uncooperative with nursing cares. Repositioned at this time for comfort.      dextrose 100 mL/hr (08/09/18 0849)    dextrose 5% and 0.45% NaCl with KCl 20 mEq 75 mL/hr at 08/13/18 2604

## 2018-08-13 NOTE — PROGRESS NOTES
Pt's daughter called and stated that they do NOT want to have the NG tube placed again. Please discontinue order to place NG tube.

## 2018-08-13 NOTE — PROGRESS NOTES
Mildly dilated appendix without periappendiceal inflammation can be seen   in the setting of early acute appendicitis. 4. Unchanged 2.9 cm pancreatic cystic lesion. 5. Fecal impaction. CXR 8/6:  FINDINGS:   Endotracheal tube with tip approximately 4.9 cm from the rylee.  NG tube   courses below the diaphragm.  Right IJ catheter has been pulled back with tip   projecting above the clavicle.  Slight improvement in aeration of the mid and   upper lungs.  No substantial change otherwise. CT abd 8/7:  Impression   New inflammatory change surrounding the gallbladder, with gallbladder   distention, suggesting developing cholecystitis.       Interval insertion of right-sided ureteral stent.  There is decreased   right-sided hydronephrosis.  .  Punctate stones remain in right kidney. Small stone marginates the ureteral stent in the proximal right ureter.    Large bladder calculus appears unchanged.       Scattered areas of colonic wall thickening are seen either due to incomplete   distention or superimposed colitis.  Appendix is not well seen       Increased pleural effusions with increased lung consolidation       No change in cystic nodule in the region the pancreas         Assessment:     Patient Active Problem List    Diagnosis Date Noted    Hypokalemia 08/08/2018    Bacteremia due to Pseudomonas 08/03/2018    Pulmonary infiltrates 08/03/2018    Nephrolithiasis 08/03/2018    Hydronephrosis with urinary obstruction due to renal calculus 08/03/2018    Fecal impaction ([address] 08/03/2018    Pancreatic cyst 08/03/2018    Acute respiratory insufficiency, postoperative 08/03/2018    Acute renal failure ([address]     Septic shock ([address] 08/02/2018    Pyuria 08/02/2018    GONSALO (acute kidney injury) ([address] 08/02/2018    Lactic acidosis 08/02/2018    Elevated troponin 08/02/2018    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    Altered mental status     Infection due to ESBL-producing Escherichia coli 06/19/2015    E

## 2018-08-13 NOTE — PROGRESS NOTES
Than Needs    Nutrition Risk Level: High    Nutrition Interventions:    (nutrition pending decision for treatment plans)  Continued Inpatient Monitoring    Nutrition Evaluation:   · Evaluation: No progress toward goals   · Goals: Patient will tolerate enteral nutrition without GI distress or aspiration. · Monitoring:  (plan of care)    See Adult Nutrition Doc Flowsheet for more detail.      Electronically signed by Magdalena Alfaro [address] [place] [address],  on 8/13/18 at 2:59 PM    Contact Number: [phone]

## 2018-08-14 VITALS
WEIGHT: 184.08 LBS | BODY MASS INDEX: 27.27 KG/M2 | DIASTOLIC BLOOD PRESSURE: 86 MMHG | TEMPERATURE: 98.2 F | OXYGEN SATURATION: 99 % | RESPIRATION RATE: 20 BRPM | SYSTOLIC BLOOD PRESSURE: 142 MMHG | HEIGHT: 69 IN | HEART RATE: 84 BPM

## 2018-08-14 LAB
GLUCOSE BLD-MCNC: 103 MG/DL (ref 70–99)
GLUCOSE BLD-MCNC: 104 MG/DL (ref 70–99)
GLUCOSE BLD-MCNC: 107 MG/DL (ref 70–99)
GLUCOSE BLD-MCNC: 74 MG/DL (ref 70–99)
GLUCOSE BLD-MCNC: 79 MG/DL (ref 70–99)
HCT VFR BLD CALC: 30.7 % (ref 40.5–52.5)
HEMOGLOBIN: 10.3 G/DL (ref 13.5–17.5)
MCH RBC QN AUTO: 30.3 PG (ref 26–34)
MCHC RBC AUTO-ENTMCNC: 33.7 G/DL (ref 31–36)
MCV RBC AUTO: 89.8 FL (ref 80–100)
PDW BLD-RTO: 14.6 % (ref 12.4–15.4)
PERFORMED ON: ABNORMAL
PERFORMED ON: NORMAL
PERFORMED ON: NORMAL
PLATELET # BLD: 279 K/UL (ref 135–450)
PMV BLD AUTO: 9.8 FL (ref 5–10.5)
RBC # BLD: 3.42 M/UL (ref 4.2–5.9)
WBC # BLD: 10.5 K/UL (ref 4–11)

## 2018-08-14 PROCEDURE — 2580000003 HC RX 258: Performed by: INTERNAL MEDICINE

## 2018-08-14 PROCEDURE — 2580000003 HC RX 258: Performed by: FAMILY MEDICINE

## 2018-08-14 PROCEDURE — 6360000002 HC RX W HCPCS: Performed by: INTERNAL MEDICINE

## 2018-08-14 PROCEDURE — 2700000000 HC OXYGEN THERAPY PER DAY

## 2018-08-14 PROCEDURE — 94761 N-INVAS EAR/PLS OXIMETRY MLT: CPT

## 2018-08-14 PROCEDURE — C9113 INJ PANTOPRAZOLE SODIUM, VIA: HCPCS | Performed by: INTERNAL MEDICINE

## 2018-08-14 PROCEDURE — 85027 COMPLETE CBC AUTOMATED: CPT

## 2018-08-14 PROCEDURE — 36592 COLLECT BLOOD FROM PICC: CPT

## 2018-08-14 PROCEDURE — 2500000003 HC RX 250 WO HCPCS: Performed by: INTERNAL MEDICINE

## 2018-08-14 RX ADMIN — SODIUM CHLORIDE, PRESERVATIVE FREE 10 ML: 5 INJECTION INTRAVENOUS at 09:24

## 2018-08-14 RX ADMIN — PANTOPRAZOLE SODIUM 40 MG: 40 INJECTION, POWDER, FOR SOLUTION INTRAVENOUS at 09:24

## 2018-08-14 RX ADMIN — DORZOLAMIDE HYDROCHLORIDE AND TIMOLOL MALEATE 1 DROP: 20; 5 SOLUTION/ DROPS OPHTHALMIC at 09:25

## 2018-08-14 RX ADMIN — GLUCAGON HYDROCHLORIDE 1 MG: KIT at 17:06

## 2018-08-14 RX ADMIN — PIPERACILLIN AND TAZOBACTAM 3.38 G: 3; .375 INJECTION, POWDER, FOR SOLUTION INTRAVENOUS at 06:04

## 2018-08-14 RX ADMIN — BRIMONIDINE TARTRATE 1 DROP: 2 SOLUTION OPHTHALMIC at 09:25

## 2018-08-14 NOTE — DISCHARGE SUMMARY
Hospital Medicine Discharge Summary    Patient ID: Diana Douglas      Patient's PCP: Martha Romero    Admit Date: 8/2/2018     Discharge Date:   8/14/18    Admitting Physician: Christina Perez MD     Discharge Physician: Remy Shaw MD     Discharge Diagnoses: Active Hospital Problems    Diagnosis Date Noted    Hypokalemia [E87.6] 08/08/2018    Bacteremia due to Pseudomonas [R78.81] 08/03/2018    Pulmonary infiltrates [R91.8] 08/03/2018    Nephrolithiasis [N20.0] 08/03/2018    Hydronephrosis with urinary obstruction due to renal calculus [N13.2] 08/03/2018    Fecal impaction ([address] [K56.41] 08/03/2018    Pancreatic cyst [K86.2] 08/03/2018    Acute respiratory insufficiency, postoperative [J95.89] 08/03/2018    Acute renal failure ([address] [N17.9]     Septic shock ([address] [A41.9, R65.21] 08/02/2018    Pyuria [N39.0] 08/02/2018    GONSALO (acute kidney injury) ([address] [N17.9] 08/02/2018    Lactic acidosis [E87.2] 08/02/2018    Elevated troponin [R74.8] 08/02/2018    Open-angle glaucoma [H40.10X0] 08/02/2018    Altered mental status [R41.82]     E coli bacteremia [R78.81] 06/17/2015    Sepsis ([address] [A41.9] 06/13/2015       The patient was seen and examined on day of discharge and this discharge summary is in conjunction with any daily progress note from day of discharge. Hospital Course:     admitted with septic shock, high grade fever, acute kidney injury (KDIGO stage 3), acute encephalopathy required  multiple pressors and mechanical ventilation 30% FiO2 AC/VC.   CT abd showed obstructing stone.  Had cystoscopy and right ureteral stent (Dr. Rome Campos) to relieve obstruction of right kidney. Blood cultures are positive for E coli and Pseudomonas - on IV abx per ID. Required multiple pressors which were eventually weaned off , extubated 8/6. His mentation has been limiting his care  . Septic shock : required multiple pressors which has been weaned off .  Likely E coli and P aeruginosa past 5 days. The airspace disease is most likely   compressive atelectasis adjacent to the pleural effusion although   superimposed pneumonia cannot be excluded         XR ABDOMEN (KUB) (SINGLE AP VIEW)   Final Result   Couple mildly distended loops of small bowel in the left mid abdomen,   nonspecific, of uncertain significance. This could be due to early change of   developing obstruction given the provided history         CT ABDOMEN PELVIS WO CONTRAST Additional Contrast? None   Final Result   New inflammatory change surrounding the gallbladder, with gallbladder   distention, suggesting developing cholecystitis. Interval insertion of right-sided ureteral stent. There is decreased   right-sided hydronephrosis. .  Punctate stones remain in right kidney. Small stone marginates the ureteral stent in the proximal right ureter. Large bladder calculus appears unchanged. Scattered areas of colonic wall thickening are seen either due to incomplete   distention or superimposed colitis. Appendix is not well seen      Increased pleural effusions with increased lung consolidation      No change in cystic nodule in the region the pancreas         XR ABDOMEN (KUB) (SINGLE AP VIEW)   Final Result   Nasoenteric tube tip overlies the greater curvature of the stomach. XR CHEST PORTABLE   Final Result   Right IJ catheter has been pulled back with tip above the clavicle. Recommend repositioning. Some improvement in aeration of the lungs. XR CHEST PORTABLE   Final Result   Pleural effusions with increasing atelectasis and/or pneumonia. XR CHEST PORTABLE   Final Result   1. Bibasilar atelectasis and/or pneumonia. 2. The enteric tube is in good position in the stomach. XR ABDOMEN (KUB) (SINGLE AP VIEW)   Final Result   Successful placement of right ureteral stent.          CT ABDOMEN PELVIS WO CONTRAST Additional Contrast? None   Final Result   1. 4 mm obstructing

## 2018-09-01 PROBLEM — R77.8 ELEVATED TROPONIN: Status: RESOLVED | Noted: 2018-08-02 | Resolved: 2018-09-01

## 2019-01-17 ENCOUNTER — HOSPITAL ENCOUNTER (EMERGENCY)
Age: 84
Discharge: NURSING, REHAB OR LONG-TERM CARE | End: 2019-01-17
Attending: EMERGENCY MEDICINE
Payer: COMMERCIAL

## 2019-01-17 VITALS
DIASTOLIC BLOOD PRESSURE: 69 MMHG | TEMPERATURE: 97.6 F | HEART RATE: 85 BPM | SYSTOLIC BLOOD PRESSURE: 107 MMHG | RESPIRATION RATE: 16 BRPM | OXYGEN SATURATION: 100 %

## 2019-01-17 DIAGNOSIS — T83.010A SUPRAPUBIC CATHETER DYSFUNCTION, INITIAL ENCOUNTER (HCC): Primary | ICD-10-CM

## 2019-01-17 PROCEDURE — 99283 EMERGENCY DEPT VISIT LOW MDM: CPT

## 2019-01-17 PROCEDURE — 51701 INSERT BLADDER CATHETER: CPT

## 2019-02-18 RX ORDER — HYDRALAZINE HYDROCHLORIDE 25 MG/1
25 TABLET, FILM COATED ORAL DAILY
COMMUNITY

## 2019-02-18 RX ORDER — ACETAMINOPHEN 325 MG/1
650 TABLET ORAL EVERY 6 HOURS PRN
COMMUNITY
End: 2019-05-24

## 2019-02-18 RX ORDER — AMLODIPINE BESYLATE 10 MG/1
5 TABLET ORAL DAILY
COMMUNITY

## 2019-02-21 ENCOUNTER — ANESTHESIA EVENT (OUTPATIENT)
Dept: OPERATING ROOM | Age: 84
End: 2019-02-21
Payer: MEDICARE

## 2019-02-22 ENCOUNTER — HOSPITAL ENCOUNTER (OUTPATIENT)
Dept: GENERAL RADIOLOGY | Age: 84
Setting detail: OUTPATIENT SURGERY
Discharge: HOME | End: 2019-02-22
Attending: UROLOGY
Payer: MEDICARE

## 2019-02-22 ENCOUNTER — ANESTHESIA (OUTPATIENT)
Dept: OPERATING ROOM | Age: 84
End: 2019-02-22
Payer: MEDICARE

## 2019-02-22 ENCOUNTER — HOSPITAL ENCOUNTER (OUTPATIENT)
Age: 84
Setting detail: OUTPATIENT SURGERY
Discharge: NURSING, REHAB OR LONG-TERM CARE | End: 2019-02-22
Attending: UROLOGY | Admitting: UROLOGY
Payer: MEDICARE

## 2019-02-22 ENCOUNTER — HOSPITAL ENCOUNTER (OUTPATIENT)
Dept: GENERAL RADIOLOGY | Age: 84
Discharge: HOME | End: 2019-02-22
Attending: UROLOGY
Payer: MEDICARE

## 2019-02-22 VITALS
HEART RATE: 67 BPM | TEMPERATURE: 97.8 F | BODY MASS INDEX: 27.18 KG/M2 | SYSTOLIC BLOOD PRESSURE: 115 MMHG | DIASTOLIC BLOOD PRESSURE: 67 MMHG | RESPIRATION RATE: 16 BRPM | OXYGEN SATURATION: 98 % | HEIGHT: 69 IN

## 2019-02-22 VITALS
DIASTOLIC BLOOD PRESSURE: 72 MMHG | OXYGEN SATURATION: 100 % | SYSTOLIC BLOOD PRESSURE: 144 MMHG | RESPIRATION RATE: 15 BRPM

## 2019-02-22 DIAGNOSIS — N20.0 KIDNEY CALCULUS: ICD-10-CM

## 2019-02-22 PROCEDURE — 3700000001 HC ADD 15 MINUTES (ANESTHESIA): Performed by: UROLOGY

## 2019-02-22 PROCEDURE — 3600000004 HC SURGERY LEVEL 4 BASE: Performed by: UROLOGY

## 2019-02-22 PROCEDURE — 3700000000 HC ANESTHESIA ATTENDED CARE: Performed by: UROLOGY

## 2019-02-22 PROCEDURE — 7100000000 HC PACU RECOVERY - FIRST 15 MIN: Performed by: UROLOGY

## 2019-02-22 PROCEDURE — 2580000003 HC RX 258: Performed by: ANESTHESIOLOGY

## 2019-02-22 PROCEDURE — 2580000003 HC RX 258: Performed by: UROLOGY

## 2019-02-22 PROCEDURE — C1769 GUIDE WIRE: HCPCS | Performed by: UROLOGY

## 2019-02-22 PROCEDURE — 74018 RADEX ABDOMEN 1 VIEW: CPT

## 2019-02-22 PROCEDURE — 7100000011 HC PHASE II RECOVERY - ADDTL 15 MIN: Performed by: UROLOGY

## 2019-02-22 PROCEDURE — 7100000010 HC PHASE II RECOVERY - FIRST 15 MIN: Performed by: UROLOGY

## 2019-02-22 PROCEDURE — 6360000002 HC RX W HCPCS: Performed by: NURSE ANESTHETIST, CERTIFIED REGISTERED

## 2019-02-22 PROCEDURE — 6360000002 HC RX W HCPCS: Performed by: UROLOGY

## 2019-02-22 PROCEDURE — 7100000001 HC PACU RECOVERY - ADDTL 15 MIN: Performed by: UROLOGY

## 2019-02-22 PROCEDURE — C2617 STENT, NON-COR, TEM W/O DEL: HCPCS | Performed by: UROLOGY

## 2019-02-22 PROCEDURE — 2500000003 HC RX 250 WO HCPCS: Performed by: NURSE ANESTHETIST, CERTIFIED REGISTERED

## 2019-02-22 PROCEDURE — 3600000014 HC SURGERY LEVEL 4 ADDTL 15MIN: Performed by: UROLOGY

## 2019-02-22 PROCEDURE — 2580000003 HC RX 258: Performed by: NURSE ANESTHETIST, CERTIFIED REGISTERED

## 2019-02-22 PROCEDURE — C1758 CATHETER, URETERAL: HCPCS | Performed by: UROLOGY

## 2019-02-22 PROCEDURE — 2709999900 HC NON-CHARGEABLE SUPPLY: Performed by: UROLOGY

## 2019-02-22 DEVICE — STENT URET 6FR L26CM PERCFLX HYDR+ TAPR TIP GRAD: Type: IMPLANTABLE DEVICE | Site: URETER | Status: FUNCTIONAL

## 2019-02-22 RX ORDER — PROPOFOL 10 MG/ML
INJECTION, EMULSION INTRAVENOUS PRN
Status: DISCONTINUED | OUTPATIENT
Start: 2019-02-22 | End: 2019-02-22 | Stop reason: SDUPTHER

## 2019-02-22 RX ORDER — FENTANYL CITRATE 50 UG/ML
INJECTION, SOLUTION INTRAMUSCULAR; INTRAVENOUS PRN
Status: DISCONTINUED | OUTPATIENT
Start: 2019-02-22 | End: 2019-02-22 | Stop reason: SDUPTHER

## 2019-02-22 RX ORDER — SODIUM CHLORIDE, SODIUM LACTATE, POTASSIUM CHLORIDE, CALCIUM CHLORIDE 600; 310; 30; 20 MG/100ML; MG/100ML; MG/100ML; MG/100ML
INJECTION, SOLUTION INTRAVENOUS CONTINUOUS
Status: DISCONTINUED | OUTPATIENT
Start: 2019-02-22 | End: 2019-02-22 | Stop reason: HOSPADM

## 2019-02-22 RX ORDER — AMOXICILLIN AND CLAVULANATE POTASSIUM 875; 125 MG/1; MG/1
1 TABLET, FILM COATED ORAL 2 TIMES DAILY
Qty: 10 TABLET | Refills: 0 | Status: SHIPPED | OUTPATIENT
Start: 2019-02-22 | End: 2019-02-27

## 2019-02-22 RX ORDER — SODIUM CHLORIDE, SODIUM LACTATE, POTASSIUM CHLORIDE, CALCIUM CHLORIDE 600; 310; 30; 20 MG/100ML; MG/100ML; MG/100ML; MG/100ML
INJECTION, SOLUTION INTRAVENOUS CONTINUOUS PRN
Status: DISCONTINUED | OUTPATIENT
Start: 2019-02-22 | End: 2019-02-22 | Stop reason: SDUPTHER

## 2019-02-22 RX ORDER — MAGNESIUM HYDROXIDE 1200 MG/15ML
LIQUID ORAL PRN
Status: DISCONTINUED | OUTPATIENT
Start: 2019-02-22 | End: 2019-02-22 | Stop reason: ALTCHOICE

## 2019-02-22 RX ORDER — LIDOCAINE HYDROCHLORIDE 20 MG/ML
INJECTION, SOLUTION INFILTRATION; PERINEURAL PRN
Status: DISCONTINUED | OUTPATIENT
Start: 2019-02-22 | End: 2019-02-22 | Stop reason: SDUPTHER

## 2019-02-22 RX ADMIN — LIDOCAINE HYDROCHLORIDE 20 MG: 20 INJECTION, SOLUTION INFILTRATION; PERINEURAL at 09:59

## 2019-02-22 RX ADMIN — SODIUM CHLORIDE, POTASSIUM CHLORIDE, SODIUM LACTATE AND CALCIUM CHLORIDE: 600; 310; 30; 20 INJECTION, SOLUTION INTRAVENOUS at 09:59

## 2019-02-22 RX ADMIN — FENTANYL CITRATE 100 MCG: 50 INJECTION INTRAMUSCULAR; INTRAVENOUS at 09:59

## 2019-02-22 RX ADMIN — PROPOFOL 150 MG: 10 INJECTION, EMULSION INTRAVENOUS at 09:59

## 2019-02-22 RX ADMIN — GENTAMICIN SULFATE 120 MG: 40 INJECTION, SOLUTION INTRAMUSCULAR; INTRAVENOUS at 10:08

## 2019-02-22 RX ADMIN — SODIUM CHLORIDE, POTASSIUM CHLORIDE, SODIUM LACTATE AND CALCIUM CHLORIDE: 600; 310; 30; 20 INJECTION, SOLUTION INTRAVENOUS at 08:18

## 2019-02-22 ASSESSMENT — PULMONARY FUNCTION TESTS
PIF_VALUE: 13
PIF_VALUE: 14
PIF_VALUE: 1
PIF_VALUE: 1
PIF_VALUE: 0
PIF_VALUE: 15
PIF_VALUE: 0
PIF_VALUE: 4
PIF_VALUE: 13
PIF_VALUE: 19
PIF_VALUE: 6
PIF_VALUE: 21
PIF_VALUE: 13
PIF_VALUE: 14
PIF_VALUE: 14
PIF_VALUE: 17
PIF_VALUE: 3
PIF_VALUE: 2
PIF_VALUE: 2
PIF_VALUE: 3
PIF_VALUE: 18
PIF_VALUE: 14
PIF_VALUE: 0
PIF_VALUE: 14
PIF_VALUE: 7
PIF_VALUE: 0
PIF_VALUE: 20
PIF_VALUE: 19
PIF_VALUE: 3
PIF_VALUE: 4
PIF_VALUE: 10
PIF_VALUE: 14
PIF_VALUE: 13
PIF_VALUE: 0
PIF_VALUE: 0
PIF_VALUE: 14
PIF_VALUE: 7
PIF_VALUE: 4
PIF_VALUE: 5
PIF_VALUE: 5

## 2019-02-22 ASSESSMENT — PAIN SCALES - GENERAL
PAINLEVEL_OUTOF10: 0
PAINLEVEL_OUTOF10: 0

## 2019-02-22 ASSESSMENT — PAIN - FUNCTIONAL ASSESSMENT: PAIN_FUNCTIONAL_ASSESSMENT: 0-10

## 2019-05-24 RX ORDER — ACETAMINOPHEN 500 MG
500 TABLET ORAL EVERY 6 HOURS PRN
COMMUNITY

## 2019-05-30 ENCOUNTER — ANESTHESIA EVENT (OUTPATIENT)
Dept: OPERATING ROOM | Age: 84
End: 2019-05-30
Payer: MEDICARE

## 2019-05-31 ENCOUNTER — HOSPITAL ENCOUNTER (OUTPATIENT)
Dept: GENERAL RADIOLOGY | Age: 84
Setting detail: OUTPATIENT SURGERY
Discharge: HOME | End: 2019-05-31
Attending: UROLOGY
Payer: MEDICARE

## 2019-05-31 ENCOUNTER — HOSPITAL ENCOUNTER (OUTPATIENT)
Age: 84
Setting detail: OUTPATIENT SURGERY
Discharge: HOME | End: 2019-05-31
Attending: UROLOGY | Admitting: UROLOGY
Payer: MEDICARE

## 2019-05-31 ENCOUNTER — ANESTHESIA (OUTPATIENT)
Dept: OPERATING ROOM | Age: 84
End: 2019-05-31
Payer: MEDICARE

## 2019-05-31 VITALS
RESPIRATION RATE: 22 BRPM | BODY MASS INDEX: 21.04 KG/M2 | WEIGHT: 142.5 LBS | OXYGEN SATURATION: 94 % | DIASTOLIC BLOOD PRESSURE: 54 MMHG | HEART RATE: 102 BPM | SYSTOLIC BLOOD PRESSURE: 114 MMHG | TEMPERATURE: 97 F

## 2019-05-31 VITALS
DIASTOLIC BLOOD PRESSURE: 63 MMHG | RESPIRATION RATE: 5 BRPM | OXYGEN SATURATION: 100 % | TEMPERATURE: 98.6 F | SYSTOLIC BLOOD PRESSURE: 108 MMHG

## 2019-05-31 DIAGNOSIS — N13.2 HYDRONEPHROSIS WITH URINARY OBSTRUCTION DUE TO RENAL CALCULUS: Primary | ICD-10-CM

## 2019-05-31 DIAGNOSIS — R52 PAIN: ICD-10-CM

## 2019-05-31 LAB
EKG ATRIAL RATE: 86 BPM
EKG DIAGNOSIS: NORMAL
EKG P AXIS: 93 DEGREES
EKG P-R INTERVAL: 242 MS
EKG Q-T INTERVAL: 414 MS
EKG QRS DURATION: 88 MS
EKG QTC CALCULATION (BAZETT): 495 MS
EKG R AXIS: -13 DEGREES
EKG T AXIS: 269 DEGREES
EKG VENTRICULAR RATE: 86 BPM

## 2019-05-31 PROCEDURE — 7100000001 HC PACU RECOVERY - ADDTL 15 MIN: Performed by: UROLOGY

## 2019-05-31 PROCEDURE — 93010 ELECTROCARDIOGRAM REPORT: CPT | Performed by: INTERNAL MEDICINE

## 2019-05-31 PROCEDURE — 7100000000 HC PACU RECOVERY - FIRST 15 MIN: Performed by: UROLOGY

## 2019-05-31 PROCEDURE — 2500000003 HC RX 250 WO HCPCS: Performed by: NURSE ANESTHETIST, CERTIFIED REGISTERED

## 2019-05-31 PROCEDURE — 6360000002 HC RX W HCPCS: Performed by: NURSE ANESTHETIST, CERTIFIED REGISTERED

## 2019-05-31 PROCEDURE — 93005 ELECTROCARDIOGRAM TRACING: CPT | Performed by: ANESTHESIOLOGY

## 2019-05-31 PROCEDURE — 2709999900 HC NON-CHARGEABLE SUPPLY: Performed by: UROLOGY

## 2019-05-31 PROCEDURE — 3209999900 FLUORO FOR SURGICAL PROCEDURES

## 2019-05-31 PROCEDURE — 7100000011 HC PHASE II RECOVERY - ADDTL 15 MIN: Performed by: UROLOGY

## 2019-05-31 PROCEDURE — 2580000003 HC RX 258: Performed by: ANESTHESIOLOGY

## 2019-05-31 PROCEDURE — 7100000010 HC PHASE II RECOVERY - FIRST 15 MIN: Performed by: UROLOGY

## 2019-05-31 PROCEDURE — 3600000004 HC SURGERY LEVEL 4 BASE: Performed by: UROLOGY

## 2019-05-31 PROCEDURE — 74018 RADEX ABDOMEN 1 VIEW: CPT

## 2019-05-31 PROCEDURE — 6360000002 HC RX W HCPCS

## 2019-05-31 PROCEDURE — C2617 STENT, NON-COR, TEM W/O DEL: HCPCS | Performed by: UROLOGY

## 2019-05-31 PROCEDURE — 3600000014 HC SURGERY LEVEL 4 ADDTL 15MIN: Performed by: UROLOGY

## 2019-05-31 PROCEDURE — 2580000003 HC RX 258: Performed by: UROLOGY

## 2019-05-31 PROCEDURE — 6360000002 HC RX W HCPCS: Performed by: UROLOGY

## 2019-05-31 PROCEDURE — 3700000000 HC ANESTHESIA ATTENDED CARE: Performed by: UROLOGY

## 2019-05-31 PROCEDURE — 3700000001 HC ADD 15 MINUTES (ANESTHESIA): Performed by: UROLOGY

## 2019-05-31 DEVICE — URETERAL STENT
Type: IMPLANTABLE DEVICE | Site: URETER | Status: FUNCTIONAL
Brand: CONTOUR™

## 2019-05-31 RX ORDER — NITROFURANTOIN MACROCRYSTALS 100 MG/1
100 CAPSULE ORAL 2 TIMES DAILY
Qty: 14 CAPSULE | Refills: 0 | Status: SHIPPED | OUTPATIENT
Start: 2019-05-31

## 2019-05-31 RX ORDER — TRAMADOL HYDROCHLORIDE 50 MG/1
50 TABLET ORAL EVERY 6 HOURS PRN
Qty: 12 TABLET | Refills: 0 | Status: SHIPPED | OUTPATIENT
Start: 2019-05-31 | End: 2019-06-03

## 2019-05-31 RX ORDER — ONDANSETRON 2 MG/ML
INJECTION INTRAMUSCULAR; INTRAVENOUS PRN
Status: DISCONTINUED | OUTPATIENT
Start: 2019-05-31 | End: 2019-05-31 | Stop reason: SDUPTHER

## 2019-05-31 RX ORDER — PROPOFOL 10 MG/ML
INJECTION, EMULSION INTRAVENOUS PRN
Status: DISCONTINUED | OUTPATIENT
Start: 2019-05-31 | End: 2019-05-31 | Stop reason: SDUPTHER

## 2019-05-31 RX ORDER — MORPHINE SULFATE 100 MG/5ML
5 SOLUTION ORAL EVERY 6 HOURS
COMMUNITY

## 2019-05-31 RX ORDER — SODIUM CHLORIDE, SODIUM LACTATE, POTASSIUM CHLORIDE, CALCIUM CHLORIDE 600; 310; 30; 20 MG/100ML; MG/100ML; MG/100ML; MG/100ML
INJECTION, SOLUTION INTRAVENOUS CONTINUOUS
Status: DISCONTINUED | OUTPATIENT
Start: 2019-05-31 | End: 2019-05-31 | Stop reason: HOSPADM

## 2019-05-31 RX ORDER — ROCURONIUM BROMIDE 10 MG/ML
INJECTION, SOLUTION INTRAVENOUS PRN
Status: DISCONTINUED | OUTPATIENT
Start: 2019-05-31 | End: 2019-05-31 | Stop reason: SDUPTHER

## 2019-05-31 RX ORDER — LIDOCAINE HYDROCHLORIDE 20 MG/ML
INJECTION, SOLUTION INFILTRATION; PERINEURAL PRN
Status: DISCONTINUED | OUTPATIENT
Start: 2019-05-31 | End: 2019-05-31 | Stop reason: SDUPTHER

## 2019-05-31 RX ORDER — MAGNESIUM HYDROXIDE 1200 MG/15ML
LIQUID ORAL PRN
Status: DISCONTINUED | OUTPATIENT
Start: 2019-05-31 | End: 2019-05-31 | Stop reason: ALTCHOICE

## 2019-05-31 RX ORDER — LIDOCAINE HYDROCHLORIDE 10 MG/ML
1 INJECTION, SOLUTION EPIDURAL; INFILTRATION; INTRACAUDAL; PERINEURAL
Status: DISCONTINUED | OUTPATIENT
Start: 2019-05-31 | End: 2019-05-31 | Stop reason: HOSPADM

## 2019-05-31 RX ORDER — NITROFURANTOIN MACROCRYSTALS 100 MG/1
100 CAPSULE ORAL 2 TIMES DAILY
Status: ON HOLD | COMMUNITY
End: 2019-05-31 | Stop reason: SDUPTHER

## 2019-05-31 RX ORDER — SODIUM CHLORIDE 0.9 % (FLUSH) 0.9 %
10 SYRINGE (ML) INJECTION EVERY 12 HOURS SCHEDULED
Status: DISCONTINUED | OUTPATIENT
Start: 2019-05-31 | End: 2019-05-31 | Stop reason: HOSPADM

## 2019-05-31 RX ORDER — CEFAZOLIN SODIUM 1 G/3ML
INJECTION, POWDER, FOR SOLUTION INTRAMUSCULAR; INTRAVENOUS PRN
Status: DISCONTINUED | OUTPATIENT
Start: 2019-05-31 | End: 2019-05-31 | Stop reason: SDUPTHER

## 2019-05-31 RX ORDER — SODIUM CHLORIDE 0.9 % (FLUSH) 0.9 %
10 SYRINGE (ML) INJECTION PRN
Status: DISCONTINUED | OUTPATIENT
Start: 2019-05-31 | End: 2019-05-31 | Stop reason: HOSPADM

## 2019-05-31 RX ORDER — FENTANYL CITRATE 50 UG/ML
INJECTION, SOLUTION INTRAMUSCULAR; INTRAVENOUS PRN
Status: DISCONTINUED | OUTPATIENT
Start: 2019-05-31 | End: 2019-05-31 | Stop reason: SDUPTHER

## 2019-05-31 RX ADMIN — PHENYLEPHRINE HYDROCHLORIDE 200 MCG: 10 INJECTION INTRAVENOUS at 10:10

## 2019-05-31 RX ADMIN — CEFAZOLIN 2000 MG: 1 INJECTION, POWDER, FOR SOLUTION INTRAMUSCULAR; INTRAVENOUS at 09:53

## 2019-05-31 RX ADMIN — PROPOFOL 100 MG: 10 INJECTION, EMULSION INTRAVENOUS at 09:55

## 2019-05-31 RX ADMIN — FENTANYL CITRATE 50 MCG: 50 INJECTION, SOLUTION INTRAMUSCULAR; INTRAVENOUS at 09:56

## 2019-05-31 RX ADMIN — PHENYLEPHRINE HYDROCHLORIDE 200 MCG: 10 INJECTION INTRAVENOUS at 10:18

## 2019-05-31 RX ADMIN — CEFTRIAXONE SODIUM 1 G: 1 INJECTION, POWDER, FOR SOLUTION INTRAMUSCULAR; INTRAVENOUS at 10:13

## 2019-05-31 RX ADMIN — SODIUM CHLORIDE, POTASSIUM CHLORIDE, SODIUM LACTATE AND CALCIUM CHLORIDE: 600; 310; 30; 20 INJECTION, SOLUTION INTRAVENOUS at 09:35

## 2019-05-31 RX ADMIN — HYDROMORPHONE HYDROCHLORIDE 0.5 MG: 1 INJECTION, SOLUTION INTRAMUSCULAR; INTRAVENOUS; SUBCUTANEOUS at 11:27

## 2019-05-31 RX ADMIN — PHENYLEPHRINE HYDROCHLORIDE 200 MCG: 10 INJECTION INTRAVENOUS at 10:05

## 2019-05-31 RX ADMIN — ROCURONIUM BROMIDE 10 MG: 10 SOLUTION INTRAVENOUS at 09:55

## 2019-05-31 RX ADMIN — SODIUM CHLORIDE, POTASSIUM CHLORIDE, SODIUM LACTATE AND CALCIUM CHLORIDE: 600; 310; 30; 20 INJECTION, SOLUTION INTRAVENOUS at 08:24

## 2019-05-31 RX ADMIN — ONDANSETRON 4 MG: 2 INJECTION INTRAMUSCULAR; INTRAVENOUS at 10:13

## 2019-05-31 RX ADMIN — FENTANYL CITRATE 50 MCG: 50 INJECTION, SOLUTION INTRAMUSCULAR; INTRAVENOUS at 10:29

## 2019-05-31 RX ADMIN — FENTANYL CITRATE 50 MCG: 50 INJECTION, SOLUTION INTRAMUSCULAR; INTRAVENOUS at 10:12

## 2019-05-31 RX ADMIN — Medication 0.5 MG: at 11:27

## 2019-05-31 RX ADMIN — FENTANYL CITRATE 100 MCG: 50 INJECTION, SOLUTION INTRAMUSCULAR; INTRAVENOUS at 09:53

## 2019-05-31 RX ADMIN — LIDOCAINE HYDROCHLORIDE 3 ML: 20 INJECTION, SOLUTION INFILTRATION; PERINEURAL at 09:55

## 2019-05-31 RX ADMIN — PHENYLEPHRINE HYDROCHLORIDE 200 MCG: 10 INJECTION INTRAVENOUS at 10:07

## 2019-05-31 ASSESSMENT — PULMONARY FUNCTION TESTS
PIF_VALUE: 5
PIF_VALUE: 1
PIF_VALUE: 4
PIF_VALUE: 2
PIF_VALUE: 3
PIF_VALUE: 5
PIF_VALUE: 5
PIF_VALUE: 3
PIF_VALUE: 2
PIF_VALUE: 11
PIF_VALUE: 5
PIF_VALUE: 2
PIF_VALUE: 5
PIF_VALUE: 0
PIF_VALUE: 5
PIF_VALUE: 9
PIF_VALUE: 0
PIF_VALUE: 0
PIF_VALUE: 2
PIF_VALUE: 0
PIF_VALUE: 3
PIF_VALUE: 4
PIF_VALUE: 3
PIF_VALUE: 5
PIF_VALUE: 3
PIF_VALUE: 6
PIF_VALUE: 4
PIF_VALUE: 3
PIF_VALUE: 7
PIF_VALUE: 2
PIF_VALUE: 3
PIF_VALUE: 6
PIF_VALUE: 4
PIF_VALUE: 4
PIF_VALUE: 3
PIF_VALUE: 7
PIF_VALUE: 0
PIF_VALUE: 6
PIF_VALUE: 3
PIF_VALUE: 0
PIF_VALUE: 11
PIF_VALUE: 0
PIF_VALUE: 4
PIF_VALUE: 0
PIF_VALUE: 3
PIF_VALUE: 3
PIF_VALUE: 4
PIF_VALUE: 0
PIF_VALUE: 2
PIF_VALUE: 13
PIF_VALUE: 2

## 2019-05-31 ASSESSMENT — PAIN SCALES - GENERAL: PAINLEVEL_OUTOF10: 8

## 2019-05-31 ASSESSMENT — PAIN - FUNCTIONAL ASSESSMENT: PAIN_FUNCTIONAL_ASSESSMENT: 0-10

## 2019-05-31 NOTE — H&P
I have reviewed the patient's history and physical and examined the patient and find no relevant changes.

## 2019-05-31 NOTE — PROGRESS NOTES
4 Eyes Skin Assessment     The patient is being assess for   Admission    I agree that 2 RN's have performed a thorough Head to Toe Skin Assessment on the patient. ALL assessment sites listed below have been assessed. Areas assessed by both nurses:   [x]   Head, Face, and Ears   [x]   Shoulders, Back, and Chest, Abdomen  [x]   Arms, Elbows, and Hands   [x]   Coccyx, Sacrum, and Ischium  [x]   Legs, Feet, and Heels        Scab observed to right lower shin and left lower shin. Scarring to buttocks, scab to right elbow    **SHARE this note so that the co-signing nurse is able to place an eSignature**    Co-signer eSignature: Electronically signed by Kiarra Rosales RN on 5/31/19 at 9:12 AM    Does the Patient have Skin Breakdown?   No          Naldo Prevention initiated:  Yes   Wound Care Orders initiated:  No      [facility] nurse consulted for Pressure Injury (Stage 3,4, Unstageable, DTI, NWPT, Complex wounds)and New or Established Ostomies:  No      Primary Nurse eSignature: Electronically signed by Papo Guerrero RN on 5/31/19 at 8:30 AM

## 2019-05-31 NOTE — ANESTHESIA POSTPROCEDURE EVALUATION
Department of Anesthesiology  Postprocedure Note    Patient: Richard Kline  MRN: [id number]  YOB: 1928  Date of evaluation: 5/31/2019  Time:  4:57 PM     Procedure Summary     Date:  05/31/19 Room / Location:  [address] OR 03 / [address] OR    Anesthesia Start:  4097 Anesthesia Stop:  [id number]    Procedure:  CYSTOSCOPY, RIGHT URETEROSCOPY, RIGHT URETERAL STENT EXCHANGE, SUPRA PUBIC CATHETER CHANGE, HOLMIUM LASER LITHOTRIPSY  CPT CODE - 20452 (Right Bladder) Diagnosis:       Hydronephrosis, unspecified hydronephrosis type      (HYDRONEPHROSIS)    Surgeon:  Santos Rowley MD Responsible Provider:  Nigel Ruiz MD    Anesthesia Type:  general ASA Status:  3          Anesthesia Type: general    Polina Phase I: Polina Score: 7    Polina Phase II: Polina Score: 8    Last vitals: Reviewed and per EMR flowsheets.        Anesthesia Post Evaluation    Comments: Postoperative Anesthesia Note    Name:    Richard Kline  MRN:      [id number]    Patient Vitals in the past 12 hrs:  05/31/19 1221, BP:(!) 114/54, SpO2:94 %  05/31/19 1200, Pulse:102, Resp:22, SpO2:94 %  05/31/19 1150, BP:(!) 112/52, Pulse:109, Resp:28, SpO2:94 %  05/31/19 1145, BP:(!) 110/54, Pulse:105, Resp:20, SpO2:93 %  05/31/19 1140, BP:119/62, Pulse:108, Resp:26, SpO2:92 %  05/31/19 1135, BP:106/74, Pulse:101, Resp:23, SpO2:93 %  05/31/19 1130, BP:125/64, Pulse:102, Resp:26, SpO2:93 %  05/31/19 1125, Pulse:101, Resp:30, SpO2:93 %  05/31/19 1120, Pulse:100, Resp:25, SpO2:96 %  05/31/19 1115, Pulse:95, Resp:28, SpO2:94 %  05/31/19 1055, BP:(!) 109/91, Resp:26, SpO2:95 %  05/31/19 1052, Temp:97 °F (36.1 °C)  05/31/19 0805, BP:124/65, Temp:98.2 °F (36.8 °C), Temp src:Temporal, Pulse:90, Resp:12, SpO2:95 %, Weight:142 lb 8 oz (64.6 kg)     LABS:    CBC  Lab Results       Component                Value               Date/Time                  WBC                      10.5                08/14/2018 06:00 AM        HGB                      10.3 (L) 08/14/2018 06:00 AM        HCT                      30.7 (L)            08/14/2018 06:00 AM        PLT                      279                 08/14/2018 06:00 AM   RENAL  Lab Results       Component                Value               Date/Time                  NA                       139                 08/11/2018 02:32 PM        K                        3.6                 08/11/2018 02:32 PM        K                        3.5                 08/03/2018 04:43 AM        CL                       108                 08/11/2018 02:32 PM        CO2                      23                  08/11/2018 02:32 PM        BUN                      7                   08/11/2018 02:32 PM        CREATININE               1.0                 08/11/2018 02:32 PM        GLUCOSE                  84                  08/11/2018 02:32 PM   COAGS  Lab Results       Component                Value               Date/Time                  PROTIME                  18.1 (H)            06/23/2015 06:24 AM        INR                      1.63 (H)            06/23/2015 06:24 AM        APTT                     36.6 (H)            06/13/2015 05:05 PM     Intake & Output: In: 300 (I.V.:300)  Out: 425 (Urine:400)    Nausea & Vomiting:  No    Level of Consciousness:  Awake    Pain Assessment:  Adequate analgesia    Anesthesia Complications:  No apparent anesthetic complications    SUMMARY      Vital signs stable  OK to discharge from Stage I post anesthesia care.   Care transferred from Anesthesiology department on discharge from perioperative area

## 2019-05-31 NOTE — ANESTHESIA PRE PROCEDURE
Department of Anesthesiology  Preprocedure Note       Name:  Rock Patino   Age:  80 y.o.  :  1928                                          MRN:  [id number]         Date:  2019      Surgeon: Sweetie Moctezuma):  Jonny Orantes MD    Procedure: CYSTOSCOPY, RIGHT STENT IEXCHANGE, SUPRAPUBIC CATHETER CHANGE  CPT CODE - 89364 (Right Bladder)    Medications prior to admission:   Prior to Admission medications    Medication Sig Start Date End Date Taking? Authorizing Provider   nitrofurantoin (MACRODANTIN) 100 MG capsule Take 100 mg by mouth 2 times daily   Yes Historical Provider, MD   morphine sulfate 20 MG/ML concentrated oral solution Take 5 mg by mouth every 6 hours.    Yes Historical Provider, MD   acetaminophen (TYLENOL) 500 MG tablet Take 500 mg by mouth every 6 hours as needed for Pain   Yes Historical Provider, MD   Probiotic Product (PROBIOTIC-10) CAPS Take 2 capsules by mouth daily   Yes Historical Provider, MD   amLODIPine (NORVASC) 10 MG tablet Take 5 mg by mouth daily    Yes Historical Provider, MD   hydrALAZINE (APRESOLINE) 25 MG tablet Take 25 mg by mouth daily    Yes Historical Provider, MD   LORazepam (ATIVAN PO) Take 0.5 mg by mouth every 6 hours as needed    Yes Historical Provider, MD       Current medications:    Current Facility-Administered Medications   Medication Dose Route Frequency Provider Last Rate Last Dose    lactated ringers infusion   Intravenous Continuous Samantha Bartlett  mL/hr at 19 0824      sodium chloride flush 0.9 % injection 10 mL  10 mL Intravenous 2 times per day Samantha Bartlett MD        sodium chloride flush 0.9 % injection 10 mL  10 mL Intravenous PRN Samantha Bartlett MD        lidocaine PF 1 % injection 1 mL  1 mL Intradermal Once PRN Samantha Bartlett MD        cefTRIAXone (ROCEPHIN) 1 g IVPB in 50 mL D5W minibag  1 g Intravenous Q24H Jonny Orantes MD           Allergies:  No Known Allergies    Problem List:    Patient Active Problem List Diagnosis Code    Sepsis ([facility] [address] A41.9    Debility R53.81    Leukocytosis D72.829    Syncope and collapse B74    Complicated UTI (urinary tract infection) N39.0    E coli bacteremia R78.81    Infection due to ESBL-producing Escherichia coli A49.8, Z16.12    Septic shock ([facility]) A41.9, R65.21    Pyuria N39.0    GONSALO (acute kidney injury) ([facility] [address] N17.9    Lactic acidosis E87.2    Open-angle glaucoma H40.10X0    Altered mental status R41.82    Bacteremia due to Pseudomonas R78.81    Pulmonary infiltrates R91.8    Nephrolithiasis N20.0    Hydronephrosis with urinary obstruction due to renal calculus N13.2    Fecal impaction ([facility]) K56.41    Pancreatic cyst K86.2    Acute respiratory insufficiency, postoperative J95.89    Acute renal failure ([facility]) N17.9    Hypokalemia E87.6       Past Medical History:        Diagnosis Date    Altered mental status     Anarthria     Anemia     Anemia     Arthritis     Atrial fibrillation ([facility])     BPH (benign prostatic hyperplasia)     CHF (congestive heart failure) ([facility])     Cognitive communication deficit     Contracture, left elbow     left wrist    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     Cyst of pancreas     Dementia     Dysarthria     Dysphagia     GERD (gastroesophageal reflux disease)     Glaucoma     Hyperlipidemia     Hypertension     Impaired ambulation     2 person max assist or Joycelyn lift for transfer    MDRO (multiple drug resistant organisms) resistance 08/02/2018    + ecoli urine cx    Obstructive and reflux uropathy     Sepsis ([facility] [address]     Stricture of ureter     Suprapubic catheter ([facility] [address]     Urinary retention     UTI (urinary tract infection)        Past Surgical History:        Procedure Laterality Date    BRONCHOSCOPY N/A 8/6/2018    BRONCHOSCOPY performed by Sarai Davila MD at [address] Right 2/22/2019    CYSTOSCOPY, RIGHT STENT INSERTION AND SUPRAPUBIC CATHETER CHANGE  CPT CODE - 76812, [id number] performed by Lety Cisneros MD at [address] [facility] N/A 8/4/2018    BRONCHOSCOPY ALVEOLAR LAVAGE [facility] 8/3/2018    CYSTOSCOPY URETERAL STENT INSERTION performed by Lety Cisneros MD at [place] [address] History:    Social History     Tobacco Use    Smoking status: Never Smoker    Smokeless tobacco: Never Used   Substance Use Topics    Alcohol use: No     Alcohol/week: 0.0 oz                                Counseling given: Not Answered      Vital Signs (Current):   Vitals:    05/31/19 0805   BP: 124/65   Pulse: 90   Resp: 12   Temp: 98.2 °F (36.8 °C)   TempSrc: Temporal   SpO2: 95%   Weight: 142 lb 8 oz (64.6 kg)                                              BP Readings from Last 3 Encounters:   05/31/19 124/65   02/22/19 (!) 144/72   02/22/19 115/67       NPO Status: Time of last liquid consumption: 1830                        Time of last solid consumption: 1830                        Date of last liquid consumption: 05/30/19                        Date of last solid food consumption: 05/30/19    BMI:   Wt Readings from Last 3 Encounters:   05/31/19 142 lb 8 oz (64.6 kg)   08/14/18 184 lb 1.4 oz (83.5 kg)     Body mass index is 21.04 kg/m².     CBC:   Lab Results   Component Value Date    WBC 10.5 08/14/2018    RBC 3.42 08/14/2018    HGB 10.3 08/14/2018    HCT 30.7 08/14/2018    MCV 89.8 08/14/2018    RDW 14.6 08/14/2018     08/14/2018       CMP:   Lab Results   Component Value Date     08/11/2018    K 3.6 08/11/2018    K 3.5 08/03/2018     08/11/2018    CO2 23 08/11/2018    BUN 7 08/11/2018    CREATININE 1.0 08/11/2018    GFRAA >60 08/11/2018    AGRATIO 0.6 08/07/2018    LABGLOM >60 08/11/2018    GLUCOSE 84 08/11/2018    PROT 5.7 08/09/2018    CALCIUM 7.3 08/11/2018    BILITOT 2.2 08/09/2018    ALKPHOS 309 08/09/2018     08/09/2018     08/09/2018

## 2019-05-31 NOTE — OP NOTE
[facility]                                OPERATIVE REPORT    PATIENT NAME: Liliana Barrios                    :        1928  MED REC NO:   [id number]                          ROOM:  ACCOUNT NO:   [de-identified]                           ADMIT DATE: 2019  PROVIDER:     Kenzie Longoria MD    DATE OF PROCEDURE:  2019    At [facility] 12:  History of hydronephrosis and retained stents  in the right and bladder stones. POSTOPERATIVE DIAGNOSES:  History of hydronephrosis and retained stents  in the right and bladder stones with a suprapubic tube. PROCEDURE PERFORMED:  Cystoscopy, bladder stone lasering, right  ureteroscopy, laser lithotripsy, stent exchange, and SP tube change. INDICATIONS FOR THE PROCEDURE:  The patient is a 80-year-old male, who  has had hydronephrosis and sepsis in the past.  He now has two stents on  the right due to one being very encrusted. He now presents for a SP  tube change and right stent exchange and indicated procedure. The risks  and benefits were discussed with the patient and he agreed to proceed. DESCRIPTION OF THE PROCEDURE:  The patient had preoperative antibiotics  and general anesthesia. His SP tube was removed and his genitalia and  abdomen were prepped and draped in the usual sterile fashion. I placed  a new 18-Dutch SP tube, looked in with the cystoscope and grasped the  newer stent. This was unable to come out and I took a ureteroscope all  the way up to the ureter to the UPJ and laser as much of the stone  material off of it exited and now I was able to get the stent out. I  lasered more on the older stent, but I was unable to get this out. I  put a wire up and put a 6-Dutch x 24-cm double-J ureteral stent over  the wire. I removed the string and the wire.   I did laser some of the  stones in the bladder and he was awoken and sent to the recovery room in  good condition. He tolerated the procedure well. PLAN:  In three months, he needs another cystoscopy, right stent  exchange, and SP tube change.         Moo Joyner MD    D: 05/31/2019 12:25:26       T: 05/31/2019 15:44:59     [email or web address]  Job#: [id number]     Doc#: [id number]    CC:

## 2020-11-03 PROBLEM — N17.9 AKI (ACUTE KIDNEY INJURY) (HCC): Status: RESOLVED | Noted: 2018-08-02 | Resolved: 2020-11-03

## 2021-01-20 NOTE — PROGRESS NOTES
Urology Attending Progress Note      Subjective: on vent    Vitals:  /75   Pulse 62   Temp 97.7 °F (36.5 °C) (Axillary)   Resp 14   Ht 5' 9\" (1.753 m)   Wt 177 lb 14.6 oz (80.7 kg)   SpO2 98%   BMI 26.27 kg/m²   Temp  Av.5 °F (36.4 °C)  Min: 97.4 °F (36.3 °C)  Max: 97.7 °F (36.5 °C)    Intake/Output Summary (Last 24 hours) at 18 1407  Last data filed at 18 0851   Gross per 24 hour   Intake             6884 ml   Output             1425 ml   Net             5459 ml       Exam: stable    Labs:  WBC:    Lab Results   Component Value Date    WBC 27.2 2018     Hemoglobin/Hematocrit:  Lab Results   Component Value Date    HGB 10.3 2018    HCT 29.9 2018     BMP:  Lab Results   Component Value Date     2018    K 3.5 2018    K 3.5 2018    CL 94 2018    CO2 26 2018    BUN 19 2018    LABALBU 1.9 2018    CREATININE 1.5 2018    CALCIUM 6.7 2018    GFRAA 53 2018    LABGLOM 44 2018     PT/INR:    Lab Results   Component Value Date    PROTIME 18.1 2015    INR 1.63 2015     PTT:    Lab Results   Component Value Date    APTT 36.6 2015   [APTT    Urinalysis:     Urine Culture:      Blood Culture:      Antibiotic Therapy:      Imaging:       Impression/Plan: s/p stent insertion remains in critical condition although there is some slow improvement. We have no further plans at this time. If he improves will eventually need treatment of his stone. Will see when he is out of icu.  Please call any other issues    [facility] pair

## (undated) DEVICE — SINGLE USE BIOPSY VALVE MAJ-210: Brand: SINGLE USE BIOPSY VALVE (STERILE)

## (undated) DEVICE — SHEET,DRAPE,40X58,STERILE: Brand: MEDLINE

## (undated) DEVICE — GOWN SIRUS NONREIN XL W/TWL: Brand: MEDLINE INDUSTRIES, INC.

## (undated) DEVICE — Z DUP USE 2139333 GUIDEWIRE UROLOGICAL STR STD 0.035 IN X150 CM REG ZIPWIRE LF

## (undated) DEVICE — TRAY PREP DRY W/ PREM GLV 2 APPL 6 SPNG 2 UNDPD 1 OVERWRAP

## (undated) DEVICE — BAG DRNGE 2000ML ROUNDED TEARDROP W/ ANTIREFLX CHMBR DISP

## (undated) DEVICE — CATHETER URETH 20FR 5CC BLLN SIL ELASTMR F 2 W

## (undated) DEVICE — BLADE SURG NO15 C STL DISPOSABLE ST

## (undated) DEVICE — STERILE LATEX POWDER-FREE SURGICAL GLOVESWITH NITRILE AND EMOLLIENT COATINGS: Brand: PROTEXIS

## (undated) DEVICE — SYRINGE MED 10ML SLIP TIP BLNT FILL AND LUERLOCK DISP

## (undated) DEVICE — GLOVE SURG SZ 85 THK118MIL BLK LTX FREE POLYISOPRENE BEAD

## (undated) DEVICE — ENDO CARRY-ON PROCEDURE KIT INCLUDES SUCTION TUBING, LUBRICANT, GAUZE, BIOHAZARD STICKER, TRANSPORT PAD AND INTERCEPT BEDSIDE KIT.: Brand: ENDO CARRY-ON PROCEDURE KIT

## (undated) DEVICE — SYRINGE, LUER LOCK, 10ML: Brand: MEDLINE

## (undated) DEVICE — SYRINGE MED 50ML LUERSLIP TIP

## (undated) DEVICE — SOLUTION IRRIG 2000ML STRL H2O UROMATIC PLAS CONT USP

## (undated) DEVICE — GLOVE,SURG,SENSICARE SLT,LF,PF,8: Brand: MEDLINE

## (undated) DEVICE — GUIDEWIRE URO L150CM DIA0.038IN STD NIT HYDRPHLC STR TIP

## (undated) DEVICE — CONMED SCOPE SAVER BITE BLOCK, 20X27 MM: Brand: SCOPE SAVER

## (undated) DEVICE — NEBULIZER AEROSOL TBNG 7 FT FOR ACUTE CARE NEBUTECH

## (undated) DEVICE — SINGLE USE SUCTION VALVE MAJ-209: Brand: SINGLE USE SUCTION VALVE (STERILE)

## (undated) DEVICE — ADAPTER TBNG DIA15MM SWVL FBROPT BRONCHSCP TERM 2 AXIS PEEP

## (undated) DEVICE — X-RAY DETECTABLE SPONGES,16 PLY: Brand: VISTEC

## (undated) DEVICE — CYSTO: Brand: MEDLINE INDUSTRIES, INC.

## (undated) DEVICE — CATHETER URETH 18FR 30CC BLLN SIL ELASTMR F 2 W

## (undated) DEVICE — AIRLIFE™ ADULT AEROSOL MASK VINYL, UNDER-THE-CHIN STYLE: Brand: AIRLIFE™

## (undated) DEVICE — Z DISCONTINUED USE 2275683 GLOVE SURG SZ 6.5 L12IN FNGR THK13MIL WHT ISOLEX

## (undated) DEVICE — Z DISCONTINUED GLOVE SURG SZ 7 L12IN FNGR THK13MIL WHT ISOLEX POLYISOPRENE

## (undated) DEVICE — GLOVE ORANGE PI 7 1/2   MSG9075

## (undated) DEVICE — SINGLE ACTION PUMPING SYSTEM

## (undated) DEVICE — OPEN-END FLEXI-TIP URETERAL CATHETER: Brand: FLEXI-TIP

## (undated) DEVICE — SYRINGE MED 10ML LUERLOCK TIP W/O SFTY DISP

## (undated) DEVICE — GLOVE SURG SZ 75 THK118MIL BLK LTX FREE POLYISOPRENE BEAD

## (undated) DEVICE — BAG DRNGE COMB PK